# Patient Record
Sex: FEMALE | Race: WHITE | Employment: UNEMPLOYED | URBAN - METROPOLITAN AREA
[De-identification: names, ages, dates, MRNs, and addresses within clinical notes are randomized per-mention and may not be internally consistent; named-entity substitution may affect disease eponyms.]

---

## 2018-01-01 ENCOUNTER — HOSPITAL ENCOUNTER (OUTPATIENT)
Dept: MAMMOGRAPHY | Age: 58
Discharge: HOME | End: 2018-09-26
Attending: INTERNAL MEDICINE
Payer: MEDICARE

## 2018-01-01 DIAGNOSIS — Z12.31 VISIT FOR SCREENING MAMMOGRAM: ICD-10-CM

## 2018-01-01 PROCEDURE — 77067 SCR MAMMO BI INCL CAD: CPT

## 2018-09-17 PROBLEM — F32.A MILD DEPRESSION: Status: ACTIVE | Noted: 2018-01-01

## 2019-01-01 ENCOUNTER — APPOINTMENT (OUTPATIENT)
Dept: CT IMAGING | Age: 59
DRG: 020 | End: 2019-01-01
Attending: NURSE PRACTITIONER
Payer: MEDICARE

## 2019-01-01 ENCOUNTER — APPOINTMENT (OUTPATIENT)
Dept: GENERAL RADIOLOGY | Age: 59
DRG: 020 | End: 2019-01-01
Attending: NURSE PRACTITIONER
Payer: MEDICARE

## 2019-01-01 ENCOUNTER — APPOINTMENT (OUTPATIENT)
Dept: GENERAL RADIOLOGY | Age: 59
DRG: 020 | End: 2019-01-01
Attending: EMERGENCY MEDICINE
Payer: MEDICARE

## 2019-01-01 ENCOUNTER — APPOINTMENT (OUTPATIENT)
Dept: MRI IMAGING | Age: 59
DRG: 020 | End: 2019-01-01
Attending: NURSE PRACTITIONER
Payer: MEDICARE

## 2019-01-01 ENCOUNTER — APPOINTMENT (OUTPATIENT)
Dept: GENERAL RADIOLOGY | Age: 59
DRG: 020 | End: 2019-01-01
Attending: NEUROLOGICAL SURGERY
Payer: MEDICARE

## 2019-01-01 ENCOUNTER — APPOINTMENT (OUTPATIENT)
Dept: INTERVENTIONAL RADIOLOGY/VASCULAR | Age: 59
DRG: 020 | End: 2019-01-01
Attending: NURSE PRACTITIONER
Payer: MEDICARE

## 2019-01-01 ENCOUNTER — APPOINTMENT (OUTPATIENT)
Dept: CT IMAGING | Age: 59
DRG: 020 | End: 2019-01-01
Attending: NEUROLOGICAL SURGERY
Payer: MEDICARE

## 2019-01-01 ENCOUNTER — HOSPITAL ENCOUNTER (EMERGENCY)
Age: 59
Discharge: OTHER INSTITUTION | DRG: 020 | End: 2019-02-20
Attending: EMERGENCY MEDICINE
Payer: MEDICARE

## 2019-01-01 ENCOUNTER — HOSPITAL ENCOUNTER (INPATIENT)
Age: 59
LOS: 11 days | DRG: 020 | End: 2019-03-03
Attending: NEUROLOGICAL SURGERY | Admitting: NEUROLOGICAL SURGERY
Payer: MEDICARE

## 2019-01-01 ENCOUNTER — APPOINTMENT (OUTPATIENT)
Dept: CT IMAGING | Age: 59
DRG: 020 | End: 2019-01-01
Attending: EMERGENCY MEDICINE
Payer: MEDICARE

## 2019-01-01 VITALS
TEMPERATURE: 99.7 F | OXYGEN SATURATION: 100 % | WEIGHT: 117.5 LBS | DIASTOLIC BLOOD PRESSURE: 77 MMHG | BODY MASS INDEX: 18.44 KG/M2 | HEIGHT: 67 IN | HEART RATE: 90 BPM | SYSTOLIC BLOOD PRESSURE: 116 MMHG | RESPIRATION RATE: 12 BRPM

## 2019-01-01 VITALS
TEMPERATURE: 99.2 F | DIASTOLIC BLOOD PRESSURE: 66 MMHG | SYSTOLIC BLOOD PRESSURE: 178 MMHG | BODY MASS INDEX: 27.1 KG/M2 | WEIGHT: 173 LBS | OXYGEN SATURATION: 95 % | HEART RATE: 75 BPM | RESPIRATION RATE: 36 BRPM

## 2019-01-01 DIAGNOSIS — T79.6XXA TRAUMATIC RHABDOMYOLYSIS, INITIAL ENCOUNTER (HCC): ICD-10-CM

## 2019-01-01 DIAGNOSIS — B20 HIV (HUMAN IMMUNODEFICIENCY VIRUS INFECTION) (HCC): ICD-10-CM

## 2019-01-01 DIAGNOSIS — N30.00 ACUTE CYSTITIS WITHOUT HEMATURIA: ICD-10-CM

## 2019-01-01 DIAGNOSIS — I10 HYPERTENSION, UNSPECIFIED TYPE: ICD-10-CM

## 2019-01-01 DIAGNOSIS — R90.89 MIDLINE SHIFT OF BRAIN: ICD-10-CM

## 2019-01-01 DIAGNOSIS — R56.1 SEIZURE AFTER HEAD INJURY (HCC): ICD-10-CM

## 2019-01-01 DIAGNOSIS — I10 ESSENTIAL HYPERTENSION: ICD-10-CM

## 2019-01-01 DIAGNOSIS — I60.11: Chronic | ICD-10-CM

## 2019-01-01 DIAGNOSIS — I60.9 SUBARACHNOID HEMORRHAGE (HCC): Primary | ICD-10-CM

## 2019-01-01 DIAGNOSIS — Z51.5 END OF LIFE CARE: ICD-10-CM

## 2019-01-01 DIAGNOSIS — I61.0 NONTRAUMATIC SUBCORTICAL HEMORRHAGE OF RIGHT CEREBRAL HEMISPHERE (HCC): ICD-10-CM

## 2019-01-01 DIAGNOSIS — G91.0 COMMUNICATING HYDROCEPHALUS (HCC): ICD-10-CM

## 2019-01-01 DIAGNOSIS — G93.6 CEREBRAL EDEMA (HCC): ICD-10-CM

## 2019-01-01 DIAGNOSIS — J18.9 PNEUMONIA OF LEFT UPPER LOBE DUE TO INFECTIOUS ORGANISM: ICD-10-CM

## 2019-01-01 DIAGNOSIS — R40.2432 GLASGOW COMA SCALE SCORE 3-8, AT ARRIVAL TO EMERGENCY DEPARTMENT (HCC): ICD-10-CM

## 2019-01-01 DIAGNOSIS — I60.9 SAH (SUBARACHNOID HEMORRHAGE) (HCC): Primary | Chronic | ICD-10-CM

## 2019-01-01 DIAGNOSIS — R77.8 ELEVATED TROPONIN: ICD-10-CM

## 2019-01-01 DIAGNOSIS — J18.9 COMMUNITY ACQUIRED PNEUMONIA, UNSPECIFIED LATERALITY: ICD-10-CM

## 2019-01-01 DIAGNOSIS — R56.9 SEIZURE (HCC): ICD-10-CM

## 2019-01-01 DIAGNOSIS — I61.9 INTRAPARENCHYMAL HEMORRHAGE OF BRAIN (HCC): ICD-10-CM

## 2019-01-01 LAB
ABO + RH BLD: NORMAL
ALBUMIN SERPL-MCNC: 2.7 G/DL (ref 3.5–5)
ALBUMIN SERPL-MCNC: 2.7 G/DL (ref 3.5–5)
ALBUMIN SERPL-MCNC: 3.6 G/DL (ref 3.5–5)
ALBUMIN/GLOB SERPL: 0.9 {RATIO}
ALBUMIN/GLOB SERPL: 0.9 {RATIO} (ref 1.2–3.5)
ALBUMIN/GLOB SERPL: 1 {RATIO} (ref 1.2–3.5)
ALP SERPL-CCNC: 109 U/L (ref 50–130)
ALP SERPL-CCNC: 79 U/L (ref 50–136)
ALP SERPL-CCNC: 92 U/L (ref 50–136)
ALT SERPL-CCNC: 54 U/L (ref 12–65)
ALT SERPL-CCNC: 56 U/L (ref 12–65)
ALT SERPL-CCNC: 77 U/L (ref 12–65)
AMPHET UR QL SCN: NEGATIVE
ANION GAP SERPL CALC-SCNC: 11 MMOL/L (ref 7–16)
ANION GAP SERPL CALC-SCNC: 11 MMOL/L (ref 7–16)
ANION GAP SERPL CALC-SCNC: 12 MMOL/L (ref 7–16)
ANION GAP SERPL CALC-SCNC: 13 MMOL/L
ANION GAP SERPL CALC-SCNC: 14 MMOL/L (ref 7–16)
ANION GAP SERPL CALC-SCNC: 7 MMOL/L (ref 7–16)
ANION GAP SERPL CALC-SCNC: 8 MMOL/L (ref 7–16)
ANION GAP SERPL CALC-SCNC: 9 MMOL/L (ref 7–16)
ANION GAP SERPL CALC-SCNC: 9 MMOL/L (ref 7–16)
APPEARANCE UR: ABNORMAL
ARTERIAL PATENCY WRIST A: ABNORMAL
ARTERIAL PATENCY WRIST A: YES
ASPIRIN TEST, ASPIRN: 350 ARU (ref 620–672)
AST SERPL-CCNC: 105 U/L (ref 15–37)
AST SERPL-CCNC: 131 U/L (ref 15–37)
AST SERPL-CCNC: 67 U/L (ref 15–37)
ATRIAL RATE: 159 BPM
ATRIAL RATE: 77 BPM
BACTERIA SPEC CULT: ABNORMAL
BACTERIA SPEC CULT: NORMAL
BACTERIA URNS QL MICRO: ABNORMAL /HPF
BARBITURATES UR QL SCN: NEGATIVE
BASE DEFICIT BLD-SCNC: 11 MMOL/L
BASE DEFICIT BLD-SCNC: 13 MMOL/L
BASE DEFICIT BLD-SCNC: 3 MMOL/L
BASE DEFICIT BLD-SCNC: 3 MMOL/L
BASE DEFICIT BLD-SCNC: 5 MMOL/L
BASE DEFICIT BLD-SCNC: 6 MMOL/L
BASE DEFICIT BLD-SCNC: 7 MMOL/L
BASE DEFICIT BLD-SCNC: 7 MMOL/L
BASE DEFICIT BLD-SCNC: 9 MMOL/L
BASE DEFICIT BLD-SCNC: 9 MMOL/L
BASOPHILS # BLD AUTO: 0 X10E3/UL (ref 0–0.2)
BASOPHILS # BLD: 0 K/UL (ref 0–0.2)
BASOPHILS NFR BLD AUTO: 0 %
BASOPHILS NFR BLD: 0 % (ref 0–2)
BDY SITE: ABNORMAL
BENZODIAZ UR QL: POSITIVE
BILIRUB DIRECT SERPL-MCNC: 0.2 MG/DL
BILIRUB DIRECT SERPL-MCNC: <0.1 MG/DL
BILIRUB SERPL-MCNC: 0.3 MG/DL (ref 0.2–1.1)
BILIRUB SERPL-MCNC: 0.4 MG/DL (ref 0.2–1.1)
BILIRUB SERPL-MCNC: 0.5 MG/DL (ref 0.2–1.1)
BILIRUB UR QL: NEGATIVE
BLD PROD TYP BPU: NORMAL
BLOOD GROUP ANTIBODIES SERPL: NORMAL
BODY TEMPERATURE: 98.6
BPU ID: NORMAL
BUN SERPL-MCNC: 36 MG/DL (ref 6–23)
BUN SERPL-MCNC: 36 MG/DL (ref 6–23)
BUN SERPL-MCNC: 38 MG/DL (ref 6–23)
BUN SERPL-MCNC: 38 MG/DL (ref 6–23)
BUN SERPL-MCNC: 41 MG/DL (ref 6–23)
BUN SERPL-MCNC: 42 MG/DL (ref 6–23)
BUN SERPL-MCNC: 45 MG/DL (ref 6–23)
BUN SERPL-MCNC: 47 MG/DL (ref 6–23)
BUN SERPL-MCNC: 50 MG/DL (ref 6–23)
BUN SERPL-MCNC: 53 MG/DL (ref 6–23)
BUN SERPL-MCNC: 55 MG/DL (ref 6–23)
CALCIUM SERPL-MCNC: 6.8 MG/DL (ref 8.3–10.4)
CALCIUM SERPL-MCNC: 6.9 MG/DL (ref 8.3–10.4)
CALCIUM SERPL-MCNC: 7 MG/DL (ref 8.3–10.4)
CALCIUM SERPL-MCNC: 7.4 MG/DL (ref 8.3–10.4)
CALCIUM SERPL-MCNC: 7.6 MG/DL (ref 8.3–10.4)
CALCIUM SERPL-MCNC: 7.7 MG/DL (ref 8.3–10.4)
CALCIUM SERPL-MCNC: 7.9 MG/DL (ref 8.3–10.4)
CALCIUM SERPL-MCNC: 7.9 MG/DL (ref 8.3–10.4)
CALCIUM SERPL-MCNC: 8.1 MG/DL (ref 8.3–10.4)
CALCIUM SERPL-MCNC: 8.1 MG/DL (ref 8.3–10.4)
CALCIUM SERPL-MCNC: 9.2 MG/DL (ref 8.3–10.4)
CALCULATED P AXIS, ECG09: 64 DEGREES
CALCULATED R AXIS, ECG10: 82 DEGREES
CALCULATED R AXIS, ECG10: 87 DEGREES
CALCULATED T AXIS, ECG11: -104 DEGREES
CALCULATED T AXIS, ECG11: -129 DEGREES
CANNABINOIDS UR QL SCN: NEGATIVE
CD3 CELLS # BLD: 892 /UL (ref 622–2402)
CD3 CELLS NFR BLD: 81.1 % (ref 57.5–86.2)
CD3+CD4+ CELLS # BLD: 350 /UL (ref 359–1519)
CD3+CD4+ CELLS # BLD: 407 /UL (ref 359–1519)
CD3+CD4+ CELLS NFR BLD: 31.8 % (ref 30.8–58.5)
CD3+CD4+ CELLS NFR BLD: 33.9 % (ref 30.8–58.5)
CD3+CD4+ CELLS/CD3+CD8+ CLL BLD: 0.64 % (ref 0.92–3.72)
CD3+CD8+ CELLS # BLD: 546 /UL (ref 109–897)
CD3+CD8+ CELLS NFR BLD: 49.6 % (ref 12–35.5)
CHLORIDE SERPL-SCNC: 109 MMOL/L (ref 98–107)
CHLORIDE SERPL-SCNC: 118 MMOL/L (ref 98–107)
CHLORIDE SERPL-SCNC: 120 MMOL/L (ref 98–107)
CHLORIDE SERPL-SCNC: 123 MMOL/L (ref 98–107)
CHLORIDE SERPL-SCNC: 125 MMOL/L (ref 98–107)
CHLORIDE SERPL-SCNC: 127 MMOL/L (ref 98–107)
CHLORIDE SERPL-SCNC: 129 MMOL/L (ref 98–107)
CHOLEST SERPL-MCNC: 83 MG/DL
CK SERPL-CCNC: 1753 U/L (ref 21–215)
CK SERPL-CCNC: 2191 U/L (ref 21–215)
CK SERPL-CCNC: 881 U/L (ref 21–215)
CO2 BLD-SCNC: 13 MMOL/L
CO2 BLD-SCNC: 16 MMOL/L
CO2 BLD-SCNC: 17 MMOL/L
CO2 BLD-SCNC: 17 MMOL/L
CO2 BLD-SCNC: 18 MMOL/L
CO2 BLD-SCNC: 18 MMOL/L
CO2 BLD-SCNC: 19 MMOL/L
CO2 BLD-SCNC: 19 MMOL/L
CO2 BLD-SCNC: 20 MMOL/L
CO2 BLD-SCNC: 20 MMOL/L
CO2 BLD-SCNC: 21 MMOL/L
CO2 BLD-SCNC: 21 MMOL/L
CO2 BLD-SCNC: 22 MMOL/L
CO2 SERPL-SCNC: 14 MMOL/L (ref 21–32)
CO2 SERPL-SCNC: 17 MMOL/L (ref 21–32)
CO2 SERPL-SCNC: 18 MMOL/L (ref 21–32)
CO2 SERPL-SCNC: 18 MMOL/L (ref 21–32)
CO2 SERPL-SCNC: 19 MMOL/L (ref 21–32)
CO2 SERPL-SCNC: 20 MMOL/L (ref 21–32)
CO2 SERPL-SCNC: 21 MMOL/L (ref 21–32)
CO2 SERPL-SCNC: 22 MMOL/L (ref 21–32)
COCAINE UR QL SCN: NEGATIVE
COLLECT TIME,HTIME: 1416
COLLECT TIME,HTIME: 1533
COLLECT TIME,HTIME: 2245
COLLECT TIME,HTIME: 244
COLLECT TIME,HTIME: 310
COLLECT TIME,HTIME: 311
COLLECT TIME,HTIME: 315
COLLECT TIME,HTIME: 323
COLLECT TIME,HTIME: 325
COLLECT TIME,HTIME: 327
COLLECT TIME,HTIME: 327
COLLECT TIME,HTIME: 335
COLLECT TIME,HTIME: 344
COLOR UR: YELLOW
CREAT SERPL-MCNC: 1.94 MG/DL (ref 0.6–1)
CREAT SERPL-MCNC: 2.12 MG/DL (ref 0.6–1)
CREAT SERPL-MCNC: 2.13 MG/DL (ref 0.6–1)
CREAT SERPL-MCNC: 2.2 MG/DL (ref 0.6–1)
CREAT SERPL-MCNC: 2.2 MG/DL (ref 0.6–1)
CREAT SERPL-MCNC: 2.21 MG/DL (ref 0.6–1)
CREAT SERPL-MCNC: 2.25 MG/DL (ref 0.6–1)
CREAT SERPL-MCNC: 2.26 MG/DL (ref 0.6–1)
CREAT SERPL-MCNC: 2.29 MG/DL (ref 0.6–1)
CREAT SERPL-MCNC: 2.32 MG/DL (ref 0.6–1)
CREAT SERPL-MCNC: 3.2 MG/DL (ref 0.6–1)
CROSSMATCH RESULT,%XM: NORMAL
DIAGNOSIS, 93000: NORMAL
DIAGNOSIS, 93000: NORMAL
DIFFERENTIAL METHOD BLD: ABNORMAL
EOSINOPHIL # BLD AUTO: 0 X10E3/UL (ref 0–0.4)
EOSINOPHIL # BLD: 0 K/UL (ref 0–0.8)
EOSINOPHIL NFR BLD AUTO: 0 %
EOSINOPHIL NFR BLD: 0 % (ref 0.5–7.8)
EPI CELLS #/AREA URNS HPF: ABNORMAL /HPF
ERYTHROCYTE [DISTWIDTH] IN BLOOD BY AUTOMATED COUNT: 14 % (ref 11.9–14.6)
ERYTHROCYTE [DISTWIDTH] IN BLOOD BY AUTOMATED COUNT: 14.3 % (ref 11.9–14.6)
ERYTHROCYTE [DISTWIDTH] IN BLOOD BY AUTOMATED COUNT: 14.6 % (ref 11.9–14.6)
ERYTHROCYTE [DISTWIDTH] IN BLOOD BY AUTOMATED COUNT: 15.3 % (ref 11.9–14.6)
ERYTHROCYTE [DISTWIDTH] IN BLOOD BY AUTOMATED COUNT: 15.4 % (ref 11.9–14.6)
ERYTHROCYTE [DISTWIDTH] IN BLOOD BY AUTOMATED COUNT: 15.7 % (ref 11.9–14.6)
ERYTHROCYTE [DISTWIDTH] IN BLOOD BY AUTOMATED COUNT: 15.7 % (ref 11.9–14.6)
ERYTHROCYTE [DISTWIDTH] IN BLOOD BY AUTOMATED COUNT: 15.8 % (ref 12.3–15.4)
ERYTHROCYTE [DISTWIDTH] IN BLOOD BY AUTOMATED COUNT: 15.9 % (ref 11.9–14.6)
ERYTHROCYTE [DISTWIDTH] IN BLOOD BY AUTOMATED COUNT: 15.9 % (ref 11.9–14.6)
ERYTHROCYTE [DISTWIDTH] IN BLOOD BY AUTOMATED COUNT: 16 % (ref 11.9–14.6)
ERYTHROCYTE [DISTWIDTH] IN BLOOD BY AUTOMATED COUNT: 16.3 % (ref 11.9–14.6)
EST. AVERAGE GLUCOSE BLD GHB EST-MCNC: ABNORMAL MG/DL
EXHALED MINUTE VOLUME, VE: 10.2 L/MIN
EXHALED MINUTE VOLUME, VE: 10.5 L/MIN
EXHALED MINUTE VOLUME, VE: 10.6 L/MIN
EXHALED MINUTE VOLUME, VE: 10.8 L/MIN
EXHALED MINUTE VOLUME, VE: 11.3 L/MIN
EXHALED MINUTE VOLUME, VE: 11.4 L/MIN
EXHALED MINUTE VOLUME, VE: 12 L/MIN
EXHALED MINUTE VOLUME, VE: 13.1 L/MIN
EXHALED MINUTE VOLUME, VE: 13.8 L/MIN
EXHALED MINUTE VOLUME, VE: 7.2 L/MIN
EXHALED MINUTE VOLUME, VE: 7.2 L/MIN
EXHALED MINUTE VOLUME, VE: 8.1 L/MIN
EXHALED MINUTE VOLUME, VE: 8.3 L/MIN
GAS FLOW.O2 O2 DELIVERY SYS: ABNORMAL L/MIN
GAS FLOW.O2 SETTING OXYMISER: 12 BPM
GAS FLOW.O2 SETTING OXYMISER: 15 BPM
GLOBULIN SER CALC-MCNC: 2.6 G/DL (ref 2.3–3.5)
GLOBULIN SER CALC-MCNC: 3 G/DL (ref 2.3–3.5)
GLOBULIN SER CALC-MCNC: 3.9 G/DL (ref 2.3–3.5)
GLUCOSE BLD STRIP.AUTO-MCNC: 107 MG/DL (ref 65–100)
GLUCOSE BLD STRIP.AUTO-MCNC: 108 MG/DL (ref 65–100)
GLUCOSE BLD STRIP.AUTO-MCNC: 118 MG/DL (ref 65–100)
GLUCOSE BLD STRIP.AUTO-MCNC: 125 MG/DL (ref 65–100)
GLUCOSE BLD STRIP.AUTO-MCNC: 128 MG/DL (ref 65–100)
GLUCOSE BLD STRIP.AUTO-MCNC: 130 MG/DL (ref 65–100)
GLUCOSE BLD STRIP.AUTO-MCNC: 131 MG/DL (ref 65–100)
GLUCOSE BLD STRIP.AUTO-MCNC: 141 MG/DL (ref 65–100)
GLUCOSE BLD STRIP.AUTO-MCNC: 153 MG/DL (ref 65–100)
GLUCOSE BLD STRIP.AUTO-MCNC: 156 MG/DL (ref 65–100)
GLUCOSE BLD STRIP.AUTO-MCNC: 158 MG/DL (ref 65–100)
GLUCOSE BLD STRIP.AUTO-MCNC: 159 MG/DL (ref 65–100)
GLUCOSE BLD STRIP.AUTO-MCNC: 160 MG/DL (ref 65–100)
GLUCOSE BLD STRIP.AUTO-MCNC: 161 MG/DL (ref 65–100)
GLUCOSE BLD STRIP.AUTO-MCNC: 162 MG/DL (ref 65–100)
GLUCOSE BLD STRIP.AUTO-MCNC: 162 MG/DL (ref 65–100)
GLUCOSE BLD STRIP.AUTO-MCNC: 164 MG/DL (ref 65–100)
GLUCOSE BLD STRIP.AUTO-MCNC: 164 MG/DL (ref 65–100)
GLUCOSE BLD STRIP.AUTO-MCNC: 165 MG/DL (ref 65–100)
GLUCOSE BLD STRIP.AUTO-MCNC: 167 MG/DL (ref 65–100)
GLUCOSE BLD STRIP.AUTO-MCNC: 168 MG/DL (ref 65–100)
GLUCOSE BLD STRIP.AUTO-MCNC: 169 MG/DL (ref 65–100)
GLUCOSE BLD STRIP.AUTO-MCNC: 173 MG/DL (ref 65–100)
GLUCOSE BLD STRIP.AUTO-MCNC: 178 MG/DL (ref 65–100)
GLUCOSE BLD STRIP.AUTO-MCNC: 180 MG/DL (ref 65–100)
GLUCOSE BLD STRIP.AUTO-MCNC: 181 MG/DL (ref 65–100)
GLUCOSE BLD STRIP.AUTO-MCNC: 182 MG/DL (ref 65–100)
GLUCOSE BLD STRIP.AUTO-MCNC: 182 MG/DL (ref 65–100)
GLUCOSE BLD STRIP.AUTO-MCNC: 183 MG/DL (ref 65–100)
GLUCOSE BLD STRIP.AUTO-MCNC: 186 MG/DL (ref 65–100)
GLUCOSE BLD STRIP.AUTO-MCNC: 187 MG/DL (ref 65–100)
GLUCOSE BLD STRIP.AUTO-MCNC: 188 MG/DL (ref 65–100)
GLUCOSE BLD STRIP.AUTO-MCNC: 190 MG/DL (ref 65–100)
GLUCOSE BLD STRIP.AUTO-MCNC: 193 MG/DL (ref 65–100)
GLUCOSE BLD STRIP.AUTO-MCNC: 196 MG/DL (ref 65–100)
GLUCOSE BLD STRIP.AUTO-MCNC: 201 MG/DL (ref 65–100)
GLUCOSE BLD STRIP.AUTO-MCNC: 205 MG/DL (ref 65–100)
GLUCOSE BLD STRIP.AUTO-MCNC: 227 MG/DL (ref 65–100)
GLUCOSE SERPL-MCNC: 123 MG/DL (ref 65–100)
GLUCOSE SERPL-MCNC: 128 MG/DL (ref 65–100)
GLUCOSE SERPL-MCNC: 148 MG/DL (ref 65–100)
GLUCOSE SERPL-MCNC: 158 MG/DL (ref 65–100)
GLUCOSE SERPL-MCNC: 168 MG/DL (ref 65–100)
GLUCOSE SERPL-MCNC: 170 MG/DL (ref 65–100)
GLUCOSE SERPL-MCNC: 170 MG/DL (ref 65–100)
GLUCOSE SERPL-MCNC: 178 MG/DL (ref 65–100)
GLUCOSE SERPL-MCNC: 183 MG/DL (ref 65–100)
GLUCOSE SERPL-MCNC: 192 MG/DL (ref 65–100)
GLUCOSE SERPL-MCNC: 227 MG/DL (ref 65–100)
GLUCOSE UR STRIP.AUTO-MCNC: NEGATIVE MG/DL
GRAM STN SPEC: NORMAL
HBA1C MFR BLD: <3.5 % (ref 4.8–6)
HCO3 BLD-SCNC: 12.6 MMOL/L (ref 22–26)
HCO3 BLD-SCNC: 15.4 MMOL/L (ref 22–26)
HCO3 BLD-SCNC: 15.6 MMOL/L (ref 22–26)
HCO3 BLD-SCNC: 16.2 MMOL/L (ref 22–26)
HCO3 BLD-SCNC: 16.7 MMOL/L (ref 22–26)
HCO3 BLD-SCNC: 17.5 MMOL/L (ref 22–26)
HCO3 BLD-SCNC: 17.8 MMOL/L (ref 22–26)
HCO3 BLD-SCNC: 18.1 MMOL/L (ref 22–26)
HCO3 BLD-SCNC: 18.5 MMOL/L (ref 22–26)
HCO3 BLD-SCNC: 19.2 MMOL/L (ref 22–26)
HCO3 BLD-SCNC: 19.5 MMOL/L (ref 22–26)
HCO3 BLD-SCNC: 19.7 MMOL/L (ref 22–26)
HCO3 BLD-SCNC: 20.8 MMOL/L (ref 22–26)
HCT VFR BLD AUTO: 23.4 % (ref 35.8–46.3)
HCT VFR BLD AUTO: 23.6 % (ref 35.8–46.3)
HCT VFR BLD AUTO: 23.7 % (ref 35.8–46.3)
HCT VFR BLD AUTO: 24.6 % (ref 34–46.6)
HCT VFR BLD AUTO: 26.7 % (ref 35.8–46.3)
HCT VFR BLD AUTO: 27.1 % (ref 35.8–46.3)
HCT VFR BLD AUTO: 27.4 % (ref 35.8–46.3)
HCT VFR BLD AUTO: 27.6 % (ref 35.8–46.3)
HCT VFR BLD AUTO: 27.7 % (ref 35.8–46.3)
HCT VFR BLD AUTO: 27.8 % (ref 35.8–46.3)
HCT VFR BLD AUTO: 28.8 % (ref 35.8–46.3)
HCT VFR BLD AUTO: 28.8 % (ref 35.8–46.3)
HCT VFR BLD AUTO: 29.4 % (ref 35.8–46.3)
HCT VFR BLD AUTO: 33.5 % (ref 35.8–46.3)
HCV AB S/CO SERPL IA: 6 S/CO RATIO (ref 0–0.9)
HCV RNA SERPL QL NAA+PROBE: NEGATIVE
HCV RNA SERPL QL NAA+PROBE: NORMAL
HDLC SERPL-MCNC: 25 MG/DL (ref 40–60)
HDLC SERPL: 3.3 {RATIO}
HEPARIN INDUCED PLT,XHIPA: NEGATIVE
HGB BLD-MCNC: 10.9 G/DL (ref 11.7–15.4)
HGB BLD-MCNC: 7.3 G/DL (ref 11.7–15.4)
HGB BLD-MCNC: 7.4 G/DL (ref 11.7–15.4)
HGB BLD-MCNC: 7.6 G/DL (ref 11.7–15.4)
HGB BLD-MCNC: 8.3 G/DL (ref 11.1–15.9)
HGB BLD-MCNC: 8.5 G/DL (ref 11.7–15.4)
HGB BLD-MCNC: 8.7 G/DL (ref 11.7–15.4)
HGB BLD-MCNC: 8.8 G/DL (ref 11.7–15.4)
HGB BLD-MCNC: 9 G/DL (ref 11.7–15.4)
HGB BLD-MCNC: 9.1 G/DL (ref 11.7–15.4)
HGB BLD-MCNC: 9.2 G/DL (ref 11.7–15.4)
HGB BLD-MCNC: 9.3 G/DL (ref 11.7–15.4)
HGB UR QL STRIP: ABNORMAL
HIT INTERPRETATION,XINTPR: NEGATIVE
HIT PROFILE,XHITT: NORMAL
IMM GRANULOCYTES # BLD AUTO: 0.1 K/UL (ref 0–0.5)
IMM GRANULOCYTES # BLD: 0.1 X10E3/UL (ref 0–0.1)
IMM GRANULOCYTES NFR BLD AUTO: 0 % (ref 0–5)
IMM GRANULOCYTES NFR BLD: 1 %
INR PPP: 1.1
INSPIRATION.DURATION SETTING TIME VENT: 0.9 SEC
KETONES UR QL STRIP.AUTO: NEGATIVE MG/DL
LACTATE BLD-SCNC: 2.63 MMOL/L (ref 0.5–1.9)
LACTATE SERPL-SCNC: 1.1 MMOL/L (ref 0.4–2)
LDLC SERPL CALC-MCNC: 37.2 MG/DL
LEUKOCYTE ESTERASE UR QL STRIP.AUTO: ABNORMAL
LIPID PROFILE,FLP: ABNORMAL
LYMPHOCYTES # BLD AUTO: 1.1 X10E3/UL (ref 0.7–3.1)
LYMPHOCYTES # BLD AUTO: 1.2 X10E3/UL (ref 0.7–3.1)
LYMPHOCYTES # BLD: 1.5 K/UL (ref 0.5–4.6)
LYMPHOCYTES NFR BLD AUTO: 11 %
LYMPHOCYTES NFR BLD AUTO: 11 %
LYMPHOCYTES NFR BLD: 9 % (ref 13–44)
MAGNESIUM SERPL-MCNC: 1.1 MG/DL (ref 1.8–2.4)
MAGNESIUM SERPL-MCNC: 2.2 MG/DL (ref 1.8–2.4)
MAGNESIUM SERPL-MCNC: 2.2 MG/DL (ref 1.8–2.4)
MAGNESIUM SERPL-MCNC: 2.3 MG/DL (ref 1.8–2.4)
MAGNESIUM SERPL-MCNC: 2.3 MG/DL (ref 1.8–2.4)
MAGNESIUM SERPL-MCNC: 2.4 MG/DL (ref 1.8–2.4)
MAGNESIUM SERPL-MCNC: 2.4 MG/DL (ref 1.8–2.4)
MAGNESIUM SERPL-MCNC: 2.5 MG/DL (ref 1.8–2.4)
MAGNESIUM SERPL-MCNC: 2.5 MG/DL (ref 1.8–2.4)
MAGNESIUM SERPL-MCNC: 2.7 MG/DL (ref 1.8–2.4)
MAGNESIUM SERPL-MCNC: 4 MG/DL (ref 1.8–2.4)
MCH RBC QN AUTO: 30.9 PG (ref 26.1–32.9)
MCH RBC QN AUTO: 31.1 PG (ref 26.1–32.9)
MCH RBC QN AUTO: 31.2 PG (ref 26.1–32.9)
MCH RBC QN AUTO: 31.3 PG (ref 26.1–32.9)
MCH RBC QN AUTO: 31.4 PG (ref 26.1–32.9)
MCH RBC QN AUTO: 31.5 PG (ref 26.1–32.9)
MCH RBC QN AUTO: 31.7 PG (ref 26.1–32.9)
MCH RBC QN AUTO: 31.7 PG (ref 26.1–32.9)
MCH RBC QN AUTO: 31.8 PG (ref 26.6–33)
MCH RBC QN AUTO: 32.3 PG (ref 26.1–32.9)
MCHC RBC AUTO-ENTMCNC: 30.9 G/DL (ref 31.4–35)
MCHC RBC AUTO-ENTMCNC: 31.3 G/DL (ref 31.4–35)
MCHC RBC AUTO-ENTMCNC: 31.6 G/DL (ref 31.4–35)
MCHC RBC AUTO-ENTMCNC: 31.9 G/DL (ref 31.4–35)
MCHC RBC AUTO-ENTMCNC: 32.1 G/DL (ref 31.4–35)
MCHC RBC AUTO-ENTMCNC: 32.3 G/DL (ref 31.4–35)
MCHC RBC AUTO-ENTMCNC: 32.5 G/DL (ref 31.4–35)
MCHC RBC AUTO-ENTMCNC: 32.5 G/DL (ref 31.4–35)
MCHC RBC AUTO-ENTMCNC: 32.7 G/DL (ref 31.4–35)
MCHC RBC AUTO-ENTMCNC: 32.8 G/DL (ref 31.4–35)
MCHC RBC AUTO-ENTMCNC: 32.9 G/DL (ref 31.4–35)
MCHC RBC AUTO-ENTMCNC: 33.7 G/DL (ref 31.5–35.7)
MCV RBC AUTO: 100.9 FL (ref 79.6–97.8)
MCV RBC AUTO: 94 FL (ref 79–97)
MCV RBC AUTO: 95.4 FL (ref 79.6–97.8)
MCV RBC AUTO: 96.2 FL (ref 79.6–97.8)
MCV RBC AUTO: 96.5 FL (ref 79.6–97.8)
MCV RBC AUTO: 96.5 FL (ref 79.6–97.8)
MCV RBC AUTO: 97.5 FL (ref 79.6–97.8)
MCV RBC AUTO: 97.6 FL (ref 79.6–97.8)
MCV RBC AUTO: 98.7 FL (ref 79.6–97.8)
MCV RBC AUTO: 98.9 FL (ref 79.6–97.8)
MCV RBC AUTO: 99.3 FL (ref 79.6–97.8)
MCV RBC AUTO: 99.6 FL (ref 79.6–97.8)
METHADONE UR QL: NEGATIVE
MONOCYTES # BLD AUTO: 0.6 X10E3/UL (ref 0.1–0.9)
MONOCYTES # BLD: 0.7 K/UL (ref 0.1–1.3)
MONOCYTES NFR BLD AUTO: 5 %
MONOCYTES NFR BLD: 4 % (ref 4–12)
NEUTROPHILS # BLD AUTO: 9.4 X10E3/UL (ref 1.4–7)
NEUTROPHILS NFR BLD AUTO: 83 %
NEUTS SEG # BLD: 14.3 K/UL (ref 1.7–8.2)
NEUTS SEG NFR BLD: 87 % (ref 43–78)
NITRITE UR QL STRIP.AUTO: NEGATIVE
NRBC # BLD: 0 K/UL (ref 0–0.2)
O2/TOTAL GAS SETTING VFR VENT: 100 %
O2/TOTAL GAS SETTING VFR VENT: 100 %
O2/TOTAL GAS SETTING VFR VENT: 40 %
O2/TOTAL GAS SETTING VFR VENT: 50 %
OPIATES UR QL: POSITIVE
OPTICAL DENSITY READ,XHITAO: 0.1 ABS
P-R INTERVAL, ECG05: 142 MS
PCO2 BLD: 26.4 MMHG (ref 35–45)
PCO2 BLD: 26.8 MMHG (ref 35–45)
PCO2 BLD: 26.9 MMHG (ref 35–45)
PCO2 BLD: 27.2 MMHG (ref 35–45)
PCO2 BLD: 27.4 MMHG (ref 35–45)
PCO2 BLD: 27.6 MMHG (ref 35–45)
PCO2 BLD: 27.8 MMHG (ref 35–45)
PCO2 BLD: 28.6 MMHG (ref 35–45)
PCO2 BLD: 29 MMHG (ref 35–45)
PCO2 BLD: 30.1 MMHG (ref 35–45)
PCO2 BLD: 33.1 MMHG (ref 35–45)
PCO2 BLD: 37.8 MMHG (ref 35–45)
PCO2 BLD: 38.1 MMHG (ref 35–45)
PCP UR QL: NEGATIVE
PEEP RESPIRATORY: 8 CMH2O
PH BLD: 7.22 [PH] (ref 7.35–7.45)
PH BLD: 7.28 [PH] (ref 7.35–7.45)
PH BLD: 7.32 [PH] (ref 7.35–7.45)
PH BLD: 7.35 [PH] (ref 7.35–7.45)
PH BLD: 7.36 [PH] (ref 7.35–7.45)
PH BLD: 7.37 [PH] (ref 7.35–7.45)
PH BLD: 7.39 [PH] (ref 7.35–7.45)
PH BLD: 7.41 [PH] (ref 7.35–7.45)
PH BLD: 7.41 [PH] (ref 7.35–7.45)
PH BLD: 7.42 [PH] (ref 7.35–7.45)
PH BLD: 7.44 [PH] (ref 7.35–7.45)
PH BLD: 7.47 [PH] (ref 7.35–7.45)
PH BLD: 7.47 [PH] (ref 7.35–7.45)
PH UR STRIP: 5.5 [PH] (ref 5–9)
PLATELET # BLD AUTO: 114 K/UL (ref 150–450)
PLATELET # BLD AUTO: 122 K/UL (ref 150–450)
PLATELET # BLD AUTO: 135 K/UL (ref 150–450)
PLATELET # BLD AUTO: 135 X10E3/UL (ref 150–379)
PLATELET # BLD AUTO: 142 K/UL (ref 150–450)
PLATELET # BLD AUTO: 144 K/UL (ref 150–450)
PLATELET # BLD AUTO: 161 K/UL (ref 150–450)
PLATELET # BLD AUTO: 165 K/UL (ref 150–450)
PLATELET # BLD AUTO: 178 K/UL (ref 150–450)
PLATELET # BLD AUTO: 189 K/UL (ref 150–450)
PLATELET # BLD AUTO: 192 K/UL (ref 150–450)
PLATELET # BLD AUTO: 207 K/UL (ref 150–450)
PMV BLD AUTO: 10 FL (ref 9.4–12.3)
PMV BLD AUTO: 10.2 FL (ref 9.4–12.3)
PMV BLD AUTO: 10.2 FL (ref 9.4–12.3)
PMV BLD AUTO: 9.4 FL (ref 9.4–12.3)
PMV BLD AUTO: 9.6 FL (ref 9.4–12.3)
PMV BLD AUTO: 9.8 FL (ref 9.4–12.3)
PMV BLD AUTO: 9.8 FL (ref 9.4–12.3)
PMV BLD AUTO: 9.9 FL (ref 9.4–12.3)
PO2 BLD: 110 MMHG (ref 75–100)
PO2 BLD: 113 MMHG (ref 75–100)
PO2 BLD: 130 MMHG (ref 75–100)
PO2 BLD: 146 MMHG (ref 75–100)
PO2 BLD: 173 MMHG (ref 75–100)
PO2 BLD: 175 MMHG (ref 75–100)
PO2 BLD: 185 MMHG (ref 75–100)
PO2 BLD: 199 MMHG (ref 75–100)
PO2 BLD: 201 MMHG (ref 75–100)
PO2 BLD: 218 MMHG (ref 75–100)
PO2 BLD: 401 MMHG (ref 75–100)
PO2 BLD: 545 MMHG (ref 75–100)
PO2 BLD: 85 MMHG (ref 75–100)
POTASSIUM SERPL-SCNC: 3 MMOL/L (ref 3.5–5.1)
POTASSIUM SERPL-SCNC: 3.2 MMOL/L (ref 3.5–5.1)
POTASSIUM SERPL-SCNC: 3.3 MMOL/L (ref 3.5–5.1)
POTASSIUM SERPL-SCNC: 3.4 MMOL/L (ref 3.5–5.1)
POTASSIUM SERPL-SCNC: 3.5 MMOL/L (ref 3.5–5.1)
POTASSIUM SERPL-SCNC: 3.6 MMOL/L (ref 3.5–5.1)
POTASSIUM SERPL-SCNC: 3.8 MMOL/L (ref 3.5–5.1)
POTASSIUM SERPL-SCNC: 3.9 MMOL/L (ref 3.5–5.1)
POTASSIUM SERPL-SCNC: 3.9 MMOL/L (ref 3.5–5.1)
POTASSIUM SERPL-SCNC: 4 MMOL/L (ref 3.5–5.1)
PROCALCITONIN SERPL-MCNC: 0.7 NG/ML
PROT SERPL-MCNC: 5.3 G/DL (ref 6.3–8.2)
PROT SERPL-MCNC: 5.7 G/DL (ref 6.3–8.2)
PROT SERPL-MCNC: 7.5 G/DL
PROT UR STRIP-MCNC: 100 MG/DL
PROTHROMBIN TIME: 13.8 SEC (ref 11.7–14.5)
Q-T INTERVAL, ECG07: 472 MS
Q-T INTERVAL, ECG07: 532 MS
QRS DURATION, ECG06: 88 MS
QRS DURATION, ECG06: 94 MS
QTC CALCULATION (BEZET), ECG08: 531 MS
QTC CALCULATION (BEZET), ECG08: 602 MS
RBC # BLD AUTO: 2.34 M/UL (ref 4.05–5.2)
RBC # BLD AUTO: 2.35 M/UL (ref 4.05–5.2)
RBC # BLD AUTO: 2.61 X10E6/UL (ref 3.77–5.28)
RBC # BLD AUTO: 2.78 M/UL (ref 4.05–5.2)
RBC # BLD AUTO: 2.79 M/UL (ref 4.05–5.2)
RBC # BLD AUTO: 2.84 M/UL (ref 4.05–5.2)
RBC # BLD AUTO: 2.87 M/UL (ref 4.05–5.2)
RBC # BLD AUTO: 2.89 M/UL (ref 4.05–5.2)
RBC # BLD AUTO: 2.9 M/UL (ref 4.05–5.2)
RBC # BLD AUTO: 2.95 M/UL (ref 4.05–5.2)
RBC # BLD AUTO: 2.98 M/UL (ref 4.05–5.2)
RBC # BLD AUTO: 3.51 M/UL (ref 4.05–5.2)
RBC #/AREA URNS HPF: ABNORMAL /HPF
SAO2 % BLD: 100 % (ref 95–98)
SAO2 % BLD: 97 % (ref 95–98)
SAO2 % BLD: 98 % (ref 95–98)
SAO2 % BLD: 99 % (ref 95–98)
SERVICE CMNT-IMP: ABNORMAL
SERVICE CMNT-IMP: NORMAL
SODIUM SERPL-SCNC: 142 MMOL/L (ref 136–145)
SODIUM SERPL-SCNC: 146 MMOL/L (ref 136–145)
SODIUM SERPL-SCNC: 146 MMOL/L (ref 136–145)
SODIUM SERPL-SCNC: 147 MMOL/L (ref 136–145)
SODIUM SERPL-SCNC: 149 MMOL/L (ref 136–145)
SODIUM SERPL-SCNC: 149 MMOL/L (ref 136–145)
SODIUM SERPL-SCNC: 150 MMOL/L (ref 136–145)
SODIUM SERPL-SCNC: 151 MMOL/L (ref 136–145)
SODIUM SERPL-SCNC: 152 MMOL/L (ref 136–145)
SODIUM SERPL-SCNC: 152 MMOL/L (ref 136–145)
SODIUM SERPL-SCNC: 153 MMOL/L (ref 136–145)
SODIUM SERPL-SCNC: 155 MMOL/L (ref 136–145)
SODIUM SERPL-SCNC: 156 MMOL/L (ref 136–145)
SODIUM SERPL-SCNC: 157 MMOL/L (ref 136–145)
SODIUM SERPL-SCNC: 158 MMOL/L (ref 136–145)
SODIUM SERPL-SCNC: 158 MMOL/L (ref 136–145)
SP GR UR REFRACTOMETRY: 1.02 (ref 1–1.02)
SPECIMEN EXP DATE BLD: NORMAL
SPECIMEN TYPE: ABNORMAL
STATUS OF UNIT,%ST: NORMAL
TRIGL SERPL-MCNC: 104 MG/DL (ref 35–150)
TRIGL SERPL-MCNC: 343 MG/DL (ref 35–150)
TROPONIN I SERPL-MCNC: 1.1 NG/ML (ref 0.02–0.05)
TROPONIN I SERPL-MCNC: 1.34 NG/ML (ref 0.02–0.05)
TROPONIN I SERPL-MCNC: 1.93 NG/ML (ref 0.02–0.05)
TROPONIN I SERPL-MCNC: 2.41 NG/ML (ref 0.02–0.05)
TROPONIN I SERPL-MCNC: 3.72 NG/ML (ref 0.02–0.05)
TROPONIN I SERPL-MCNC: 4.81 NG/ML (ref 0.02–0.05)
TROPONIN I SERPL-MCNC: 6.49 NG/ML (ref 0.02–0.05)
UNIT DIVISION, %UDIV: 0
UROBILINOGEN UR QL STRIP.AUTO: 0.2 EU/DL (ref 0.2–1)
VANCOMYCIN SERPL-MCNC: 12.8 UG/ML
VANCOMYCIN SERPL-MCNC: 17.1 UG/ML
VANCOMYCIN TROUGH SERPL-MCNC: 18.6 UG/ML (ref 5–20)
VENTILATION MODE VENT: ABNORMAL
VENTRICULAR RATE, ECG03: 76 BPM
VENTRICULAR RATE, ECG03: 77 BPM
VLDLC SERPL CALC-MCNC: 20.8 MG/DL (ref 6–23)
VT SETTING VENT: 450 ML
WBC # BLD AUTO: 11.1 K/UL (ref 4.3–11.1)
WBC # BLD AUTO: 11.3 X10E3/UL (ref 3.4–10.8)
WBC # BLD AUTO: 11.7 K/UL (ref 4.3–11.1)
WBC # BLD AUTO: 11.8 K/UL (ref 4.3–11.1)
WBC # BLD AUTO: 12.6 K/UL (ref 4.3–11.1)
WBC # BLD AUTO: 13.4 K/UL (ref 4.3–11.1)
WBC # BLD AUTO: 13.5 K/UL (ref 4.3–11.1)
WBC # BLD AUTO: 16.5 K/UL (ref 4.3–11.1)
WBC # BLD AUTO: 17.3 K/UL (ref 4.3–11.1)
WBC # BLD AUTO: 20.7 K/UL (ref 4.3–11.1)
WBC # BLD AUTO: 9.4 X10E3/UL (ref 3.4–10.8)
WBC URNS QL MICRO: ABNORMAL /HPF

## 2019-01-01 PROCEDURE — 36430 TRANSFUSION BLD/BLD COMPNT: CPT

## 2019-01-01 PROCEDURE — 36224 PLACE CATH CAROTD ART: CPT

## 2019-01-01 PROCEDURE — 74011000250 HC RX REV CODE- 250: Performed by: NEUROLOGICAL SURGERY

## 2019-01-01 PROCEDURE — 74011250637 HC RX REV CODE- 250/637: Performed by: NURSE PRACTITIONER

## 2019-01-01 PROCEDURE — 74011636637 HC RX REV CODE- 636/637: Performed by: NURSE PRACTITIONER

## 2019-01-01 PROCEDURE — 77030034850

## 2019-01-01 PROCEDURE — 74011250636 HC RX REV CODE- 250/636: Performed by: NEUROLOGICAL SURGERY

## 2019-01-01 PROCEDURE — 84484 ASSAY OF TROPONIN QUANT: CPT

## 2019-01-01 PROCEDURE — 99231 SBSQ HOSP IP/OBS SF/LOW 25: CPT | Performed by: PHYSICAL MEDICINE & REHABILITATION

## 2019-01-01 PROCEDURE — 74011250636 HC RX REV CODE- 250/636: Performed by: EMERGENCY MEDICINE

## 2019-01-01 PROCEDURE — 77030020263 HC SOL INJ SOD CL0.9% LFCR 1000ML

## 2019-01-01 PROCEDURE — 84295 ASSAY OF SERUM SODIUM: CPT

## 2019-01-01 PROCEDURE — 71045 X-RAY EXAM CHEST 1 VIEW: CPT

## 2019-01-01 PROCEDURE — 74011250636 HC RX REV CODE- 250/636: Performed by: NURSE PRACTITIONER

## 2019-01-01 PROCEDURE — 74011250637 HC RX REV CODE- 250/637: Performed by: NEUROLOGICAL SURGERY

## 2019-01-01 PROCEDURE — 65610000001 HC ROOM ICU GENERAL

## 2019-01-01 PROCEDURE — 31500 INSERT EMERGENCY AIRWAY: CPT | Performed by: EMERGENCY MEDICINE

## 2019-01-01 PROCEDURE — 77030018798 HC PMP KT ENTRL FED COVD -A

## 2019-01-01 PROCEDURE — C1769 GUIDE WIRE: HCPCS

## 2019-01-01 PROCEDURE — 99152 MOD SED SAME PHYS/QHP 5/>YRS: CPT

## 2019-01-01 PROCEDURE — 83735 ASSAY OF MAGNESIUM: CPT

## 2019-01-01 PROCEDURE — 80202 ASSAY OF VANCOMYCIN: CPT

## 2019-01-01 PROCEDURE — 77030025848 HC COIL EMB DTCHR AXIM MEDT -C

## 2019-01-01 PROCEDURE — C8929 TTE W OR WO FOL WCON,DOPPLER: HCPCS

## 2019-01-01 PROCEDURE — 74011000250 HC RX REV CODE- 250

## 2019-01-01 PROCEDURE — 82550 ASSAY OF CK (CPK): CPT

## 2019-01-01 PROCEDURE — 94003 VENT MGMT INPAT SUBQ DAY: CPT

## 2019-01-01 PROCEDURE — 82962 GLUCOSE BLOOD TEST: CPT

## 2019-01-01 PROCEDURE — 74011000258 HC RX REV CODE- 258: Performed by: NEUROLOGICAL SURGERY

## 2019-01-01 PROCEDURE — 99232 SBSQ HOSP IP/OBS MODERATE 35: CPT | Performed by: NEUROLOGICAL SURGERY

## 2019-01-01 PROCEDURE — 74011000258 HC RX REV CODE- 258: Performed by: NURSE PRACTITIONER

## 2019-01-01 PROCEDURE — C1887 CATHETER, GUIDING: HCPCS

## 2019-01-01 PROCEDURE — 03LG3DZ OCCLUSION OF INTRACRANIAL ARTERY WITH INTRALUMINAL DEVICE, PERCUTANEOUS APPROACH: ICD-10-PCS | Performed by: NEUROLOGICAL SURGERY

## 2019-01-01 PROCEDURE — 84478 ASSAY OF TRIGLYCERIDES: CPT

## 2019-01-01 PROCEDURE — 99285 EMERGENCY DEPT VISIT HI MDM: CPT | Performed by: EMERGENCY MEDICINE

## 2019-01-01 PROCEDURE — 70450 CT HEAD/BRAIN W/O DYE: CPT

## 2019-01-01 PROCEDURE — 74011000250 HC RX REV CODE- 250: Performed by: NURSE PRACTITIONER

## 2019-01-01 PROCEDURE — 77030020256 HC SOL INJ NACL 0.9%  500ML

## 2019-01-01 PROCEDURE — 65270000029 HC RM PRIVATE

## 2019-01-01 PROCEDURE — 86900 BLOOD TYPING SEROLOGIC ABO: CPT

## 2019-01-01 PROCEDURE — C1894 INTRO/SHEATH, NON-LASER: HCPCS

## 2019-01-01 PROCEDURE — 74011636320 HC RX REV CODE- 636/320: Performed by: NEUROLOGICAL SURGERY

## 2019-01-01 PROCEDURE — 77030039270 HC TU BLD FLTR CARD -A

## 2019-01-01 PROCEDURE — 85027 COMPLETE CBC AUTOMATED: CPT

## 2019-01-01 PROCEDURE — 80048 BASIC METABOLIC PNL TOTAL CA: CPT

## 2019-01-01 PROCEDURE — 94760 N-INVAS EAR/PLS OXIMETRY 1: CPT

## 2019-01-01 PROCEDURE — 95813 EEG EXTND MNTR 61-119 MIN: CPT | Performed by: NEUROLOGICAL SURGERY

## 2019-01-01 PROCEDURE — 85025 COMPLETE CBC W/AUTO DIFF WBC: CPT

## 2019-01-01 PROCEDURE — 77030038671 HC COIL 3D AXM PRM DETCH X-SFT MEDT -H

## 2019-01-01 PROCEDURE — 74011250637 HC RX REV CODE- 250/637

## 2019-01-01 PROCEDURE — 86923 COMPATIBILITY TEST ELECTRIC: CPT

## 2019-01-01 PROCEDURE — 0042T CT PERF W CBF: CPT

## 2019-01-01 PROCEDURE — 86022 PLATELET ANTIBODIES: CPT

## 2019-01-01 PROCEDURE — 70496 CT ANGIOGRAPHY HEAD: CPT

## 2019-01-01 PROCEDURE — 94002 VENT MGMT INPAT INIT DAY: CPT

## 2019-01-01 PROCEDURE — 77030005402 HC CATH RAD ART LN KT TELE -B

## 2019-01-01 PROCEDURE — 99233 SBSQ HOSP IP/OBS HIGH 50: CPT | Performed by: NEUROLOGICAL SURGERY

## 2019-01-01 PROCEDURE — 99153 MOD SED SAME PHYS/QHP EA: CPT

## 2019-01-01 PROCEDURE — 0BH17EZ INSERTION OF ENDOTRACHEAL AIRWAY INTO TRACHEA, VIA NATURAL OR ARTIFICIAL OPENING: ICD-10-PCS | Performed by: EMERGENCY MEDICINE

## 2019-01-01 PROCEDURE — 82803 BLOOD GASES ANY COMBINATION: CPT

## 2019-01-01 PROCEDURE — 74011250636 HC RX REV CODE- 250/636

## 2019-01-01 PROCEDURE — 77030005401 HC CATH RAD ARRO -A

## 2019-01-01 PROCEDURE — 36556 INSERT NON-TUNNEL CV CATH: CPT | Performed by: NURSE PRACTITIONER

## 2019-01-01 PROCEDURE — C9254 INJECTION, LACOSAMIDE: HCPCS | Performed by: NEUROLOGICAL SURGERY

## 2019-01-01 PROCEDURE — 70551 MRI BRAIN STEM W/O DYE: CPT

## 2019-01-01 PROCEDURE — 85610 PROTHROMBIN TIME: CPT

## 2019-01-01 PROCEDURE — 84132 ASSAY OF SERUM POTASSIUM: CPT

## 2019-01-01 PROCEDURE — 77030004566 HC CATH ANGI DX TORCON COOK -B

## 2019-01-01 PROCEDURE — 97163 PT EVAL HIGH COMPLEX 45 MIN: CPT

## 2019-01-01 PROCEDURE — 99291 CRITICAL CARE FIRST HOUR: CPT | Performed by: NEUROLOGICAL SURGERY

## 2019-01-01 PROCEDURE — 76377 3D RENDER W/INTRP POSTPROCES: CPT

## 2019-01-01 PROCEDURE — 86644 CMV ANTIBODY: CPT

## 2019-01-01 PROCEDURE — 36620 INSERTION CATHETER ARTERY: CPT | Performed by: NURSE PRACTITIONER

## 2019-01-01 PROCEDURE — 36600 WITHDRAWAL OF ARTERIAL BLOOD: CPT

## 2019-01-01 PROCEDURE — 03HC33Z INSERTION OF INFUSION DEVICE INTO LEFT RADIAL ARTERY, PERCUTANEOUS APPROACH: ICD-10-PCS | Performed by: NEUROLOGICAL SURGERY

## 2019-01-01 PROCEDURE — 71250 CT THORAX DX C-: CPT

## 2019-01-01 PROCEDURE — 74011000258 HC RX REV CODE- 258: Performed by: EMERGENCY MEDICINE

## 2019-01-01 PROCEDURE — 77030032490 HC SLV COMPR SCD KNE COVD -B

## 2019-01-01 PROCEDURE — 77030020257 HC SOL INJ SOD CL 0.45% 1000ML BG

## 2019-01-01 PROCEDURE — 80076 HEPATIC FUNCTION PANEL: CPT

## 2019-01-01 PROCEDURE — B3181ZZ FLUOROSCOPY OF BILATERAL INTERNAL CAROTID ARTERIES USING LOW OSMOLAR CONTRAST: ICD-10-PCS | Performed by: NEUROLOGICAL SURGERY

## 2019-01-01 PROCEDURE — 36592 COLLECT BLOOD FROM PICC: CPT

## 2019-01-01 PROCEDURE — 61624 TCAT PERM OCCLS/EMBOLJ CNS: CPT

## 2019-01-01 PROCEDURE — 77030011256 HC DRSG MEPILEX <16IN NO BORD MOLN -A

## 2019-01-01 PROCEDURE — P9016 RBC LEUKOCYTES REDUCED: HCPCS

## 2019-01-01 PROCEDURE — L1830 KO IMMOB CANVAS LONG PRE OTS: HCPCS

## 2019-01-01 PROCEDURE — 74011258636 HC RX REV CODE- 258/636: Performed by: NEUROLOGICAL SURGERY

## 2019-01-01 PROCEDURE — 93005 ELECTROCARDIOGRAM TRACING: CPT | Performed by: EMERGENCY MEDICINE

## 2019-01-01 PROCEDURE — 86803 HEPATITIS C AB TEST: CPT

## 2019-01-01 PROCEDURE — 77030013794 HC KT TRNSDUC BLD EDWD -B

## 2019-01-01 PROCEDURE — 93005 ELECTROCARDIOGRAM TRACING: CPT | Performed by: NURSE PRACTITIONER

## 2019-01-01 PROCEDURE — 99232 SBSQ HOSP IP/OBS MODERATE 35: CPT | Performed by: PHYSICAL MEDICINE & REHABILITATION

## 2019-01-01 PROCEDURE — 83036 HEMOGLOBIN GLYCOSYLATED A1C: CPT

## 2019-01-01 PROCEDURE — 75894 X-RAYS TRANSCATH THERAPY: CPT

## 2019-01-01 PROCEDURE — 99231 SBSQ HOSP IP/OBS SF/LOW 25: CPT | Performed by: NEUROLOGICAL SURGERY

## 2019-01-01 PROCEDURE — B544ZZA ULTRASONOGRAPHY OF LEFT JUGULAR VEINS, GUIDANCE: ICD-10-PCS | Performed by: NEUROLOGICAL SURGERY

## 2019-01-01 PROCEDURE — 87040 BLOOD CULTURE FOR BACTERIA: CPT

## 2019-01-01 PROCEDURE — 85018 HEMOGLOBIN: CPT

## 2019-01-01 PROCEDURE — 86359 T CELLS TOTAL COUNT: CPT

## 2019-01-01 PROCEDURE — 99223 1ST HOSP IP/OBS HIGH 75: CPT | Performed by: NEUROLOGICAL SURGERY

## 2019-01-01 PROCEDURE — 99222 1ST HOSP IP/OBS MODERATE 55: CPT | Performed by: PHYSICAL MEDICINE & REHABILITATION

## 2019-01-01 PROCEDURE — 84145 PROCALCITONIN (PCT): CPT

## 2019-01-01 PROCEDURE — 74011258636 HC RX REV CODE- 258/636: Performed by: NURSE PRACTITIONER

## 2019-01-01 PROCEDURE — 86361 T CELL ABSOLUTE COUNT: CPT

## 2019-01-01 PROCEDURE — 95822 EEG COMA OR SLEEP ONLY: CPT | Performed by: PSYCHIATRY & NEUROLOGY

## 2019-01-01 PROCEDURE — 83605 ASSAY OF LACTIC ACID: CPT

## 2019-01-01 PROCEDURE — 74018 RADEX ABDOMEN 1 VIEW: CPT

## 2019-01-01 PROCEDURE — 77030022017 HC DRSG HEMO QCLOT ZMED -A

## 2019-01-01 PROCEDURE — 99231 SBSQ HOSP IP/OBS SF/LOW 25: CPT | Performed by: NURSE PRACTITIONER

## 2019-01-01 PROCEDURE — 80053 COMPREHEN METABOLIC PANEL: CPT

## 2019-01-01 PROCEDURE — 87186 SC STD MICRODIL/AGAR DIL: CPT

## 2019-01-01 PROCEDURE — 87086 URINE CULTURE/COLONY COUNT: CPT

## 2019-01-01 PROCEDURE — 77030019605

## 2019-01-01 PROCEDURE — 80307 DRUG TEST PRSMV CHEM ANLYZR: CPT

## 2019-01-01 PROCEDURE — 81001 URINALYSIS AUTO W/SCOPE: CPT

## 2019-01-01 PROCEDURE — 77010033678 HC OXYGEN DAILY

## 2019-01-01 PROCEDURE — 77030020270 HC MISC VASC IMPL

## 2019-01-01 PROCEDURE — 87070 CULTURE OTHR SPECIMN AEROBIC: CPT

## 2019-01-01 PROCEDURE — 77030021907 HC KT URIN FOL O&M -A

## 2019-01-01 PROCEDURE — 05HN33Z INSERTION OF INFUSION DEVICE INTO LEFT INTERNAL JUGULAR VEIN, PERCUTANEOUS APPROACH: ICD-10-PCS | Performed by: NEUROLOGICAL SURGERY

## 2019-01-01 PROCEDURE — 77030020186 HC BOOT HL PROTCT SAGE -B

## 2019-01-01 PROCEDURE — 97167 OT EVAL HIGH COMPLEX 60 MIN: CPT

## 2019-01-01 PROCEDURE — 77030034112 HC PRB ESOPH/RET TEMP CSZ -A

## 2019-01-01 PROCEDURE — 77030002916 HC SUT ETHLN J&J -A

## 2019-01-01 PROCEDURE — 77030031476 HC EXCH HEAT MOISTW FLTR HALY -A

## 2019-01-01 PROCEDURE — 87088 URINE BACTERIA CULTURE: CPT

## 2019-01-01 PROCEDURE — 80061 LIPID PANEL: CPT

## 2019-01-01 PROCEDURE — 87521 HEPATITIS C PROBE&RVRS TRNSC: CPT

## 2019-01-01 PROCEDURE — 5A1955Z RESPIRATORY VENTILATION, GREATER THAN 96 CONSECUTIVE HOURS: ICD-10-PCS | Performed by: EMERGENCY MEDICINE

## 2019-01-01 PROCEDURE — 85576 BLOOD PLATELET AGGREGATION: CPT

## 2019-01-01 PROCEDURE — 77030040132 HC BLANKET THRM DISP CSZ -B

## 2019-01-01 PROCEDURE — 97530 THERAPEUTIC ACTIVITIES: CPT

## 2019-01-01 RX ORDER — FENTANYL CITRATE 50 UG/ML
100 INJECTION, SOLUTION INTRAMUSCULAR; INTRAVENOUS ONCE
Status: COMPLETED | OUTPATIENT
Start: 2019-01-01 | End: 2019-01-01

## 2019-01-01 RX ORDER — SODIUM CHLORIDE 9 MG/ML
250 INJECTION, SOLUTION INTRAVENOUS AS NEEDED
Status: DISCONTINUED | OUTPATIENT
Start: 2019-01-01 | End: 2019-01-01

## 2019-01-01 RX ORDER — METOPROLOL TARTRATE 25 MG/1
25 TABLET, FILM COATED ORAL 2 TIMES DAILY
Status: DISCONTINUED | OUTPATIENT
Start: 2019-01-01 | End: 2019-01-01

## 2019-01-01 RX ORDER — FUROSEMIDE 10 MG/ML
40 INJECTION INTRAMUSCULAR; INTRAVENOUS ONCE
Status: COMPLETED | OUTPATIENT
Start: 2019-01-01 | End: 2019-01-01

## 2019-01-01 RX ORDER — SODIUM CHLORIDE 9 MG/ML
1000 INJECTION, SOLUTION INTRAVENOUS ONCE
Status: COMPLETED | OUTPATIENT
Start: 2019-01-01 | End: 2019-01-01

## 2019-01-01 RX ORDER — NICARDIPINE HYDROCHLORIDE 0.1 MG/ML
0-15 INJECTION INTRAVENOUS
Status: DISCONTINUED | OUTPATIENT
Start: 2019-01-01 | End: 2019-01-01 | Stop reason: SDUPTHER

## 2019-01-01 RX ORDER — POTASSIUM CHLORIDE 14.9 MG/ML
20 INJECTION INTRAVENOUS ONCE
Status: COMPLETED | OUTPATIENT
Start: 2019-01-01 | End: 2019-01-01

## 2019-01-01 RX ORDER — IODIXANOL 320 MG/ML
1-200 INJECTION, SOLUTION INTRAVASCULAR
Status: COMPLETED | OUTPATIENT
Start: 2019-01-01 | End: 2019-01-01

## 2019-01-01 RX ORDER — LORAZEPAM 2 MG/ML
2 INJECTION INTRAMUSCULAR ONCE
Status: COMPLETED | OUTPATIENT
Start: 2019-01-01 | End: 2019-01-01

## 2019-01-01 RX ORDER — PANTOPRAZOLE SODIUM 40 MG/1
40 TABLET, DELAYED RELEASE ORAL DAILY
Status: DISCONTINUED | OUTPATIENT
Start: 2019-01-01 | End: 2019-01-01

## 2019-01-01 RX ORDER — ONDANSETRON 2 MG/ML
4 INJECTION INTRAMUSCULAR; INTRAVENOUS
Status: DISCONTINUED | OUTPATIENT
Start: 2019-01-01 | End: 2019-01-01 | Stop reason: SDUPTHER

## 2019-01-01 RX ORDER — AMOXICILLIN 250 MG
2 CAPSULE ORAL
Status: DISCONTINUED | OUTPATIENT
Start: 2019-01-01 | End: 2019-01-01

## 2019-01-01 RX ORDER — LORAZEPAM 2 MG/ML
1 INJECTION INTRAMUSCULAR
Status: DISCONTINUED | OUTPATIENT
Start: 2019-01-01 | End: 2019-03-04 | Stop reason: HOSPADM

## 2019-01-01 RX ORDER — PHENYLEPHRINE HCL IN 0.9% NACL 30MG/250ML
10-100 PLASTIC BAG, INJECTION (ML) INTRAVENOUS
Status: DISCONTINUED | OUTPATIENT
Start: 2019-01-01 | End: 2019-01-01

## 2019-01-01 RX ORDER — SUCCINYLCHOLINE CHLORIDE 20 MG/ML
100 INJECTION INTRAMUSCULAR; INTRAVENOUS
Status: COMPLETED | OUTPATIENT
Start: 2019-01-01 | End: 2019-01-01

## 2019-01-01 RX ORDER — SODIUM CHLORIDE 0.9 % (FLUSH) 0.9 %
10 SYRINGE (ML) INJECTION
Status: COMPLETED | OUTPATIENT
Start: 2019-01-01 | End: 2019-01-01

## 2019-01-01 RX ORDER — ACETAMINOPHEN 325 MG/1
650 TABLET ORAL
Status: DISCONTINUED | OUTPATIENT
Start: 2019-01-01 | End: 2019-01-01

## 2019-01-01 RX ORDER — SODIUM CHLORIDE 3 G/100ML
250 INJECTION, SOLUTION INTRAVENOUS EVERY 6 HOURS
Status: COMPLETED | OUTPATIENT
Start: 2019-01-01 | End: 2019-01-01

## 2019-01-01 RX ORDER — MORPHINE SULFATE 2 MG/ML
2 INJECTION, SOLUTION INTRAMUSCULAR; INTRAVENOUS
Status: DISCONTINUED | OUTPATIENT
Start: 2019-01-01 | End: 2019-01-01

## 2019-01-01 RX ORDER — PROPOFOL 10 MG/ML
0-50 VIAL (ML) INTRAVENOUS
Status: DISCONTINUED | OUTPATIENT
Start: 2019-01-01 | End: 2019-01-01

## 2019-01-01 RX ORDER — LANSOPRAZOLE 30 MG/1
30 TABLET, ORALLY DISINTEGRATING, DELAYED RELEASE ORAL
Status: DISCONTINUED | OUTPATIENT
Start: 2019-01-01 | End: 2019-01-01

## 2019-01-01 RX ORDER — ATROPINE SULFATE 10 MG/ML
3 SOLUTION/ DROPS OPHTHALMIC
Status: DISCONTINUED | OUTPATIENT
Start: 2019-01-01 | End: 2019-03-04 | Stop reason: HOSPADM

## 2019-01-01 RX ORDER — SODIUM BICARBONATE 84 MG/ML
100 INJECTION, SOLUTION INTRAVENOUS ONCE
Status: COMPLETED | OUTPATIENT
Start: 2019-01-01 | End: 2019-01-01

## 2019-01-01 RX ORDER — METOPROLOL TARTRATE 5 MG/5ML
INJECTION INTRAVENOUS
Status: ACTIVE
Start: 2019-01-01 | End: 2019-01-01

## 2019-01-01 RX ORDER — HEPARIN SODIUM 5000 [USP'U]/ML
5000 INJECTION, SOLUTION INTRAVENOUS; SUBCUTANEOUS EVERY 8 HOURS
Status: DISCONTINUED | OUTPATIENT
Start: 2019-01-01 | End: 2019-01-01

## 2019-01-01 RX ORDER — ACETAMINOPHEN 325 MG/1
650 TABLET ORAL
Status: DISCONTINUED | OUTPATIENT
Start: 2019-01-01 | End: 2019-01-01 | Stop reason: CLARIF

## 2019-01-01 RX ORDER — GUAIFENESIN 100 MG/5ML
81 LIQUID (ML) ORAL DAILY
Status: DISCONTINUED | OUTPATIENT
Start: 2019-01-01 | End: 2019-01-01

## 2019-01-01 RX ORDER — SODIUM CHLORIDE, SODIUM LACTATE, POTASSIUM CHLORIDE, CALCIUM CHLORIDE 600; 310; 30; 20 MG/100ML; MG/100ML; MG/100ML; MG/100ML
175 INJECTION, SOLUTION INTRAVENOUS CONTINUOUS
Status: DISCONTINUED | OUTPATIENT
Start: 2019-01-01 | End: 2019-01-01 | Stop reason: HOSPADM

## 2019-01-01 RX ORDER — MIDAZOLAM HYDROCHLORIDE 1 MG/ML
4 INJECTION, SOLUTION INTRAMUSCULAR; INTRAVENOUS
Status: DISCONTINUED | OUTPATIENT
Start: 2019-01-01 | End: 2019-01-01

## 2019-01-01 RX ORDER — FENTANYL CITRATE 50 UG/ML
INJECTION, SOLUTION INTRAMUSCULAR; INTRAVENOUS
Status: ACTIVE
Start: 2019-01-01 | End: 2019-01-01

## 2019-01-01 RX ORDER — VANCOMYCIN/0.9 % SOD CHLORIDE 1.5G/250ML
1500 PLASTIC BAG, INJECTION (ML) INTRAVENOUS ONCE
Status: COMPLETED | OUTPATIENT
Start: 2019-01-01 | End: 2019-01-01

## 2019-01-01 RX ORDER — IBUPROFEN 600 MG/1
600 TABLET ORAL
Status: DISCONTINUED | OUTPATIENT
Start: 2019-01-01 | End: 2019-01-01

## 2019-01-01 RX ORDER — AMANTADINE HYDROCHLORIDE 50 MG/5ML
100 SOLUTION ORAL EVERY OTHER DAY
Status: DISCONTINUED | OUTPATIENT
Start: 2019-01-01 | End: 2019-01-01 | Stop reason: ALTCHOICE

## 2019-01-01 RX ORDER — HEPARIN SODIUM 5000 [USP'U]/ML
5000 INJECTION, SOLUTION INTRAVENOUS; SUBCUTANEOUS EVERY 12 HOURS
Status: DISCONTINUED | OUTPATIENT
Start: 2019-01-01 | End: 2019-01-01

## 2019-01-01 RX ORDER — MANNITOL 20 G/100ML
50 INJECTION, SOLUTION INTRAVENOUS ONCE
Status: DISCONTINUED | OUTPATIENT
Start: 2019-01-01 | End: 2019-01-01

## 2019-01-01 RX ORDER — POLYVINYL ALCOHOL 14 MG/ML
1 SOLUTION/ DROPS OPHTHALMIC AS NEEDED
Status: DISCONTINUED | OUTPATIENT
Start: 2019-01-01 | End: 2019-03-04 | Stop reason: HOSPADM

## 2019-01-01 RX ORDER — INSULIN LISPRO 100 [IU]/ML
INJECTION, SOLUTION INTRAVENOUS; SUBCUTANEOUS EVERY 6 HOURS
Status: DISCONTINUED | OUTPATIENT
Start: 2019-01-01 | End: 2019-01-01

## 2019-01-01 RX ORDER — VENLAFAXINE HYDROCHLORIDE 37.5 MG/1
150 CAPSULE, EXTENDED RELEASE ORAL DAILY
Status: DISCONTINUED | OUTPATIENT
Start: 2019-01-01 | End: 2019-01-01

## 2019-01-01 RX ORDER — BISACODYL 5 MG
5 TABLET, DELAYED RELEASE (ENTERIC COATED) ORAL DAILY PRN
Status: DISCONTINUED | OUTPATIENT
Start: 2019-01-01 | End: 2019-01-01

## 2019-01-01 RX ORDER — SODIUM CHLORIDE 9 MG/ML
50 INJECTION, SOLUTION INTRAVENOUS CONTINUOUS
Status: DISCONTINUED | OUTPATIENT
Start: 2019-01-01 | End: 2019-01-01

## 2019-01-01 RX ORDER — ETOMIDATE 2 MG/ML
10 INJECTION INTRAVENOUS ONCE
Status: COMPLETED | OUTPATIENT
Start: 2019-01-01 | End: 2019-01-01

## 2019-01-01 RX ORDER — FENTANYL CITRATE 50 UG/ML
50 INJECTION, SOLUTION INTRAMUSCULAR; INTRAVENOUS
Status: DISCONTINUED | OUTPATIENT
Start: 2019-01-01 | End: 2019-01-01

## 2019-01-01 RX ORDER — ETOMIDATE 2 MG/ML
INJECTION INTRAVENOUS
Status: COMPLETED
Start: 2019-01-01 | End: 2019-01-01

## 2019-01-01 RX ORDER — SODIUM CHLORIDE 3 G/100ML
250 INJECTION, SOLUTION INTRAVENOUS EVERY 6 HOURS
Status: DISCONTINUED | OUTPATIENT
Start: 2019-01-01 | End: 2019-01-01

## 2019-01-01 RX ORDER — AMANTADINE HYDROCHLORIDE 50 MG/5ML
100 SOLUTION ORAL DAILY
Status: DISCONTINUED | OUTPATIENT
Start: 2019-01-01 | End: 2019-01-01

## 2019-01-01 RX ORDER — ASPIRIN 325 MG
325 TABLET ORAL ONCE
Status: COMPLETED | OUTPATIENT
Start: 2019-01-01 | End: 2019-01-01

## 2019-01-01 RX ORDER — VENLAFAXINE 25 MG/1
50 TABLET ORAL
Status: DISCONTINUED | OUTPATIENT
Start: 2019-01-01 | End: 2019-01-01

## 2019-01-01 RX ORDER — PROPOFOL 10 MG/ML
0-50 VIAL (ML) INTRAVENOUS
Status: DISCONTINUED | OUTPATIENT
Start: 2019-01-01 | End: 2019-01-01 | Stop reason: HOSPADM

## 2019-01-01 RX ORDER — SODIUM CHLORIDE 9 MG/ML
2000 INJECTION, SOLUTION INTRAVENOUS ONCE
Status: COMPLETED | OUTPATIENT
Start: 2019-01-01 | End: 2019-01-01

## 2019-01-01 RX ORDER — METOPROLOL TARTRATE 50 MG/1
50 TABLET ORAL 2 TIMES DAILY
Status: DISCONTINUED | OUTPATIENT
Start: 2019-01-01 | End: 2019-01-01

## 2019-01-01 RX ORDER — ATORVASTATIN CALCIUM 40 MG/1
40 TABLET, FILM COATED ORAL
Status: DISCONTINUED | OUTPATIENT
Start: 2019-01-01 | End: 2019-01-01

## 2019-01-01 RX ORDER — FUROSEMIDE 10 MG/ML
INJECTION INTRAMUSCULAR; INTRAVENOUS
Status: COMPLETED
Start: 2019-01-01 | End: 2019-01-01

## 2019-01-01 RX ORDER — ABACAVIR 300 MG/1
300 TABLET ORAL EVERY 12 HOURS
Status: DISCONTINUED | OUTPATIENT
Start: 2019-01-01 | End: 2019-01-01

## 2019-01-01 RX ORDER — ABACAVIR 300 MG/1
300 TABLET ORAL EVERY 12 HOURS
Status: DISCONTINUED | OUTPATIENT
Start: 2019-01-01 | End: 2019-01-01 | Stop reason: SDUPTHER

## 2019-01-01 RX ORDER — SCOLOPAMINE TRANSDERMAL SYSTEM 1 MG/1
1 PATCH, EXTENDED RELEASE TRANSDERMAL
Status: DISCONTINUED | OUTPATIENT
Start: 2019-01-01 | End: 2019-03-04 | Stop reason: HOSPADM

## 2019-01-01 RX ORDER — NOREPINEPHRINE BIT/0.9 % NACL 4MG/250ML
0-16 PLASTIC BAG, INJECTION (ML) INTRAVENOUS
Status: DISCONTINUED | OUTPATIENT
Start: 2019-01-01 | End: 2019-01-01

## 2019-01-01 RX ORDER — FENTANYL CITRATE 50 UG/ML
50 INJECTION, SOLUTION INTRAMUSCULAR; INTRAVENOUS
Status: DISCONTINUED | OUTPATIENT
Start: 2019-01-01 | End: 2019-01-01 | Stop reason: SDUPTHER

## 2019-01-01 RX ORDER — FENTANYL CITRATE 50 UG/ML
INJECTION, SOLUTION INTRAMUSCULAR; INTRAVENOUS
Status: COMPLETED
Start: 2019-01-01 | End: 2019-01-01

## 2019-01-01 RX ORDER — LIDOCAINE HYDROCHLORIDE 10 MG/ML
2-20 INJECTION, SOLUTION EPIDURAL; INFILTRATION; INTRACAUDAL; PERINEURAL ONCE
Status: COMPLETED | OUTPATIENT
Start: 2019-01-01 | End: 2019-01-01

## 2019-01-01 RX ORDER — FACIAL-BODY WIPES
10 EACH TOPICAL ONCE
Status: COMPLETED | OUTPATIENT
Start: 2019-01-01 | End: 2019-01-01

## 2019-01-01 RX ORDER — SODIUM CHLORIDE 9 MG/ML
75 INJECTION, SOLUTION INTRAVENOUS CONTINUOUS
Status: DISCONTINUED | OUTPATIENT
Start: 2019-01-01 | End: 2019-01-01

## 2019-01-01 RX ORDER — FENTANYL CITRATE 50 UG/ML
50 INJECTION, SOLUTION INTRAMUSCULAR; INTRAVENOUS
Status: COMPLETED | OUTPATIENT
Start: 2019-01-01 | End: 2019-01-01

## 2019-01-01 RX ORDER — LANOLIN ALCOHOL/MO/W.PET/CERES
400 CREAM (GRAM) TOPICAL 2 TIMES DAILY
Status: DISCONTINUED | OUTPATIENT
Start: 2019-01-01 | End: 2019-01-01

## 2019-01-01 RX ORDER — METHYLPHENIDATE HYDROCHLORIDE 5 MG/1
5 TABLET ORAL 2 TIMES DAILY
Status: DISCONTINUED | OUTPATIENT
Start: 2019-01-01 | End: 2019-01-01

## 2019-01-01 RX ORDER — NIMODIPINE 30 MG/1
60 CAPSULE, LIQUID FILLED ORAL EVERY 4 HOURS
Status: DISCONTINUED | OUTPATIENT
Start: 2019-01-01 | End: 2019-01-01 | Stop reason: SDUPTHER

## 2019-01-01 RX ORDER — FENTANYL CITRATE 50 UG/ML
100 INJECTION, SOLUTION INTRAMUSCULAR; INTRAVENOUS
Status: DISCONTINUED | OUTPATIENT
Start: 2019-01-01 | End: 2019-01-01

## 2019-01-01 RX ORDER — MORPHINE SULFATE 4 MG/ML
4 INJECTION INTRAVENOUS
Status: DISCONTINUED | OUTPATIENT
Start: 2019-01-01 | End: 2019-03-04 | Stop reason: HOSPADM

## 2019-01-01 RX ORDER — METOPROLOL TARTRATE 5 MG/5ML
5 INJECTION INTRAVENOUS ONCE
Status: COMPLETED | OUTPATIENT
Start: 2019-01-01 | End: 2019-01-01

## 2019-01-01 RX ORDER — MAGNESIUM SULFATE HEPTAHYDRATE 40 MG/ML
4 INJECTION, SOLUTION INTRAVENOUS ONCE
Status: COMPLETED | OUTPATIENT
Start: 2019-01-01 | End: 2019-01-01

## 2019-01-01 RX ORDER — FENTANYL CITRATE 50 UG/ML
100 INJECTION, SOLUTION INTRAMUSCULAR; INTRAVENOUS ONCE
Status: DISCONTINUED | OUTPATIENT
Start: 2019-01-01 | End: 2019-01-01

## 2019-01-01 RX ORDER — AMANTADINE HYDROCHLORIDE 100 MG/1
100 CAPSULE, GELATIN COATED ORAL EVERY OTHER DAY
Status: DISCONTINUED | OUTPATIENT
Start: 2019-01-01 | End: 2019-01-01

## 2019-01-01 RX ORDER — ASPIRIN 325 MG
650 TABLET ORAL ONCE
Status: COMPLETED | OUTPATIENT
Start: 2019-01-01 | End: 2019-01-01

## 2019-01-01 RX ORDER — CHLORHEXIDINE GLUCONATE 1.2 MG/ML
15 RINSE ORAL EVERY 12 HOURS
Status: DISCONTINUED | OUTPATIENT
Start: 2019-01-01 | End: 2019-01-01

## 2019-01-01 RX ORDER — ONDANSETRON 2 MG/ML
4 INJECTION INTRAMUSCULAR; INTRAVENOUS
Status: DISCONTINUED | OUTPATIENT
Start: 2019-01-01 | End: 2019-03-04 | Stop reason: HOSPADM

## 2019-01-01 RX ORDER — LIDOCAINE HYDROCHLORIDE 20 MG/ML
2-20 INJECTION, SOLUTION INFILTRATION; PERINEURAL ONCE
Status: DISCONTINUED | OUTPATIENT
Start: 2019-01-01 | End: 2019-01-01

## 2019-01-01 RX ORDER — ABACAVIR 20 MG/ML
300 SOLUTION ORAL EVERY 12 HOURS
Status: DISCONTINUED | OUTPATIENT
Start: 2019-01-01 | End: 2019-01-01

## 2019-01-01 RX ORDER — HEPARIN SODIUM 200 [USP'U]/100ML
1000 INJECTION, SOLUTION INTRAVENOUS AS NEEDED
Status: DISCONTINUED | OUTPATIENT
Start: 2019-01-01 | End: 2019-01-01

## 2019-01-01 RX ORDER — NALOXONE HYDROCHLORIDE 0.4 MG/ML
0.4 INJECTION, SOLUTION INTRAMUSCULAR; INTRAVENOUS; SUBCUTANEOUS
Status: COMPLETED | OUTPATIENT
Start: 2019-01-01 | End: 2019-01-01

## 2019-01-01 RX ORDER — POTASSIUM CHLORIDE 1.5 G/1.77G
40 POWDER, FOR SOLUTION ORAL 2 TIMES DAILY
Status: DISCONTINUED | OUTPATIENT
Start: 2019-01-01 | End: 2019-01-01

## 2019-01-01 RX ORDER — SUCCINYLCHOLINE CHLORIDE 20 MG/ML
INJECTION INTRAMUSCULAR; INTRAVENOUS
Status: COMPLETED
Start: 2019-01-01 | End: 2019-01-01

## 2019-01-01 RX ORDER — RITONAVIR 100 MG/1
100 TABLET ORAL
Status: DISCONTINUED | OUTPATIENT
Start: 2019-01-01 | End: 2019-01-01

## 2019-01-01 RX ORDER — SODIUM CHLORIDE 450 MG/100ML
125 INJECTION, SOLUTION INTRAVENOUS CONTINUOUS
Status: DISCONTINUED | OUTPATIENT
Start: 2019-01-01 | End: 2019-01-01

## 2019-01-01 RX ORDER — MIDAZOLAM HYDROCHLORIDE 1 MG/ML
2-4 INJECTION, SOLUTION INTRAMUSCULAR; INTRAVENOUS
Status: DISCONTINUED | OUTPATIENT
Start: 2019-01-01 | End: 2019-01-01

## 2019-01-01 RX ADMIN — FENTANYL CITRATE 100 MCG: 50 INJECTION, SOLUTION INTRAMUSCULAR; INTRAVENOUS at 21:56

## 2019-01-01 RX ADMIN — VENLAFAXINE 50 MG: 25 TABLET ORAL at 17:08

## 2019-01-01 RX ADMIN — VENLAFAXINE 50 MG: 25 TABLET ORAL at 08:33

## 2019-01-01 RX ADMIN — INSULIN LISPRO 2 UNITS: 100 INJECTION, SOLUTION INTRAVENOUS; SUBCUTANEOUS at 00:34

## 2019-01-01 RX ADMIN — SODIUM CHLORIDE 750 MG: 900 INJECTION, SOLUTION INTRAVENOUS at 20:08

## 2019-01-01 RX ADMIN — NIMODIPINE 30 MG: 30 CAPSULE, LIQUID FILLED ORAL at 16:41

## 2019-01-01 RX ADMIN — VENLAFAXINE 50 MG: 25 TABLET ORAL at 11:16

## 2019-01-01 RX ADMIN — Medication 400 MG: at 17:08

## 2019-01-01 RX ADMIN — SODIUM CHLORIDE 100 ML: 900 INJECTION, SOLUTION INTRAVENOUS at 04:16

## 2019-01-01 RX ADMIN — NIMODIPINE 30 MG: 30 CAPSULE, LIQUID FILLED ORAL at 23:44

## 2019-01-01 RX ADMIN — SODIUM CHLORIDE 125 ML/HR: 450 INJECTION, SOLUTION INTRAVENOUS at 09:51

## 2019-01-01 RX ADMIN — INSULIN LISPRO 2 UNITS: 100 INJECTION, SOLUTION INTRAVENOUS; SUBCUTANEOUS at 05:13

## 2019-01-01 RX ADMIN — SODIUM CHLORIDE 7.5 MG/HR: 900 INJECTION, SOLUTION INTRAVENOUS at 07:20

## 2019-01-01 RX ADMIN — IBUPROFEN 600 MG: 600 TABLET ORAL at 13:17

## 2019-01-01 RX ADMIN — METOPROLOL TARTRATE 50 MG: 50 TABLET ORAL at 18:06

## 2019-01-01 RX ADMIN — METOPROLOL TARTRATE 50 MG: 50 TABLET ORAL at 17:15

## 2019-01-01 RX ADMIN — POTASSIUM CHLORIDE 40 MEQ: 1.5 POWDER, FOR SOLUTION ORAL at 11:35

## 2019-01-01 RX ADMIN — Medication 400 MG: at 08:28

## 2019-01-01 RX ADMIN — RITONAVIR 100 MG: 100 POWDER ORAL at 05:52

## 2019-01-01 RX ADMIN — IBUPROFEN 600 MG: 600 TABLET ORAL at 06:10

## 2019-01-01 RX ADMIN — INSULIN LISPRO 2 UNITS: 100 INJECTION, SOLUTION INTRAVENOUS; SUBCUTANEOUS at 12:18

## 2019-01-01 RX ADMIN — LORAZEPAM 1 MG: 2 INJECTION INTRAMUSCULAR; INTRAVENOUS at 10:28

## 2019-01-01 RX ADMIN — Medication 10 ML: at 04:16

## 2019-01-01 RX ADMIN — DARUNAVIR 800 MG: 800 TABLET, FILM COATED ORAL at 05:52

## 2019-01-01 RX ADMIN — SODIUM CHLORIDE 125 ML/HR: 450 INJECTION, SOLUTION INTRAVENOUS at 02:01

## 2019-01-01 RX ADMIN — SENNOSIDES AND DOCUSATE SODIUM 2 TABLET: 8.6; 5 TABLET ORAL at 22:11

## 2019-01-01 RX ADMIN — INSULIN LISPRO 2 UNITS: 100 INJECTION, SOLUTION INTRAVENOUS; SUBCUTANEOUS at 05:26

## 2019-01-01 RX ADMIN — FENTANYL CITRATE 50 MCG: 50 INJECTION INTRAMUSCULAR; INTRAVENOUS at 10:32

## 2019-01-01 RX ADMIN — NIMODIPINE 30 MG: 30 CAPSULE, LIQUID FILLED ORAL at 08:16

## 2019-01-01 RX ADMIN — Medication 400 MG: at 09:24

## 2019-01-01 RX ADMIN — NIMODIPINE 30 MG: 30 CAPSULE, LIQUID FILLED ORAL at 17:16

## 2019-01-01 RX ADMIN — SENNOSIDES AND DOCUSATE SODIUM 2 TABLET: 8.6; 5 TABLET ORAL at 21:25

## 2019-01-01 RX ADMIN — Medication 400 MG: at 09:59

## 2019-01-01 RX ADMIN — CHLORHEXIDINE GLUCONATE 15 ML: 1.2 RINSE ORAL at 20:09

## 2019-01-01 RX ADMIN — VENLAFAXINE 50 MG: 25 TABLET ORAL at 12:01

## 2019-01-01 RX ADMIN — PROPOFOL 50 MCG/KG/MIN: 10 INJECTION, EMULSION INTRAVENOUS at 10:55

## 2019-01-01 RX ADMIN — CHLORHEXIDINE GLUCONATE 15 ML: 1.2 RINSE ORAL at 09:17

## 2019-01-01 RX ADMIN — SODIUM CHLORIDE 1000 MG: 900 INJECTION, SOLUTION INTRAVENOUS at 21:18

## 2019-01-01 RX ADMIN — SODIUM CHLORIDE 50 ML/HR: 900 INJECTION, SOLUTION INTRAVENOUS at 03:22

## 2019-01-01 RX ADMIN — SODIUM CHLORIDE 2.5 MG/HR: 900 INJECTION, SOLUTION INTRAVENOUS at 22:50

## 2019-01-01 RX ADMIN — NIMODIPINE 30 MG: 30 CAPSULE, LIQUID FILLED ORAL at 23:18

## 2019-01-01 RX ADMIN — NIMODIPINE 30 MG: 30 CAPSULE, LIQUID FILLED ORAL at 08:35

## 2019-01-01 RX ADMIN — NALOXONE HYDROCHLORIDE 0.4 MG: 0.4 INJECTION, SOLUTION INTRAMUSCULAR; INTRAVENOUS; SUBCUTANEOUS at 17:47

## 2019-01-01 RX ADMIN — SODIUM CHLORIDE 1 G: 900 INJECTION, SOLUTION INTRAVENOUS at 01:04

## 2019-01-01 RX ADMIN — LORAZEPAM 1 MG: 2 INJECTION INTRAMUSCULAR; INTRAVENOUS at 08:37

## 2019-01-01 RX ADMIN — DARUNAVIR 800 MG: 800 TABLET, FILM COATED ORAL at 06:16

## 2019-01-01 RX ADMIN — MORPHINE SULFATE 4 MG: 4 INJECTION INTRAVENOUS at 12:27

## 2019-01-01 RX ADMIN — INSULIN LISPRO 2 UNITS: 100 INJECTION, SOLUTION INTRAVENOUS; SUBCUTANEOUS at 23:44

## 2019-01-01 RX ADMIN — SODIUM CHLORIDE 2.5 MG/HR: 900 INJECTION, SOLUTION INTRAVENOUS at 20:24

## 2019-01-01 RX ADMIN — VENLAFAXINE 50 MG: 25 TABLET ORAL at 07:39

## 2019-01-01 RX ADMIN — POTASSIUM CHLORIDE 20 MEQ: 200 INJECTION, SOLUTION INTRAVENOUS at 06:25

## 2019-01-01 RX ADMIN — SODIUM CHLORIDE 250 ML: 3 INJECTION, SOLUTION INTRAVENOUS at 22:03

## 2019-01-01 RX ADMIN — NOREPINEPHRINE BITARTRATE 16 MCG/MIN: 1 INJECTION INTRAVENOUS at 11:23

## 2019-01-01 RX ADMIN — METOPROLOL TARTRATE 50 MG: 50 TABLET ORAL at 09:04

## 2019-01-01 RX ADMIN — ACETAMINOPHEN 650 MG: 325 TABLET, FILM COATED ORAL at 04:10

## 2019-01-01 RX ADMIN — ATORVASTATIN CALCIUM 40 MG: 40 TABLET, FILM COATED ORAL at 00:07

## 2019-01-01 RX ADMIN — MIDAZOLAM HYDROCHLORIDE 2 MG: 1 INJECTION, SOLUTION INTRAMUSCULAR; INTRAVENOUS at 13:03

## 2019-01-01 RX ADMIN — INSULIN LISPRO 2 UNITS: 100 INJECTION, SOLUTION INTRAVENOUS; SUBCUTANEOUS at 05:47

## 2019-01-01 RX ADMIN — ACETAMINOPHEN 650 MG: 325 TABLET, FILM COATED ORAL at 04:05

## 2019-01-01 RX ADMIN — SODIUM CHLORIDE 50 ML/HR: 900 INJECTION, SOLUTION INTRAVENOUS at 17:53

## 2019-01-01 RX ADMIN — METHYLPHENIDATE HYDROCHLORIDE 5 MG: 5 TABLET ORAL at 17:08

## 2019-01-01 RX ADMIN — SENNOSIDES AND DOCUSATE SODIUM 2 TABLET: 8.6; 5 TABLET ORAL at 22:20

## 2019-01-01 RX ADMIN — VANCOMYCIN HYDROCHLORIDE 750 MG: 10 INJECTION, POWDER, LYOPHILIZED, FOR SOLUTION INTRAVENOUS at 06:13

## 2019-01-01 RX ADMIN — FENTANYL CITRATE 100 MCG: 50 INJECTION, SOLUTION INTRAMUSCULAR; INTRAVENOUS at 11:36

## 2019-01-01 RX ADMIN — SODIUM CHLORIDE 200 MG: 900 INJECTION, SOLUTION INTRAVENOUS at 21:15

## 2019-01-01 RX ADMIN — BISACODYL 10 MG: 10 SUPPOSITORY RECTAL at 15:23

## 2019-01-01 RX ADMIN — METOPROLOL TARTRATE 50 MG: 50 TABLET ORAL at 17:50

## 2019-01-01 RX ADMIN — INSULIN LISPRO 2 UNITS: 100 INJECTION, SOLUTION INTRAVENOUS; SUBCUTANEOUS at 00:48

## 2019-01-01 RX ADMIN — ABACAVIR SULFATE 300 MG: 20 SOLUTION ORAL at 12:16

## 2019-01-01 RX ADMIN — FENTANYL CITRATE 100 MCG: 50 INJECTION, SOLUTION INTRAMUSCULAR; INTRAVENOUS at 11:13

## 2019-01-01 RX ADMIN — MORPHINE SULFATE 1 MG: 2 INJECTION, SOLUTION INTRAMUSCULAR; INTRAVENOUS at 22:22

## 2019-01-01 RX ADMIN — ASPIRIN 81 MG 81 MG: 81 TABLET ORAL at 09:45

## 2019-01-01 RX ADMIN — METOPROLOL TARTRATE 25 MG: 25 TABLET, FILM COATED ORAL at 18:00

## 2019-01-01 RX ADMIN — HEPARIN SODIUM 5000 UNITS: 5000 INJECTION INTRAVENOUS; SUBCUTANEOUS at 06:36

## 2019-01-01 RX ADMIN — INSULIN LISPRO 2 UNITS: 100 INJECTION, SOLUTION INTRAVENOUS; SUBCUTANEOUS at 18:27

## 2019-01-01 RX ADMIN — INSULIN LISPRO 2 UNITS: 100 INJECTION, SOLUTION INTRAVENOUS; SUBCUTANEOUS at 12:48

## 2019-01-01 RX ADMIN — DOLUTEGRAVIR SODIUM 50 MG: 50 TABLET, FILM COATED ORAL at 10:15

## 2019-01-01 RX ADMIN — IODIXANOL 250 ML: 320 INJECTION, SOLUTION INTRAVASCULAR at 14:09

## 2019-01-01 RX ADMIN — MORPHINE SULFATE 2 MG: 2 INJECTION, SOLUTION INTRAMUSCULAR; INTRAVENOUS at 05:18

## 2019-01-01 RX ADMIN — CEFTRIAXONE SODIUM 2 G: 2 INJECTION, POWDER, FOR SOLUTION INTRAMUSCULAR; INTRAVENOUS at 19:52

## 2019-01-01 RX ADMIN — INSULIN LISPRO 2 UNITS: 100 INJECTION, SOLUTION INTRAVENOUS; SUBCUTANEOUS at 23:37

## 2019-01-01 RX ADMIN — CALCIUM GLUCONATE 1 G: 98 INJECTION, SOLUTION INTRAVENOUS at 07:28

## 2019-01-01 RX ADMIN — HEPARIN SODIUM 5000 UNITS: 5000 INJECTION INTRAVENOUS; SUBCUTANEOUS at 18:30

## 2019-01-01 RX ADMIN — NIMODIPINE 30 MG: 30 CAPSULE, LIQUID FILLED ORAL at 00:40

## 2019-01-01 RX ADMIN — POTASSIUM CHLORIDE 20 MEQ: 200 INJECTION, SOLUTION INTRAVENOUS at 06:07

## 2019-01-01 RX ADMIN — RITONAVIR 100 MG: 100 POWDER ORAL at 08:16

## 2019-01-01 RX ADMIN — ACETAMINOPHEN 650 MG: 325 TABLET, FILM COATED ORAL at 20:12

## 2019-01-01 RX ADMIN — NIMODIPINE 30 MG: 30 CAPSULE, LIQUID FILLED ORAL at 07:20

## 2019-01-01 RX ADMIN — NIMODIPINE 30 MG: 30 CAPSULE, LIQUID FILLED ORAL at 12:01

## 2019-01-01 RX ADMIN — VANCOMYCIN HYDROCHLORIDE 750 MG: 10 INJECTION, POWDER, LYOPHILIZED, FOR SOLUTION INTRAVENOUS at 05:03

## 2019-01-01 RX ADMIN — LORAZEPAM 1 MG: 2 INJECTION INTRAMUSCULAR; INTRAVENOUS at 07:34

## 2019-01-01 RX ADMIN — POTASSIUM CHLORIDE 20 MEQ: 200 INJECTION, SOLUTION INTRAVENOUS at 05:39

## 2019-01-01 RX ADMIN — CHLORHEXIDINE GLUCONATE 15 ML: 1.2 RINSE ORAL at 09:12

## 2019-01-01 RX ADMIN — SODIUM CHLORIDE 1000 ML: 900 INJECTION, SOLUTION INTRAVENOUS at 16:09

## 2019-01-01 RX ADMIN — LORAZEPAM 1 MG: 2 INJECTION INTRAMUSCULAR; INTRAVENOUS at 05:07

## 2019-01-01 RX ADMIN — VANCOMYCIN HYDROCHLORIDE 750 MG: 10 INJECTION, POWDER, LYOPHILIZED, FOR SOLUTION INTRAVENOUS at 05:38

## 2019-01-01 RX ADMIN — IBUPROFEN 600 MG: 600 TABLET ORAL at 05:27

## 2019-01-01 RX ADMIN — Medication 400 MG: at 18:06

## 2019-01-01 RX ADMIN — LORAZEPAM 1 MG: 2 INJECTION INTRAMUSCULAR; INTRAVENOUS at 05:33

## 2019-01-01 RX ADMIN — HEPARIN SODIUM 5000 UNITS: 5000 INJECTION INTRAVENOUS; SUBCUTANEOUS at 07:39

## 2019-01-01 RX ADMIN — Medication 400 MG: at 09:45

## 2019-01-01 RX ADMIN — VENLAFAXINE 50 MG: 25 TABLET ORAL at 13:29

## 2019-01-01 RX ADMIN — SODIUM CHLORIDE 50 ML/HR: 900 INJECTION, SOLUTION INTRAVENOUS at 04:00

## 2019-01-01 RX ADMIN — NIMODIPINE 30 MG: 30 CAPSULE, LIQUID FILLED ORAL at 09:16

## 2019-01-01 RX ADMIN — SODIUM CHLORIDE 1000 MG: 900 INJECTION, SOLUTION INTRAVENOUS at 20:09

## 2019-01-01 RX ADMIN — NIMODIPINE 30 MG: 30 CAPSULE, LIQUID FILLED ORAL at 04:55

## 2019-01-01 RX ADMIN — LANSOPRAZOLE 30 MG: 30 TABLET, ORALLY DISINTEGRATING ORAL at 08:33

## 2019-01-01 RX ADMIN — NIMODIPINE 30 MG: 30 CAPSULE, LIQUID FILLED ORAL at 15:23

## 2019-01-01 RX ADMIN — FENTANYL CITRATE 100 MCG: 50 INJECTION, SOLUTION INTRAMUSCULAR; INTRAVENOUS at 11:01

## 2019-01-01 RX ADMIN — IBUPROFEN 600 MG: 600 TABLET ORAL at 09:40

## 2019-01-01 RX ADMIN — LORAZEPAM 1 MG: 2 INJECTION INTRAMUSCULAR; INTRAVENOUS at 23:22

## 2019-01-01 RX ADMIN — INSULIN LISPRO 4 UNITS: 100 INJECTION, SOLUTION INTRAVENOUS; SUBCUTANEOUS at 23:38

## 2019-01-01 RX ADMIN — LORAZEPAM 1 MG: 2 INJECTION INTRAMUSCULAR; INTRAVENOUS at 10:56

## 2019-01-01 RX ADMIN — SODIUM CHLORIDE 250 ML: 3 INJECTION, SOLUTION INTRAVENOUS at 22:23

## 2019-01-01 RX ADMIN — NIMODIPINE 30 MG: 30 CAPSULE, LIQUID FILLED ORAL at 11:20

## 2019-01-01 RX ADMIN — MIDAZOLAM HYDROCHLORIDE 4 MG: 1 INJECTION, SOLUTION INTRAMUSCULAR; INTRAVENOUS at 11:38

## 2019-01-01 RX ADMIN — SENNOSIDES AND DOCUSATE SODIUM 2 TABLET: 8.6; 5 TABLET ORAL at 22:02

## 2019-01-01 RX ADMIN — NIMODIPINE 30 MG: 30 CAPSULE, LIQUID FILLED ORAL at 11:35

## 2019-01-01 RX ADMIN — LORAZEPAM 1 MG: 2 INJECTION INTRAMUSCULAR; INTRAVENOUS at 17:39

## 2019-01-01 RX ADMIN — FENTANYL CITRATE 50 MCG: 50 INJECTION INTRAMUSCULAR; INTRAVENOUS at 19:45

## 2019-01-01 RX ADMIN — METOPROLOL TARTRATE 25 MG: 25 TABLET, FILM COATED ORAL at 09:45

## 2019-01-01 RX ADMIN — ABACAVIR SULFATE 300 MG: 20 SOLUTION ORAL at 20:29

## 2019-01-01 RX ADMIN — SENNOSIDES AND DOCUSATE SODIUM 2 TABLET: 8.6; 5 TABLET ORAL at 00:07

## 2019-01-01 RX ADMIN — SODIUM CHLORIDE 250 ML: 3 INJECTION, SOLUTION INTRAVENOUS at 10:33

## 2019-01-01 RX ADMIN — ABACAVIR SULFATE 300 MG: 20 SOLUTION ORAL at 20:09

## 2019-01-01 RX ADMIN — MORPHINE SULFATE 4 MG: 4 INJECTION INTRAVENOUS at 19:51

## 2019-01-01 RX ADMIN — NIMODIPINE 30 MG: 30 CAPSULE, LIQUID FILLED ORAL at 20:09

## 2019-01-01 RX ADMIN — SODIUM CHLORIDE 2.5 MG/HR: 900 INJECTION, SOLUTION INTRAVENOUS at 13:57

## 2019-01-01 RX ADMIN — NIMODIPINE 30 MG: 30 CAPSULE, LIQUID FILLED ORAL at 07:59

## 2019-01-01 RX ADMIN — NIMODIPINE 30 MG: 30 CAPSULE, LIQUID FILLED ORAL at 16:19

## 2019-01-01 RX ADMIN — FENTANYL CITRATE 50 MCG: 50 INJECTION INTRAMUSCULAR; INTRAVENOUS at 10:07

## 2019-01-01 RX ADMIN — SODIUM CHLORIDE 5 MG/HR: 900 INJECTION, SOLUTION INTRAVENOUS at 23:58

## 2019-01-01 RX ADMIN — VENLAFAXINE 50 MG: 25 TABLET ORAL at 11:35

## 2019-01-01 RX ADMIN — SODIUM CHLORIDE 5 MG/HR: 900 INJECTION, SOLUTION INTRAVENOUS at 11:17

## 2019-01-01 RX ADMIN — VENLAFAXINE 50 MG: 25 TABLET ORAL at 07:46

## 2019-01-01 RX ADMIN — NIMODIPINE 30 MG: 30 CAPSULE, LIQUID FILLED ORAL at 15:38

## 2019-01-01 RX ADMIN — ACETAMINOPHEN 650 MG: 325 TABLET, FILM COATED ORAL at 20:47

## 2019-01-01 RX ADMIN — MORPHINE SULFATE 2 MG: 2 INJECTION, SOLUTION INTRAMUSCULAR; INTRAVENOUS at 10:56

## 2019-01-01 RX ADMIN — INSULIN LISPRO 2 UNITS: 100 INJECTION, SOLUTION INTRAVENOUS; SUBCUTANEOUS at 23:40

## 2019-01-01 RX ADMIN — LORAZEPAM 1 MG: 2 INJECTION INTRAMUSCULAR; INTRAVENOUS at 06:49

## 2019-01-01 RX ADMIN — FENTANYL CITRATE 100 MCG: 50 INJECTION, SOLUTION INTRAMUSCULAR; INTRAVENOUS at 21:12

## 2019-01-01 RX ADMIN — VANCOMYCIN HYDROCHLORIDE 1500 MG: 10 INJECTION, POWDER, LYOPHILIZED, FOR SOLUTION INTRAVENOUS at 01:53

## 2019-01-01 RX ADMIN — PROPOFOL 10 MCG/KG/MIN: 10 INJECTION, EMULSION INTRAVENOUS at 05:50

## 2019-01-01 RX ADMIN — SODIUM CHLORIDE 5 MG/HR: 900 INJECTION, SOLUTION INTRAVENOUS at 19:40

## 2019-01-01 RX ADMIN — INSULIN LISPRO 2 UNITS: 100 INJECTION, SOLUTION INTRAVENOUS; SUBCUTANEOUS at 18:18

## 2019-01-01 RX ADMIN — SODIUM CHLORIDE 5 MG/HR: 900 INJECTION, SOLUTION INTRAVENOUS at 06:30

## 2019-01-01 RX ADMIN — ASPIRIN 325 MG: 325 TABLET ORAL at 12:35

## 2019-01-01 RX ADMIN — ASPIRIN 81 MG 81 MG: 81 TABLET ORAL at 09:03

## 2019-01-01 RX ADMIN — VENLAFAXINE 50 MG: 25 TABLET ORAL at 11:20

## 2019-01-01 RX ADMIN — SODIUM CHLORIDE 5 MG/HR: 900 INJECTION, SOLUTION INTRAVENOUS at 12:15

## 2019-01-01 RX ADMIN — HEPARIN SODIUM 2000 UNITS: 5000 INJECTION, SOLUTION INTRAVENOUS; SUBCUTANEOUS at 11:03

## 2019-01-01 RX ADMIN — VENLAFAXINE 50 MG: 25 TABLET ORAL at 17:36

## 2019-01-01 RX ADMIN — IBUPROFEN 600 MG: 600 TABLET ORAL at 00:06

## 2019-01-01 RX ADMIN — VENLAFAXINE 50 MG: 25 TABLET ORAL at 12:18

## 2019-01-01 RX ADMIN — HEPARIN SODIUM 5000 UNITS: 5000 INJECTION INTRAVENOUS; SUBCUTANEOUS at 19:20

## 2019-01-01 RX ADMIN — SODIUM CHLORIDE 125 ML/HR: 450 INJECTION, SOLUTION INTRAVENOUS at 17:27

## 2019-01-01 RX ADMIN — Medication 400 MG: at 08:14

## 2019-01-01 RX ADMIN — CHLORHEXIDINE GLUCONATE 15 ML: 1.2 RINSE ORAL at 21:18

## 2019-01-01 RX ADMIN — NIMODIPINE 30 MG: 30 CAPSULE, LIQUID FILLED ORAL at 03:32

## 2019-01-01 RX ADMIN — MAGNESIUM SULFATE IN WATER 4 G: 40 INJECTION, SOLUTION INTRAVENOUS at 00:34

## 2019-01-01 RX ADMIN — HEPARIN SODIUM 2000 UNITS: 5000 INJECTION, SOLUTION INTRAVENOUS; SUBCUTANEOUS at 11:04

## 2019-01-01 RX ADMIN — FENTANYL CITRATE 50 MCG: 50 INJECTION INTRAMUSCULAR; INTRAVENOUS at 09:09

## 2019-01-01 RX ADMIN — SODIUM CHLORIDE 100 ML: 900 INJECTION, SOLUTION INTRAVENOUS at 20:51

## 2019-01-01 RX ADMIN — RITONAVIR 100 MG: 100 POWDER ORAL at 06:16

## 2019-01-01 RX ADMIN — HEPARIN SODIUM 5000 UNITS: 5000 INJECTION INTRAVENOUS; SUBCUTANEOUS at 06:25

## 2019-01-01 RX ADMIN — INSULIN LISPRO 2 UNITS: 100 INJECTION, SOLUTION INTRAVENOUS; SUBCUTANEOUS at 17:49

## 2019-01-01 RX ADMIN — SODIUM CHLORIDE 250 ML: 3 INJECTION, SOLUTION INTRAVENOUS at 09:58

## 2019-01-01 RX ADMIN — NIMODIPINE 30 MG: 30 CAPSULE, LIQUID FILLED ORAL at 20:08

## 2019-01-01 RX ADMIN — Medication 400 MG: at 17:36

## 2019-01-01 RX ADMIN — HEPARIN SODIUM 5000 UNITS: 5000 INJECTION INTRAVENOUS; SUBCUTANEOUS at 19:50

## 2019-01-01 RX ADMIN — IOPAMIDOL 110 ML: 755 INJECTION, SOLUTION INTRAVENOUS at 08:37

## 2019-01-01 RX ADMIN — SODIUM CHLORIDE 1 G: 900 INJECTION, SOLUTION INTRAVENOUS at 00:35

## 2019-01-01 RX ADMIN — SODIUM CHLORIDE 50 ML/HR: 900 INJECTION, SOLUTION INTRAVENOUS at 14:51

## 2019-01-01 RX ADMIN — Medication 400 MG: at 09:03

## 2019-01-01 RX ADMIN — IBUPROFEN 600 MG: 600 TABLET ORAL at 04:26

## 2019-01-01 RX ADMIN — INSULIN LISPRO 2 UNITS: 100 INJECTION, SOLUTION INTRAVENOUS; SUBCUTANEOUS at 17:26

## 2019-01-01 RX ADMIN — ACETAMINOPHEN 650 MG: 325 TABLET, FILM COATED ORAL at 22:51

## 2019-01-01 RX ADMIN — LORAZEPAM 1 MG: 2 INJECTION INTRAMUSCULAR; INTRAVENOUS at 18:02

## 2019-01-01 RX ADMIN — MORPHINE SULFATE 2 MG: 2 INJECTION, SOLUTION INTRAMUSCULAR; INTRAVENOUS at 01:18

## 2019-01-01 RX ADMIN — INSULIN LISPRO 2 UNITS: 100 INJECTION, SOLUTION INTRAVENOUS; SUBCUTANEOUS at 11:38

## 2019-01-01 RX ADMIN — ACETAMINOPHEN 650 MG: 325 TABLET, FILM COATED ORAL at 07:38

## 2019-01-01 RX ADMIN — NIMODIPINE 30 MG: 30 CAPSULE, LIQUID FILLED ORAL at 23:39

## 2019-01-01 RX ADMIN — HEPARIN SODIUM 5000 UNITS: 5000 INJECTION INTRAVENOUS; SUBCUTANEOUS at 07:52

## 2019-01-01 RX ADMIN — FENTANYL CITRATE 100 MCG: 50 INJECTION, SOLUTION INTRAMUSCULAR; INTRAVENOUS at 12:06

## 2019-01-01 RX ADMIN — IOPAMIDOL 80 ML: 755 INJECTION, SOLUTION INTRAVENOUS at 20:51

## 2019-01-01 RX ADMIN — INSULIN LISPRO 2 UNITS: 100 INJECTION, SOLUTION INTRAVENOUS; SUBCUTANEOUS at 05:35

## 2019-01-01 RX ADMIN — ACETAMINOPHEN 650 MG: 325 TABLET, FILM COATED ORAL at 21:26

## 2019-01-01 RX ADMIN — FUROSEMIDE 40 MG: 10 INJECTION INTRAMUSCULAR; INTRAVENOUS at 16:18

## 2019-01-01 RX ADMIN — DOLUTEGRAVIR SODIUM 50 MG: 50 TABLET, FILM COATED ORAL at 12:14

## 2019-01-01 RX ADMIN — NICARDIPINE HYDROCHLORIDE 2.5 MG/HR: 0.1 INJECTION, SOLUTION INTRAVENOUS at 00:04

## 2019-01-01 RX ADMIN — MORPHINE SULFATE 2 MG: 2 INJECTION, SOLUTION INTRAMUSCULAR; INTRAVENOUS at 17:23

## 2019-01-01 RX ADMIN — NIMODIPINE 60 MG: 30 CAPSULE ORAL at 00:07

## 2019-01-01 RX ADMIN — MORPHINE SULFATE 2 MG: 2 INJECTION, SOLUTION INTRAMUSCULAR; INTRAVENOUS at 08:20

## 2019-01-01 RX ADMIN — ACETAMINOPHEN 650 MG: 325 TABLET, FILM COATED ORAL at 19:21

## 2019-01-01 RX ADMIN — SODIUM CHLORIDE 1000 ML: 900 INJECTION, SOLUTION INTRAVENOUS at 21:47

## 2019-01-01 RX ADMIN — SODIUM CHLORIDE 200 MG: 900 INJECTION, SOLUTION INTRAVENOUS at 11:25

## 2019-01-01 RX ADMIN — VENLAFAXINE 50 MG: 25 TABLET ORAL at 18:06

## 2019-01-01 RX ADMIN — LORAZEPAM 1 MG: 2 INJECTION INTRAMUSCULAR; INTRAVENOUS at 02:23

## 2019-01-01 RX ADMIN — HEPARIN SODIUM 2000 UNITS: 5000 INJECTION, SOLUTION INTRAVENOUS; SUBCUTANEOUS at 11:02

## 2019-01-01 RX ADMIN — SODIUM CHLORIDE 250 ML: 3 INJECTION, SOLUTION INTRAVENOUS at 04:56

## 2019-01-01 RX ADMIN — MORPHINE SULFATE 4 MG: 4 INJECTION INTRAVENOUS at 21:41

## 2019-01-01 RX ADMIN — SODIUM CHLORIDE 200 MG: 900 INJECTION, SOLUTION INTRAVENOUS at 22:39

## 2019-01-01 RX ADMIN — HEPARIN SODIUM 2000 UNITS: 5000 INJECTION, SOLUTION INTRAVENOUS; SUBCUTANEOUS at 11:53

## 2019-01-01 RX ADMIN — SODIUM CHLORIDE 125 ML/HR: 450 INJECTION, SOLUTION INTRAVENOUS at 18:22

## 2019-01-01 RX ADMIN — METOPROLOL TARTRATE 25 MG: 25 TABLET, FILM COATED ORAL at 13:03

## 2019-01-01 RX ADMIN — IOPAMIDOL 110 ML: 755 INJECTION, SOLUTION INTRAVENOUS at 16:16

## 2019-01-01 RX ADMIN — Medication 10 ML: at 08:37

## 2019-01-01 RX ADMIN — HEPARIN SODIUM 5000 UNITS: 5000 INJECTION INTRAVENOUS; SUBCUTANEOUS at 07:46

## 2019-01-01 RX ADMIN — VANCOMYCIN HYDROCHLORIDE 750 MG: 10 INJECTION, POWDER, LYOPHILIZED, FOR SOLUTION INTRAVENOUS at 05:51

## 2019-01-01 RX ADMIN — INSULIN LISPRO 4 UNITS: 100 INJECTION, SOLUTION INTRAVENOUS; SUBCUTANEOUS at 05:16

## 2019-01-01 RX ADMIN — NIMODIPINE 30 MG: 30 CAPSULE, LIQUID FILLED ORAL at 03:43

## 2019-01-01 RX ADMIN — LANSOPRAZOLE 30 MG: 30 TABLET, ORALLY DISINTEGRATING ORAL at 08:14

## 2019-01-01 RX ADMIN — HEPARIN SODIUM 5000 UNITS: 5000 INJECTION INTRAVENOUS; SUBCUTANEOUS at 06:16

## 2019-01-01 RX ADMIN — ABACAVIR SULFATE 300 MG: 20 SOLUTION ORAL at 22:00

## 2019-01-01 RX ADMIN — Medication 30 MCG/MIN: at 11:12

## 2019-01-01 RX ADMIN — LORAZEPAM 1 MG: 2 INJECTION INTRAMUSCULAR; INTRAVENOUS at 11:37

## 2019-01-01 RX ADMIN — SODIUM CHLORIDE 50 ML/HR: 900 INJECTION, SOLUTION INTRAVENOUS at 13:07

## 2019-01-01 RX ADMIN — ACETAMINOPHEN 650 MG: 325 TABLET, FILM COATED ORAL at 21:39

## 2019-01-01 RX ADMIN — FENTANYL CITRATE 100 MCG: 50 INJECTION, SOLUTION INTRAMUSCULAR; INTRAVENOUS at 13:03

## 2019-01-01 RX ADMIN — SODIUM CHLORIDE 50 ML/HR: 900 INJECTION, SOLUTION INTRAVENOUS at 11:39

## 2019-01-01 RX ADMIN — HEPARIN SODIUM 2000 UNITS: 5000 INJECTION, SOLUTION INTRAVENOUS; SUBCUTANEOUS at 12:11

## 2019-01-01 RX ADMIN — FENTANYL CITRATE 100 MCG: 50 INJECTION INTRAMUSCULAR; INTRAVENOUS at 21:12

## 2019-01-01 RX ADMIN — SODIUM CHLORIDE 200 MG: 900 INJECTION, SOLUTION INTRAVENOUS at 08:24

## 2019-01-01 RX ADMIN — NIMODIPINE 30 MG: 30 CAPSULE, LIQUID FILLED ORAL at 19:46

## 2019-01-01 RX ADMIN — INSULIN LISPRO 4 UNITS: 100 INJECTION, SOLUTION INTRAVENOUS; SUBCUTANEOUS at 11:25

## 2019-01-01 RX ADMIN — SODIUM CHLORIDE 5 MG/HR: 900 INJECTION, SOLUTION INTRAVENOUS at 21:27

## 2019-01-01 RX ADMIN — LANSOPRAZOLE 30 MG: 30 TABLET, ORALLY DISINTEGRATING ORAL at 07:53

## 2019-01-01 RX ADMIN — ACETAMINOPHEN 650 MG: 325 TABLET, FILM COATED ORAL at 13:17

## 2019-01-01 RX ADMIN — MORPHINE SULFATE 2 MG: 2 INJECTION, SOLUTION INTRAMUSCULAR; INTRAVENOUS at 18:33

## 2019-01-01 RX ADMIN — SODIUM CHLORIDE 50 ML/HR: 900 INJECTION, SOLUTION INTRAVENOUS at 09:45

## 2019-01-01 RX ADMIN — MORPHINE SULFATE 2 MG: 2 INJECTION, SOLUTION INTRAMUSCULAR; INTRAVENOUS at 16:18

## 2019-01-01 RX ADMIN — SENNOSIDES AND DOCUSATE SODIUM 2 TABLET: 8.6; 5 TABLET ORAL at 21:39

## 2019-01-01 RX ADMIN — NIMODIPINE 30 MG: 30 CAPSULE, LIQUID FILLED ORAL at 20:29

## 2019-01-01 RX ADMIN — ALTEPLASE 1 MG: KIT at 06:03

## 2019-01-01 RX ADMIN — METOPROLOL TARTRATE 50 MG: 50 TABLET ORAL at 08:14

## 2019-01-01 RX ADMIN — VENLAFAXINE 50 MG: 25 TABLET ORAL at 09:51

## 2019-01-01 RX ADMIN — HEPARIN SODIUM 5000 UNITS: 5000 INJECTION INTRAVENOUS; SUBCUTANEOUS at 08:34

## 2019-01-01 RX ADMIN — HEPARIN SODIUM 5000 UNITS: 5000 INJECTION INTRAVENOUS; SUBCUTANEOUS at 22:11

## 2019-01-01 RX ADMIN — RITONAVIR 100 MG: 100 POWDER ORAL at 12:14

## 2019-01-01 RX ADMIN — Medication 10 ML: at 16:16

## 2019-01-01 RX ADMIN — DOLUTEGRAVIR SODIUM 50 MG: 50 TABLET, FILM COATED ORAL at 08:15

## 2019-01-01 RX ADMIN — DARUNAVIR 800 MG: 800 TABLET, FILM COATED ORAL at 08:15

## 2019-01-01 RX ADMIN — NIMODIPINE 30 MG: 30 CAPSULE, LIQUID FILLED ORAL at 08:42

## 2019-01-01 RX ADMIN — SODIUM CHLORIDE 250 ML: 3 INJECTION, SOLUTION INTRAVENOUS at 05:38

## 2019-01-01 RX ADMIN — LORAZEPAM 1 MG: 2 INJECTION INTRAMUSCULAR; INTRAVENOUS at 14:38

## 2019-01-01 RX ADMIN — ABACAVIR SULFATE 300 MG: 20 SOLUTION ORAL at 09:00

## 2019-01-01 RX ADMIN — NIMODIPINE 30 MG: 30 CAPSULE, LIQUID FILLED ORAL at 11:16

## 2019-01-01 RX ADMIN — NIMODIPINE 30 MG: 30 CAPSULE, LIQUID FILLED ORAL at 23:37

## 2019-01-01 RX ADMIN — VENLAFAXINE 50 MG: 25 TABLET ORAL at 17:16

## 2019-01-01 RX ADMIN — VENLAFAXINE 50 MG: 25 TABLET ORAL at 16:18

## 2019-01-01 RX ADMIN — Medication 400 MG: at 18:00

## 2019-01-01 RX ADMIN — LORAZEPAM 1 MG: 2 INJECTION INTRAMUSCULAR; INTRAVENOUS at 19:48

## 2019-01-01 RX ADMIN — NIMODIPINE 30 MG: 30 CAPSULE, LIQUID FILLED ORAL at 23:21

## 2019-01-01 RX ADMIN — NIMODIPINE 30 MG: 30 CAPSULE, LIQUID FILLED ORAL at 16:17

## 2019-01-01 RX ADMIN — SODIUM CHLORIDE 1000 ML: 900 INJECTION, SOLUTION INTRAVENOUS at 12:53

## 2019-01-01 RX ADMIN — HEPARIN SODIUM 5000 UNITS: 5000 INJECTION INTRAVENOUS; SUBCUTANEOUS at 19:18

## 2019-01-01 RX ADMIN — ACETAMINOPHEN 650 MG: 325 TABLET, FILM COATED ORAL at 00:07

## 2019-01-01 RX ADMIN — LANSOPRAZOLE 30 MG: 30 TABLET, ORALLY DISINTEGRATING ORAL at 07:39

## 2019-01-01 RX ADMIN — SODIUM CHLORIDE 1000 MG: 900 INJECTION, SOLUTION INTRAVENOUS at 09:15

## 2019-01-01 RX ADMIN — SODIUM CHLORIDE 100 ML: 900 INJECTION, SOLUTION INTRAVENOUS at 08:37

## 2019-01-01 RX ADMIN — CHLORHEXIDINE GLUCONATE 15 ML: 1.2 RINSE ORAL at 08:16

## 2019-01-01 RX ADMIN — ASPIRIN 81 MG 81 MG: 81 TABLET ORAL at 08:14

## 2019-01-01 RX ADMIN — INSULIN LISPRO 2 UNITS: 100 INJECTION, SOLUTION INTRAVENOUS; SUBCUTANEOUS at 05:51

## 2019-01-01 RX ADMIN — INSULIN LISPRO 2 UNITS: 100 INJECTION, SOLUTION INTRAVENOUS; SUBCUTANEOUS at 18:00

## 2019-01-01 RX ADMIN — NIMODIPINE 30 MG: 30 CAPSULE, LIQUID FILLED ORAL at 20:13

## 2019-01-01 RX ADMIN — SODIUM CHLORIDE 2000 ML: 900 INJECTION, SOLUTION INTRAVENOUS at 19:57

## 2019-01-01 RX ADMIN — Medication 400 MG: at 17:01

## 2019-01-01 RX ADMIN — METOPROLOL TARTRATE 50 MG: 50 TABLET ORAL at 17:36

## 2019-01-01 RX ADMIN — MORPHINE SULFATE 1 MG: 2 INJECTION, SOLUTION INTRAMUSCULAR; INTRAVENOUS at 19:05

## 2019-01-01 RX ADMIN — SODIUM CHLORIDE 5 MG/HR: 900 INJECTION, SOLUTION INTRAVENOUS at 17:02

## 2019-01-01 RX ADMIN — SODIUM CHLORIDE 5 MG/HR: 900 INJECTION, SOLUTION INTRAVENOUS at 08:14

## 2019-01-01 RX ADMIN — LANSOPRAZOLE 30 MG: 30 TABLET, ORALLY DISINTEGRATING ORAL at 06:30

## 2019-01-01 RX ADMIN — ASPIRIN 81 MG 81 MG: 81 TABLET ORAL at 09:09

## 2019-01-01 RX ADMIN — METHYLPHENIDATE HYDROCHLORIDE 5 MG: 5 TABLET ORAL at 07:59

## 2019-01-01 RX ADMIN — SODIUM CHLORIDE 250 ML: 3 INJECTION, SOLUTION INTRAVENOUS at 16:22

## 2019-01-01 RX ADMIN — SODIUM CHLORIDE 1 G: 900 INJECTION, SOLUTION INTRAVENOUS at 00:01

## 2019-01-01 RX ADMIN — POTASSIUM CHLORIDE 20 MEQ: 200 INJECTION, SOLUTION INTRAVENOUS at 05:13

## 2019-01-01 RX ADMIN — VENLAFAXINE 50 MG: 25 TABLET ORAL at 18:15

## 2019-01-01 RX ADMIN — NIMODIPINE 30 MG: 30 CAPSULE, LIQUID FILLED ORAL at 07:40

## 2019-01-01 RX ADMIN — METOPROLOL TARTRATE 5 MG: 5 INJECTION INTRAVENOUS at 01:21

## 2019-01-01 RX ADMIN — INSULIN LISPRO 2 UNITS: 100 INJECTION, SOLUTION INTRAVENOUS; SUBCUTANEOUS at 23:28

## 2019-01-01 RX ADMIN — SODIUM CHLORIDE 200 MG: 900 INJECTION, SOLUTION INTRAVENOUS at 09:13

## 2019-01-01 RX ADMIN — NIMODIPINE 30 MG: 30 CAPSULE, LIQUID FILLED ORAL at 16:08

## 2019-01-01 RX ADMIN — NIMODIPINE 30 MG: 30 CAPSULE, LIQUID FILLED ORAL at 03:33

## 2019-01-01 RX ADMIN — HEPARIN SODIUM 5000 UNITS: 5000 INJECTION INTRAVENOUS; SUBCUTANEOUS at 19:55

## 2019-01-01 RX ADMIN — INSULIN LISPRO 2 UNITS: 100 INJECTION, SOLUTION INTRAVENOUS; SUBCUTANEOUS at 17:36

## 2019-01-01 RX ADMIN — VENLAFAXINE 50 MG: 25 TABLET ORAL at 09:03

## 2019-01-01 RX ADMIN — NIMODIPINE 30 MG: 30 CAPSULE, LIQUID FILLED ORAL at 09:04

## 2019-01-01 RX ADMIN — Medication 400 MG: at 08:33

## 2019-01-01 RX ADMIN — CHLORHEXIDINE GLUCONATE 15 ML: 1.2 RINSE ORAL at 20:29

## 2019-01-01 RX ADMIN — INSULIN LISPRO 2 UNITS: 100 INJECTION, SOLUTION INTRAVENOUS; SUBCUTANEOUS at 00:23

## 2019-01-01 RX ADMIN — NIMODIPINE 30 MG: 30 CAPSULE, LIQUID FILLED ORAL at 03:15

## 2019-01-01 RX ADMIN — Medication 400 MG: at 09:09

## 2019-01-01 RX ADMIN — SODIUM CHLORIDE 75 ML/HR: 900 INJECTION, SOLUTION INTRAVENOUS at 23:15

## 2019-01-01 RX ADMIN — LANSOPRAZOLE 30 MG: 30 TABLET, ORALLY DISINTEGRATING ORAL at 07:38

## 2019-01-01 RX ADMIN — LORAZEPAM 1 MG: 2 INJECTION INTRAMUSCULAR; INTRAVENOUS at 09:17

## 2019-01-01 RX ADMIN — VENLAFAXINE 50 MG: 25 TABLET ORAL at 08:14

## 2019-01-01 RX ADMIN — ASPIRIN 81 MG 81 MG: 81 TABLET ORAL at 09:11

## 2019-01-01 RX ADMIN — HEPARIN SODIUM 5000 UNITS: 5000 INJECTION INTRAVENOUS; SUBCUTANEOUS at 19:44

## 2019-01-01 RX ADMIN — INSULIN LISPRO 2 UNITS: 100 INJECTION, SOLUTION INTRAVENOUS; SUBCUTANEOUS at 23:32

## 2019-01-01 RX ADMIN — SODIUM CHLORIDE 5 MG/HR: 900 INJECTION, SOLUTION INTRAVENOUS at 22:04

## 2019-01-01 RX ADMIN — FENTANYL CITRATE 100 MCG: 50 INJECTION, SOLUTION INTRAMUSCULAR; INTRAVENOUS at 11:56

## 2019-01-01 RX ADMIN — SODIUM CHLORIDE 125 ML/HR: 450 INJECTION, SOLUTION INTRAVENOUS at 02:34

## 2019-01-01 RX ADMIN — SODIUM CHLORIDE 1 G: 900 INJECTION, SOLUTION INTRAVENOUS at 00:02

## 2019-01-01 RX ADMIN — SUCCINYLCHOLINE CHLORIDE 100 MG: 20 INJECTION, SOLUTION INTRAMUSCULAR; INTRAVENOUS at 19:33

## 2019-01-01 RX ADMIN — INSULIN LISPRO 2 UNITS: 100 INJECTION, SOLUTION INTRAVENOUS; SUBCUTANEOUS at 05:55

## 2019-01-01 RX ADMIN — VENLAFAXINE 50 MG: 25 TABLET ORAL at 08:34

## 2019-01-01 RX ADMIN — VANCOMYCIN HYDROCHLORIDE 750 MG: 10 INJECTION, POWDER, LYOPHILIZED, FOR SOLUTION INTRAVENOUS at 05:06

## 2019-01-01 RX ADMIN — SODIUM CHLORIDE 1000 ML: 900 INJECTION, SOLUTION INTRAVENOUS at 15:20

## 2019-01-01 RX ADMIN — SENNOSIDES AND DOCUSATE SODIUM 2 TABLET: 8.6; 5 TABLET ORAL at 21:06

## 2019-01-01 RX ADMIN — ACETAMINOPHEN 650 MG: 325 TABLET, FILM COATED ORAL at 14:56

## 2019-01-01 RX ADMIN — NIMODIPINE 30 MG: 30 CAPSULE, LIQUID FILLED ORAL at 19:22

## 2019-01-01 RX ADMIN — INSULIN LISPRO 2 UNITS: 100 INJECTION, SOLUTION INTRAVENOUS; SUBCUTANEOUS at 12:00

## 2019-01-01 RX ADMIN — ACETAMINOPHEN 650 MG: 325 TABLET, FILM COATED ORAL at 01:04

## 2019-01-01 RX ADMIN — PROPOFOL 100 MCG/KG/MIN: 10 INJECTION, EMULSION INTRAVENOUS at 12:55

## 2019-01-01 RX ADMIN — ACETAMINOPHEN 650 MG: 325 TABLET, FILM COATED ORAL at 04:09

## 2019-01-01 RX ADMIN — LANSOPRAZOLE 30 MG: 30 TABLET, ORALLY DISINTEGRATING ORAL at 07:58

## 2019-01-01 RX ADMIN — ACETAMINOPHEN 650 MG: 325 TABLET, FILM COATED ORAL at 05:54

## 2019-01-01 RX ADMIN — PANTOPRAZOLE SODIUM 40 MG: 40 TABLET, DELAYED RELEASE ORAL at 09:17

## 2019-01-01 RX ADMIN — MORPHINE SULFATE 2 MG: 2 INJECTION, SOLUTION INTRAMUSCULAR; INTRAVENOUS at 02:57

## 2019-01-01 RX ADMIN — SODIUM CHLORIDE 125 ML/HR: 450 INJECTION, SOLUTION INTRAVENOUS at 10:57

## 2019-01-01 RX ADMIN — SODIUM CHLORIDE 200 MG: 900 INJECTION, SOLUTION INTRAVENOUS at 22:17

## 2019-01-01 RX ADMIN — SODIUM CHLORIDE 1000 MG: 900 INJECTION, SOLUTION INTRAVENOUS at 09:05

## 2019-01-01 RX ADMIN — SODIUM CHLORIDE 5 MG/HR: 900 INJECTION, SOLUTION INTRAVENOUS at 03:30

## 2019-01-01 RX ADMIN — HEPARIN SODIUM 5000 UNITS: 5000 INJECTION INTRAVENOUS; SUBCUTANEOUS at 08:14

## 2019-01-01 RX ADMIN — MIDAZOLAM HYDROCHLORIDE 4 MG: 1 INJECTION, SOLUTION INTRAMUSCULAR; INTRAVENOUS at 11:14

## 2019-01-01 RX ADMIN — VENLAFAXINE 50 MG: 25 TABLET ORAL at 16:59

## 2019-01-01 RX ADMIN — SODIUM CHLORIDE 1 G: 900 INJECTION, SOLUTION INTRAVENOUS at 02:13

## 2019-01-01 RX ADMIN — Medication 400 MG: at 09:11

## 2019-01-01 RX ADMIN — MIDAZOLAM HYDROCHLORIDE 4 MG: 1 INJECTION, SOLUTION INTRAMUSCULAR; INTRAVENOUS at 11:54

## 2019-01-01 RX ADMIN — SODIUM CHLORIDE 1 G: 900 INJECTION, SOLUTION INTRAVENOUS at 00:00

## 2019-01-01 RX ADMIN — NIMODIPINE 30 MG: 30 CAPSULE, LIQUID FILLED ORAL at 03:11

## 2019-01-01 RX ADMIN — SUCCINYLCHOLINE CHLORIDE 100 MG: 20 INJECTION INTRAMUSCULAR; INTRAVENOUS at 19:33

## 2019-01-01 RX ADMIN — SODIUM CHLORIDE 100 ML: 900 INJECTION, SOLUTION INTRAVENOUS at 16:17

## 2019-01-01 RX ADMIN — LORAZEPAM 2 MG: 2 INJECTION INTRAMUSCULAR; INTRAVENOUS at 00:20

## 2019-01-01 RX ADMIN — ACETAMINOPHEN 650 MG: 325 TABLET, FILM COATED ORAL at 23:21

## 2019-01-01 RX ADMIN — ACETAMINOPHEN 650 MG: 325 TABLET, FILM COATED ORAL at 03:48

## 2019-01-01 RX ADMIN — LANSOPRAZOLE 30 MG: 30 TABLET, ORALLY DISINTEGRATING ORAL at 07:46

## 2019-01-01 RX ADMIN — POTASSIUM CHLORIDE 20 MEQ: 200 INJECTION, SOLUTION INTRAVENOUS at 22:02

## 2019-01-01 RX ADMIN — CHLORHEXIDINE GLUCONATE 15 ML: 1.2 RINSE ORAL at 09:39

## 2019-01-01 RX ADMIN — ABACAVIR SULFATE 300 MG: 20 SOLUTION ORAL at 08:16

## 2019-01-01 RX ADMIN — SODIUM CHLORIDE 1500 MG: 900 INJECTION, SOLUTION INTRAVENOUS at 12:20

## 2019-01-01 RX ADMIN — HEPARIN SODIUM 5000 UNITS: 5000 INJECTION INTRAVENOUS; SUBCUTANEOUS at 07:37

## 2019-01-01 RX ADMIN — Medication 10 ML: at 20:51

## 2019-01-01 RX ADMIN — Medication 400 MG: at 17:50

## 2019-01-01 RX ADMIN — NIMODIPINE 30 MG: 30 CAPSULE, LIQUID FILLED ORAL at 12:14

## 2019-01-01 RX ADMIN — SODIUM CHLORIDE 250 ML: 3 INJECTION, SOLUTION INTRAVENOUS at 16:04

## 2019-01-01 RX ADMIN — ACETAMINOPHEN 650 MG: 325 TABLET, FILM COATED ORAL at 13:23

## 2019-01-01 RX ADMIN — ACETAMINOPHEN 650 MG: 325 TABLET, FILM COATED ORAL at 16:08

## 2019-01-01 RX ADMIN — SODIUM CHLORIDE 2.5 MG/HR: 900 INJECTION, SOLUTION INTRAVENOUS at 04:31

## 2019-01-01 RX ADMIN — NIMODIPINE 30 MG: 30 CAPSULE, LIQUID FILLED ORAL at 00:24

## 2019-01-01 RX ADMIN — ETOMIDATE 10 MG: 2 INJECTION INTRAVENOUS at 19:31

## 2019-01-01 RX ADMIN — MORPHINE SULFATE 1 MG: 2 INJECTION, SOLUTION INTRAMUSCULAR; INTRAVENOUS at 21:01

## 2019-01-01 RX ADMIN — HEPARIN SODIUM 5000 UNITS: 5000 INJECTION INTRAVENOUS; SUBCUTANEOUS at 19:46

## 2019-01-01 RX ADMIN — SODIUM BICARBONATE 100 MEQ: 84 INJECTION, SOLUTION INTRAVENOUS at 11:40

## 2019-01-01 RX ADMIN — MORPHINE SULFATE 2 MG: 2 INJECTION, SOLUTION INTRAMUSCULAR; INTRAVENOUS at 07:22

## 2019-01-01 RX ADMIN — MORPHINE SULFATE 2 MG: 2 INJECTION, SOLUTION INTRAMUSCULAR; INTRAVENOUS at 06:03

## 2019-01-01 RX ADMIN — LIDOCAINE HYDROCHLORIDE 20 ML: 10 INJECTION, SOLUTION EPIDURAL; INFILTRATION; INTRACAUDAL; PERINEURAL at 11:51

## 2019-01-01 RX ADMIN — SODIUM CHLORIDE 200 MG: 900 INJECTION, SOLUTION INTRAVENOUS at 09:06

## 2019-01-01 RX ADMIN — FUROSEMIDE 40 MG: 10 INJECTION, SOLUTION INTRAMUSCULAR; INTRAVENOUS at 16:18

## 2019-01-01 RX ADMIN — SODIUM CHLORIDE 1000 MG: 900 INJECTION, SOLUTION INTRAVENOUS at 21:15

## 2019-01-01 RX ADMIN — VENLAFAXINE 50 MG: 25 TABLET ORAL at 11:12

## 2019-01-01 RX ADMIN — NIMODIPINE 30 MG: 30 CAPSULE, LIQUID FILLED ORAL at 00:03

## 2019-01-01 RX ADMIN — ACETAMINOPHEN 650 MG: 325 TABLET, FILM COATED ORAL at 09:40

## 2019-01-01 RX ADMIN — DARUNAVIR 800 MG: 800 TABLET, FILM COATED ORAL at 12:14

## 2019-01-01 RX ADMIN — MORPHINE SULFATE 2 MG: 2 INJECTION, SOLUTION INTRAMUSCULAR; INTRAVENOUS at 04:31

## 2019-01-01 RX ADMIN — HEPARIN SODIUM 5000 UNITS: 5000 INJECTION INTRAVENOUS; SUBCUTANEOUS at 19:21

## 2019-01-01 RX ADMIN — IOPAMIDOL 70 ML: 755 INJECTION, SOLUTION INTRAVENOUS at 04:15

## 2019-01-01 RX ADMIN — SODIUM CHLORIDE 200 MG: 900 INJECTION, SOLUTION INTRAVENOUS at 22:20

## 2019-01-01 RX ADMIN — IBUPROFEN 600 MG: 600 TABLET ORAL at 01:33

## 2019-01-01 RX ADMIN — LANSOPRAZOLE 30 MG: 30 TABLET, ORALLY DISINTEGRATING ORAL at 09:10

## 2019-01-01 RX ADMIN — ACETAMINOPHEN 650 MG: 325 TABLET, FILM COATED ORAL at 04:14

## 2019-01-01 RX ADMIN — SENNOSIDES AND DOCUSATE SODIUM 2 TABLET: 8.6; 5 TABLET ORAL at 21:10

## 2019-01-01 RX ADMIN — NIMODIPINE 30 MG: 30 CAPSULE, LIQUID FILLED ORAL at 03:46

## 2019-01-01 RX ADMIN — LORAZEPAM 1 MG: 2 INJECTION INTRAMUSCULAR; INTRAVENOUS at 20:34

## 2019-01-01 RX ADMIN — SODIUM CHLORIDE 125 ML/HR: 450 INJECTION, SOLUTION INTRAVENOUS at 10:35

## 2019-01-01 RX ADMIN — METOPROLOL TARTRATE 50 MG: 50 TABLET ORAL at 09:11

## 2019-01-01 RX ADMIN — FENTANYL CITRATE 100 MCG: 50 INJECTION, SOLUTION INTRAMUSCULAR; INTRAVENOUS at 11:49

## 2019-01-01 RX ADMIN — INSULIN LISPRO 2 UNITS: 100 INJECTION, SOLUTION INTRAVENOUS; SUBCUTANEOUS at 11:52

## 2019-01-01 RX ADMIN — ACETAMINOPHEN 650 MG: 325 TABLET, FILM COATED ORAL at 11:12

## 2019-01-01 RX ADMIN — INSULIN LISPRO 2 UNITS: 100 INJECTION, SOLUTION INTRAVENOUS; SUBCUTANEOUS at 06:00

## 2019-01-01 RX ADMIN — SODIUM CHLORIDE 750 MG: 900 INJECTION, SOLUTION INTRAVENOUS at 09:45

## 2019-01-01 RX ADMIN — HEPARIN SODIUM 2000 UNITS: 5000 INJECTION, SOLUTION INTRAVENOUS; SUBCUTANEOUS at 11:05

## 2019-01-01 RX ADMIN — NIMODIPINE 30 MG: 30 CAPSULE, LIQUID FILLED ORAL at 04:01

## 2019-01-01 RX ADMIN — INSULIN LISPRO 2 UNITS: 100 INJECTION, SOLUTION INTRAVENOUS; SUBCUTANEOUS at 13:28

## 2019-01-01 RX ADMIN — NIMODIPINE 30 MG: 30 CAPSULE, LIQUID FILLED ORAL at 11:12

## 2019-01-01 RX ADMIN — AMANTADINE HYDROCHLORIDE 100 MG: 100 CAPSULE ORAL at 12:23

## 2019-01-01 RX ADMIN — Medication 400 MG: at 17:15

## 2019-01-01 RX ADMIN — ABACAVIR SULFATE 300 MG: 20 SOLUTION ORAL at 21:18

## 2019-01-01 RX ADMIN — VENLAFAXINE 50 MG: 25 TABLET ORAL at 07:59

## 2019-01-01 RX ADMIN — NIMODIPINE 30 MG: 30 CAPSULE, LIQUID FILLED ORAL at 23:01

## 2019-01-01 RX ADMIN — SODIUM CHLORIDE 2.5 MG/HR: 900 INJECTION, SOLUTION INTRAVENOUS at 04:30

## 2019-01-01 RX ADMIN — MORPHINE SULFATE 2 MG: 2 INJECTION, SOLUTION INTRAMUSCULAR; INTRAVENOUS at 15:22

## 2019-01-01 RX ADMIN — NIMODIPINE 30 MG: 30 CAPSULE, LIQUID FILLED ORAL at 13:27

## 2019-01-01 RX ADMIN — ACETAMINOPHEN 650 MG: 325 TABLET, FILM COATED ORAL at 04:31

## 2019-01-01 RX ADMIN — ASPIRIN 325 MG ORAL TABLET 650 MG: 325 PILL ORAL at 18:15

## 2019-01-01 RX ADMIN — SODIUM CHLORIDE 2.5 MG/HR: 900 INJECTION, SOLUTION INTRAVENOUS at 07:10

## 2019-01-01 RX ADMIN — POLYVINYL ALCOHOL 1 DROP: 14 SOLUTION/ DROPS OPHTHALMIC at 21:09

## 2019-01-01 RX ADMIN — ABACAVIR SULFATE 300 MG: 20 SOLUTION ORAL at 09:08

## 2019-01-01 RX ADMIN — NIMODIPINE 30 MG: 30 CAPSULE, LIQUID FILLED ORAL at 19:18

## 2019-01-01 RX ADMIN — Medication 400 MG: at 17:38

## 2019-01-01 RX ADMIN — SENNOSIDES AND DOCUSATE SODIUM 2 TABLET: 8.6; 5 TABLET ORAL at 21:35

## 2019-01-01 RX ADMIN — SODIUM CHLORIDE 1000 MG: 900 INJECTION, SOLUTION INTRAVENOUS at 20:29

## 2019-01-01 RX ADMIN — LORAZEPAM 1 MG: 2 INJECTION INTRAMUSCULAR; INTRAVENOUS at 21:37

## 2019-01-01 RX ADMIN — SENNOSIDES AND DOCUSATE SODIUM 2 TABLET: 8.6; 5 TABLET ORAL at 21:18

## 2019-01-01 RX ADMIN — VENLAFAXINE 50 MG: 25 TABLET ORAL at 07:38

## 2019-01-01 RX ADMIN — NIMODIPINE 30 MG: 30 CAPSULE, LIQUID FILLED ORAL at 16:36

## 2019-01-01 RX ADMIN — SODIUM CHLORIDE 1000 MG: 900 INJECTION, SOLUTION INTRAVENOUS at 08:16

## 2019-01-01 RX ADMIN — NIMODIPINE 30 MG: 30 CAPSULE, LIQUID FILLED ORAL at 19:44

## 2019-01-01 RX ADMIN — POTASSIUM CHLORIDE 20 MEQ: 200 INJECTION, SOLUTION INTRAVENOUS at 06:36

## 2019-01-01 RX ADMIN — VENLAFAXINE 50 MG: 25 TABLET ORAL at 16:08

## 2019-01-01 RX ADMIN — NIMODIPINE 30 MG: 30 CAPSULE, LIQUID FILLED ORAL at 07:46

## 2019-01-01 RX ADMIN — MORPHINE SULFATE 2 MG: 2 INJECTION, SOLUTION INTRAMUSCULAR; INTRAVENOUS at 09:45

## 2019-01-01 RX ADMIN — SODIUM BICARBONATE 100 MEQ: 84 INJECTION, SOLUTION INTRAVENOUS at 13:10

## 2019-01-01 RX ADMIN — FENTANYL CITRATE 50 MCG: 50 INJECTION INTRAMUSCULAR; INTRAVENOUS at 08:33

## 2019-01-01 RX ADMIN — MORPHINE SULFATE 4 MG: 4 INJECTION INTRAVENOUS at 14:43

## 2019-01-01 RX ADMIN — PERFLUTREN 1 ML: 6.52 INJECTION, SUSPENSION INTRAVENOUS at 13:00

## 2019-01-01 RX ADMIN — IBUPROFEN 600 MG: 600 TABLET ORAL at 23:39

## 2019-02-20 PROBLEM — I60.9 SAH (SUBARACHNOID HEMORRHAGE) (HCC): Chronic | Status: ACTIVE | Noted: 2019-01-01

## 2019-02-20 NOTE — ED PROVIDER NOTES
The patient did not answer her door and forced entry was used and patient was found confused on the floor. Unknown when she was last seen normal.  Patient responsive to pain only. + prior to arrival.  No Narcan given. The history is provided by the EMS personnel. Altered mental status This is a new problem. Episode onset: unknown. The problem has not changed since onset. Associated symptoms include unresponsiveness. Mental status baseline is normal.  Past medical history comments: HIV, polysubstance use. Past Medical History:  
Diagnosis Date  Acute renal failure ([address] 7/30/2014  Anxiety state, unspecified  AR (allergic rhinitis)  Arthritis   
 feet, mid back  Autoimmune disease ([address]  Candidiasis  Cervical dystonia  Chronic kidney disease last dialysis 12/28/14  
 started dialysis 8/2014 M/W/F. but has had port removed and is no longer on dialysis at this time.  Chronic pain  CKD (chronic kidney disease), stage V ([address]  Depression  E. coli septicemia ([facility] 8/1/2014  Edema  Elevated LFTs 8/1/2014  Family history of polycystic kidney  GERD (gastroesophageal reflux disease)  Herpes zoster Right arm  History of hepatitis C   
 HIV (human immunodeficiency virus infection) ([address] 7/30/2014  
 HIV disease ([address] 1991  Hypertension  Hyponatremia 7/30/2014  Hypotension  Infectious disease HIV  Liver disease   
 hep. c  
 Lumbago  Other acquired torsion dystonia  Pain in joint, ankle and foot  Polycystic kidney disease  Sepsis ([address] 7/30/2014  Shoulder pain  UTI (lower urinary tract infection) 7/30/2014 Past Surgical History:  
Procedure Laterality Date  HX BUNIONECTOMY Bilateral   
 HX VASCULAR ACCESS    
 inserted 8/14 and removed 12/27/14  VASCULAR SURGERY PROCEDURE UNLIST Right   
 tunnel catheter  VASCULAR SURGERY PROCEDURE UNLIST Right 10/21/14 radiocephalic fistula creation  VASCULAR SURGERY PROCEDURE UNLIST Right 3/4/83  
 brachiobasilic fistula creation Family History:  
Problem Relation Age of Onset  Kidney Disease Mother Polycystic kidney disease  Heart Disease Mother  Emphysema Mother  Other Mother   
     carotid  Pacemaker Mother  Lung Disease Mother  COPD Mother  Heart Failure Mother  Kidney Disease Maternal Grandmother  No Known Problems Father Social History Socioeconomic History  Marital status:  Spouse name: Not on file  Number of children: Not on file  Years of education: Not on file  Highest education level: Not on file Social Needs  Financial resource strain: Not on file  Food insecurity - worry: Not on file  Food insecurity - inability: Not on file  Transportation needs - medical: Not on file  Transportation needs - non-medical: Not on file Occupational History  Not on file Tobacco Use  Smoking status: Former Smoker Packs/day: 0.50 Years: 5.00 Pack years: 2.50 Last attempt to quit: 2013 Years since quittin.2  Smokeless tobacco: Never Used  Tobacco comment: quit . using the vapor cigarettes Substance and Sexual Activity  Alcohol use: No  
 Drug use: No  
 Sexual activity: Not on file Other Topics Concern  Not on file Social History Narrative  Not on file ALLERGIES: Codeine and Pcn [penicillins] Review of Systems Unable to perform ROS: Mental status change Vitals:  
 19 1733 BP: 118/74 Pulse: 92 Resp: 22 SpO2: 96% Weight: 53.3 kg (117 lb 8 oz) Height: 5' 7\" (1.702 m) Physical Exam  
Constitutional:  
Chronically ill-appearing HENT:  
Head: Normocephalic and atraumatic. Eyes:  
Pupils dilated to 6-7 mm and minimally reactive, patient not cooperative with extraocular movements but there is no forced deviation. Neck: Normal range of motion. Neck supple. No Brudzinski's sign and no Kernig's sign noted. Cardiovascular: Normal rate, regular rhythm and normal heart sounds. Pulmonary/Chest: Effort normal.  
Rhonchi bilaterally Abdominal: Soft. Musculoskeletal: Normal range of motion. Neurological: No sensory deficit. GCS eye subscore is 1. GCS verbal subscore is 2. GCS motor subscore is 5. Reflex Scores: 
     Patellar reflexes are 2+ on the right side and 2+ on the left side. Sensation to pain intact in all 4 extremities. Moves all 4 extremities. Not cooperative with complete cranial nerve or cerebellar or motor exam.  
Skin: Skin is warm. Nursing note and vitals reviewed. MDM Number of Diagnoses or Management Options Diagnosis management comments: Altered mental status with mildly elevated blood sugar. We'll attempt Narcan. No improvement with oxygenation. Uncertain if patient was hypoxic prior to arrival as oxygen was reapplied. Rule out rhabdo. CT head to exclude intracranial hemorrhage. wide differential, rectal temp pending. Amount and/or Complexity of Data Reviewed Clinical lab tests: ordered and reviewed (Results for orders placed or performed during the hospital encounter of 02/20/19 
-CBC WITH AUTOMATED DIFF Result                      Value             Ref Range WBC                         16.5 (H)          4.3 - 11.1 K* 
     RBC                         3.51 (L)          4.05 - 5.2 M* HGB                         10.9 (L)          11.7 - 15.4 * HCT                         33.5 (L)          35.8 - 46.3 % MCV                         95.4              79.6 - 97.8 * MCH                         31.1              26.1 - 32.9 * MCHC                        32.5              31.4 - 35.0 * RDW                         14.0              11.9 - 14.6 % PLATELET                    207               150 - 450 K/* MPV                         9.9               9.4 - 12.3 FL ABSOLUTE NRBC               0.00              0.0 - 0.2 K/* DF                          AUTOMATED NEUTROPHILS                 87 (H)            43 - 78 % LYMPHOCYTES                 9 (L)             13 - 44 % MONOCYTES                   4                 4.0 - 12.0 % EOSINOPHILS                 0 (L)             0.5 - 7.8 % BASOPHILS                   0                 0.0 - 2.0 % IMMATURE GRANULOCYTES       0                 0.0 - 5.0 %   
     ABS. NEUTROPHILS            14.3 (H)          1.7 - 8.2 K/* ABS. LYMPHOCYTES            1.5               0.5 - 4.6 K/* ABS. MONOCYTES              0.7               0.1 - 1.3 K/* ABS. EOSINOPHILS            0.0               0.0 - 0.8 K/* ABS. BASOPHILS              0.0               0.0 - 0.2 K/* ABS. IMM. GRANS.            0.1               0.0 - 0.5 K/* 
-METABOLIC PANEL, COMPREHENSIVE Result                      Value             Ref Range Sodium                      142               136 - 145 mm* Potassium                   4.0               3.5 - 5.1 mm* Chloride                    109 (H)           98 - 107 mmo* CO2                         20 (L)            21 - 32 mmol* Anion gap                   13                mmol/L Glucose                     178 (H)           65 - 100 mg/* BUN                         53 (H)            6 - 23 MG/DL Creatinine                  3.20 (H)          0.6 - 1.0 MG* 
     GFR est AA                  19 (L)            >60 ml/min/1* GFR est non-AA              16                ml/min/1.73m2 Calcium                     9.2               8.3 - 10.4 M* Bilirubin, total            0.4               0.2 - 1.1 MG*      ALT (SGPT)                  54                12 - 65 U/L   
 AST (SGOT)                  105 (H)           15 - 37 U/L Alk. phosphatase            109               50 - 130 U/L Protein, total              7.5               g/dL Albumin                     3.6               3.5 - 5.0 g/* Globulin                    3.9 (H)           2.3 - 3.5 g/* A-G Ratio                   0.9 -DRUG SCREEN, URINE Result                      Value             Ref Range PCP(PHENCYCLIDINE)          NEGATIVE BENZODIAZEPINES             POSITIVE                        
     COCAINE                     NEGATIVE AMPHETAMINES                NEGATIVE METHADONE                   NEGATIVE                        
     THC (TH-CANNABINOL)         NEGATIVE                        
     OPIATES                     POSITIVE                        
     BARBITURATES                NEGATIVE                        
-CK Result                      Value             Ref Range CK                          1,753 (H)         21 - 215 U/L  
-TROPONIN I Result                      Value             Ref Range Troponin-I, Qt.             6.49 (HH)         0.02 - 0.05 * 
-URINALYSIS W/ RFLX MICROSCOPIC Result                      Value             Ref Range Color                       YELLOW Appearance                  CLOUDY Specific gravity            1.016             1.001 - 1.02* pH (UA)                     5.5               5.0 - 9.0 Protein                     100 (A)           NEG mg/dL Glucose                     NEGATIVE          mg/dL Ketone                      NEGATIVE          NEG mg/dL      Bilirubin                   NEGATIVE          NEG           
 Blood                       LARGE (A)         NEG Urobilinogen                0.2               0.2 - 1.0 EU* Nitrites                    NEGATIVE          NEG Leukocyte Esterase          LARGE (A)         NEG           
     WBC                         20-50             0 /hpf        
     RBC                         0-3               0 /hpf Epithelial cells            0-3               0 /hpf Bacteria                    4+ (H)            0 /hpf        
) Tests in the radiology section of CPT®: ordered and reviewed (Xr Chest Sngl V Result Date: 2/20/2019 Chest X-ray  2/20/2019 5:56 PM Clinical indication:  60year-old found on bathroom floor. Altered level of consciousness. Comparison: 3/30/2016. Findings: Upright AP portable chest at 5:54 PM. The radiograph is rotated toward the right. Lungs are underventilated. There is asymmetric haziness on the left, particularly affecting the upper lung zones. No overt effusions. Cardiac silhouette is stable. IMPRESSION: 1. Asymmetric haziness in the left upper lobe, possibly related to developing airspace opacity and/or positioning. ) Discuss the patient with other providers: yes (Discussed with Dr. Kemar Reed, interventional neurosurgery. She reviewed the CT scan and does not believe the patient is herniating. She asked me to cancel the mannitol. She did ask us to secure the airway and transferred the patient to [address] [place] [address] downtown the ER with a will meet the patient for further evaluation and imaging.) Intubation Date/Time: 2/20/2019 7:37 PM 
Performed by: Natalia Barron MD 
Authorized by: Natalia Barron MD  
 
Consent:  
  Consent obtained:  Emergent situation Pre-procedure details:  
  Patient status:  Altered mental status Pretreatment meds: 10 mg of etomidate. Paralytics:  Succinylcholine Procedure details:  
  Preoxygenation:  Nonrebreather mask CPR in progress: no Intubation method:  Oral 
  Laryngoscope blade: Mac 3 Tube size (mm):  7.5 Tube type:  Cuffed Number of attempts:  1 Ventilation between attempts: no   
  Cricoid pressure: no   
  Tube visualized through cords: yes Placement assessment: ETT to lip:  22 Tube secured with: Adhesive tape and ETT canas Breath sounds:  Equal 
  Placement verification: chest rise and CXR verification CXR findings:  ETT in proper place Post-procedure details:  
  Patient tolerance of procedure: Tolerated well, no immediate complications Comments:  
   Glide scope utilized

## 2019-02-20 NOTE — ED TRIAGE NOTES
[facility] picked patient up from home for altered mental status after family did welfare check on her. Pt's neighbor forced entry into house and found pt slumped in bathroom floor and they called 911. She was brought out of bathroom and laid in floor. EMS states painful stimuli to arouse pt, purposeful movement but pt will only groan. Pt has not been given any medication by EMS or FD. BGL-261 IV 20 left arm, but it infiltrated.

## 2019-02-21 PROBLEM — G93.6 CEREBRAL EDEMA (HCC): Status: ACTIVE | Noted: 2019-01-01

## 2019-02-21 PROBLEM — I61.0 NONTRAUMATIC SUBCORTICAL HEMORRHAGE OF RIGHT CEREBRAL HEMISPHERE (HCC): Status: ACTIVE | Noted: 2019-01-01

## 2019-02-21 PROBLEM — N30.00 ACUTE CYSTITIS WITHOUT HEMATURIA: Status: ACTIVE | Noted: 2019-01-01

## 2019-02-21 PROBLEM — R77.8 ELEVATED TROPONIN: Status: ACTIVE | Noted: 2019-01-01

## 2019-02-21 PROBLEM — J18.9 PNEUMONIA OF LEFT LUNG DUE TO INFECTIOUS ORGANISM: Status: ACTIVE | Noted: 2019-01-01

## 2019-02-21 PROBLEM — G91.0 COMMUNICATING HYDROCEPHALUS (HCC): Status: ACTIVE | Noted: 2019-01-01

## 2019-02-21 PROBLEM — I60.11: Chronic | Status: ACTIVE | Noted: 2019-01-01

## 2019-02-21 NOTE — PROGRESS NOTES
Pt seen in ICU s/p admission CVA/SAH pt of Dr. Priya Camargo. Currently 
intubated/vent/cardene gtt/propofol. Sister and neighbor at bedside. Sister appears medicated?, and nodding off. Does answer questions and confirmed demographics and neighbor, who is most clear, assist. States pt's dad is on his way from [place]. Name and number below. Spouse is , pt has no children according to sister. Pt's father would be legal decision maker, if able, as pt does not have LW/HCPOA that sister is aware of.  Pt's condition is serious noted in MD notes and MD discussed with family. Pt has MCR/SHONDA. LTAC, STR at SNF, or IRC possibility, if needed. CM to follow for any assist and d/c POC. Care Management Interventions PCP Verified by CM: Yes(family confirms Skip as PCP) Mode of Transport at Discharge: Other (see comment) Transition of Care Consult (CM Consult): Discharge Planning Discharge Durable Medical Equipment: (none currently) Current Support Network: Own Home(lives with sisterMagdi -[phone], pt's dad is Janeen Agarwal, lives in [place] -[phone], spouse , no children, [place] [facility] - [phone]) Confirm Follow Up Transport: Self(drove self prior to hospitalization) Plan discussed with Pt/Family/Caregiver: Yes Freedom of Choice Offered: Yes [employer] Information Provided?: (confirms MCR/SHONDA - able to get rx) Discharge Location Discharge Placement: Unable to determine at this time

## 2019-02-21 NOTE — PROGRESS NOTES
TRANSFER - IN REPORT: 
 
Verbal report received from NAGA Arechiga(name) on [facility]  being received from ED(unit) for routine progression of care Report consisted of patients Situation, Background, Assessment and  
Recommendations(SBAR). Information from the following report(s) SBAR, Kardex, ED Summary, Intake/Output, Recent Results, Med Rec Status and Alarm Parameters  was reviewed with the receiving nurse. Opportunity for questions and clarification was provided. Assessment completed upon patients arrival to unit and care assumed.

## 2019-02-21 NOTE — PROGRESS NOTES
Ventilator check complete; patient has a #7.5 ET tube secured at the 22 at the teeth. Patient is not sedated. Patient is able to follow commands. Breath sounds are coarse. Trachea is midline, Negative for subcutaneous air, and chest excursion is symmetric. Patient is also Negative for cyanosis and is Negative for pitting edema. All alarms are set and audible. Resuscitation bag is at the head of the bed. Ventilator Settings Mode FIO2 Rate Tidal Volume Pressure PEEP I:E Ratio PRVC  40 % 15  450 ml     8 cm H20 Peak airway pressure: 14 cm H2O Minute ventilation: 8 l/min ABG: Results for Kathie Hartman (MRN [id number]) as of 2/21/2019 08:16 
 2/21/2019 03:15 Exhaled minute volume 7.20  
pH (POC) 7.356 pCO2 (POC) 33.1 (L)  
pO2 (POC) 218 (H) HCO3 (POC) 18.5 (L)  
sO2 (POC) 100 (H) Base deficit (POC) 6 FIO2 (POC) 50 Patient temp. 98.6 Specimen type (POC) ARTERIAL Set Rate 15 Site DRAWN FROM ARTERI. .. Device: VENT Mode Pressure regulate. .. Tidal volume 450 PEEP/CPAP (POC) 8 Allens test (POC) NOT APPLICABLE Inspiratory Time 0.9 [place]

## 2019-02-21 NOTE — ED NOTES
TRANSFER - OUT REPORT: 
 
Verbal report given to Joey Rios on Mary Ellen Wilson  being transferred to ER for routine progression of care Report consisted of patients Situation, Background, Assessment and  
Recommendations(SBAR). Information from the following report(s) SBAR was reviewed with the receiving nurse. Lines:  
Peripheral IV 02/20/19 Right Forearm (Active) Site Assessment Clean, dry, & intact 2/20/2019  6:09 PM  
Phlebitis Assessment 0 2/20/2019  6:09 PM  
Infiltration Assessment 0 2/20/2019  6:09 PM  
   
Peripheral IV 02/20/19 Left;Upper Arm (Active) Opportunity for questions and clarification was provided.

## 2019-02-21 NOTE — PROGRESS NOTES
Late Entry: Pt preoxygenated with 100% NRB. HR 90's and O2 sat 100% @ 1940. Pt intubated by Dr. Lisset Salazar via Glidescope without difficulty x 1 attempt with size 7.5 ETT. Positive color change on pCO2 detector, BBS equal and clear. ETT secured via device @ 22 cm paul R lip. Pt placed on Servo I vent with settings at [facility] 450, RR12, 100% and +8 Peep. Xray taken to confirm tube placement. NG tube also placed. Pt transferred to ER at [address] [place] [address] by EMS.

## 2019-02-21 NOTE — PROGRESS NOTES
Physical Therapy Note:  
 
Orders received, chart reviewed and patient discussed at IDT rounds. Patient has not yet had neurosurgery intervention. Will hold and continue to follow as patient becomes appropriate. Thank you, DEVENDRA TeranT

## 2019-02-21 NOTE — PROGRESS NOTES
Pharmacokinetic Consult to Pharmacist 
 
Jaclementejerome Kaminski is a 60 y.o. female being treated for CAP, UTI with vancomycin. Weight: 54.6 kg (120 lb 4.8 oz) Lab Results Component Value Date/Time BUN 53 (H) 02/20/2019 05:51 PM  
 Creatinine 3.20 (H) 02/20/2019 05:51 PM  
 WBC 16.5 (H) 02/20/2019 05:51 PM  
 Procalcitonin 13.3 07/30/2014 02:09 AM  
 Lactic acid 1.1 02/21/2019 12:26 AM  
 Lactic acid 2.6 (H) 07/30/2014 02:09 AM  
 Lactic Acid (POC) 2.63 (H) 02/20/2019 07:24 PM  
  
Estimated Creatinine Clearance: 16.3 mL/min (A) (based on SCr of 3.2 mg/dL (H)). CULTURES: 
All Micro Results Procedure Component Value Units Date/Time CULTURE, BLOOD [[id number]] Collected:  02/21/19 0026 Order Status:  Completed Specimen:  Blood Updated:  02/21/19 0032 CULTURE, BLOOD [[id number]] Collected:  02/21/19 0026 Order Status:  Completed Specimen:  Blood Updated:  02/21/19 0031 CULTURE, RESPIRATORY/SPUTUM/BRONCH Lonza Barrack STAIN [[id number]] Order Status:  Sent Specimen:  Sputum CULTURE, URINE [[id number]] Collected:  02/20/19 2237 Order Status:  Completed Specimen:  Urine from Mi Specimen Updated:  02/20/19 2247 Day 1 of vancomycin. Goal trough is 15-20. Vancomycin dose initiated at 1,500 mg load, followed by intermittent dosing due to renal function. Will continue to follow patient. Thank you, Roman Whitmore, PharmD

## 2019-02-21 NOTE — H&P
History and Physical 
 
Patient: Silvia Lopez MRN: [id number]  SSN: [id number] YOB: 1960  Age: 60 y.o. Sex: female Subjective:  
  
Silvia Lopez is a 60 y.o. female who was found unresponsive at her home by family. Per report last known normal is unknown, he sister is at bedside but sedated and poor historian, states the pt has not been feeling well for several days. Pt did not answer her door today and her neighbor and sister called EMS to respond, forced entry into home and pt found in bathroom floor unresponsive. Pt taken to [facility] ED for initial evaluation. Per notes pt was awake upon arrival with intermittent command following. Pt had a CT of her head and noted R SAH with ICH/IVH. Dr. Bashir Carrillo consulted to evaluate pt for underlying vascular abnormality. Pt was intubated prior to arrival by Dr. Curt Mccoy as pt had decreased LOC. Pt arrived to [address] [place] [address] Dr. Claudette Ojeda  ED intubated and on propofol gtt via ground EMS. Pt wd to pain all extrems and followed commands intermittent. Pt taken to CT for STAT CTA of head and neck which showed pt with R ICH/IVH and R SAH, final read is pending, but noted L MCA aneurysm and what appears to be a large R MCA aneurysm. Per sister pt's mother  from ruptured cerebral aneurysm years ago. Pt has extensive medical history: HIV, Hep C, and CKD. Sister is unsure of her medication compliance, but states she knows pt takes her HIV meds daily, dx 's. Pt is smoker but sister denies any ETOH or elicit drugs. Pt does take multiple chronic pain meds per sister. There is a neighbor who is with pt's sister as sister is very upset and has taken sedating medications. Pt admitted to ICU under [address] protocol. Per sister the pt knew that she had aneurysms in her brain, but no treatment in past. 
 
Past Medical History:  
Diagnosis Date  Acute renal failure ([facility] 2014  Anxiety state, unspecified  AR (allergic rhinitis)  Arthritis feet, mid back  Autoimmune disease ([facility]  Candidiasis  Cervical dystonia  Chronic kidney disease last dialysis 14  
 started dialysis 2014 M/W/F. but has had port removed and is no longer on dialysis at this time.  Chronic pain  CKD (chronic kidney disease), stage V ([address]  Depression  E. coli septicemia ([facility] [address] 2014  Edema  Elevated LFTs 2014  Family history of polycystic kidney  GERD (gastroesophageal reflux disease)  Herpes zoster Right arm  History of hepatitis C   
 HIV (human immunodeficiency virus infection) ([facility] [address] [facility] 2014  
 HIV disease ([facility] [address]   Hypertension  Hyponatremia 2014  Hypotension  Infectious disease HIV  Liver disease   
 hep. c  
 Lumbago  Other acquired torsion dystonia  Pain in joint, ankle and foot  Polycystic kidney disease  Sepsis ([facility] [address] [facility] 2014  Shoulder pain  UTI (lower urinary tract infection) 2014 Past Surgical History:  
Procedure Laterality Date  HX BUNIONECTOMY Bilateral   
 HX VASCULAR ACCESS    
 inserted  and removed 14  VASCULAR SURGERY PROCEDURE UNLIST Right   
 tunnel catheter  VASCULAR SURGERY PROCEDURE UNLIST Right   
 radiocephalic fistula creation  VASCULAR SURGERY PROCEDURE UNLIST Right 12  
 brachiobasilic fistula creation Family History Problem Relation Age of Onset  Kidney Disease Mother Polycystic kidney disease  Heart Disease Mother  Emphysema Mother  Other Mother   
     carotid  Pacemaker Mother  Lung Disease Mother  COPD Mother  Heart Failure Mother  Kidney Disease Maternal Grandmother  No Known Problems Father Social History Tobacco Use  Smoking status: Former Smoker Packs/day: 0.50 Years: 5.00 Pack years: 2.50 Last attempt to quit: 2013 Years since quittin.2  Smokeless tobacco: Never Used  Tobacco comment: quit 7/14. using the vapor cigarettes Substance Use Topics  Alcohol use: No  
  
Prior to Admission medications Medication Sig Start Date End Date Taking? Authorizing Provider HYDROcodone-acetaminophen (NORCO)  mg tablet Take 1 Tab by mouth every six (6) hours as needed for Pain. Max Daily Amount: 4 Tabs. 2/15/19   Marlyn Roque MD  
HYDROcodone-acetaminophen [facility])  mg tablet Take 1 Tab by mouth every six (6) hours as needed for Pain. Max Daily Amount: 4 Tabs. 1/16/19   Marlyn Roque MD  
HYDROcodone-acetaminophen [facility])  mg tablet Take 1 Tab by mouth every six (6) hours as needed for Pain. Max Daily Amount: 4 Tabs. 12/17/18   Marlyn Roque MD  
fluticasone (FLONASE) 50 mcg/actuation nasal spray 2 Sprays by Both Nostrils route daily. 12/17/18   Marlyn Roque MD  
carisoprodol (SOMA) 350 mg tablet Take 1 Tab by mouth four (4) times daily. Max Daily Amount: 1,400 mg. 11/20/18   Marlyn Roque MD  
ALPRAZolam [facility]) 1 mg tablet Take 1 Tab by mouth four (4) times daily. Max Daily Amount: 4 mg. Indications: anxiety 9/17/18   Marlyn Roque MD  
venlafaxine-SR [facility] 150 mg capsule Take 1 Cap by mouth daily. Indications: ANXIETY WITH DEPRESSION, major depressive disorder 6/15/18   Marlyn Roque MD  
cetirizine (ZYRTEC) 10 mg tablet Take 10 mg by mouth daily. Provider, Historical  
ascorbic acid, vitamin C, (VITAMIN C) 500 mg tablet Take 500 mg by mouth daily. Provider, Historical  
abacavir (ZIAGEN) 300 mg tablet Take 300 mg by mouth two (2) times a day. 10/20/15   Provider, Historical  
multivitamin (ONE A DAY) tablet Take 1 tablet by mouth daily. Indications: VITAMIN DEFICIENCY PREVENTION    Provider, Historical  
ritonavir (NORVIR) 100 mg tablet Take 100 mg by mouth every morning. Indications: HIV INFECTION    Provider, Historical  
darunavir (PREZISTA) 800 mg tablet Take 800 mg by mouth every morning. Indications: HIV INFECTION    Provider, Historical  
dolutegravir (TIVICAY) 50 mg tab tablet Take 50 mg by mouth every morning. Indications: HIV INFECTION    Provider, Historical  
cholecalciferol (VITAMIN D3) 1,000 unit tablet Take 1,000 Units by mouth daily (with dinner). Indications: PREVENTION OF VITAMIN D DEFICIENCY    Provider, Historical  
calcium citrate-vitamin d3 (CITRACAL + D) 315-200 mg-unit tab Take 1 tablet by mouth two (2) times a day. Indications: HYPOCALCEMIA PREVENTION    Provider, Historical  
ferrous gluconate 324 mg (38 mg iron) tablet Take  by mouth daily (with dinner). Provider, Historical  
  
 
Allergies Allergen Reactions  Codeine Itching Ear itch, can't breath  Pcn [Penicillins] Rash and Itching Review of Systems: 
Unable to verify secondary to pt condition. ( sister is only relative and is poor historian). Objective:  
 
Vitals:  
 02/20/19 2109 02/20/19 2110 02/20/19 2112 02/20/19 2248 Pulse:  98  83 Resp:  25  13 Temp: 98.5 °F (36.9 °C) SpO2:  100%  100% Weight:   120 lb 4.8 oz (54.6 kg) Physical Exam: 
General:  Pt intubated and sedated. HEENT: Supple, symmetrical, trachea midline, no adenopathy, thyroid: no enlargment/tenderness/nodules, no carotid bruit. Lungs:   rhonci bilat. Heart:  Regular rate and rhythm, S1, S2 normal, no murmur, click, rub or gallop. Abdomen:   Soft, non-tender. Bowel sounds normal. No masses,  No organomegaly. Extremities: Extremities normal, atraumatic, no cyanosis or edema. Pulses: 2+ and symmetric all extremities. Skin: Skin color, texture, turgor normal. No rashes or lesions Neurologic: Pt sedated but will wd pain, followed commands to give thumbs up and wiggled bilat toes with sedation turned off on arrival. +gag, +corneals. PERRL+4/B. Assessment:  
 
Hospital Problems  Date Reviewed: 12/17/2018 Codes Class Noted POA SAH (subarachnoid hemorrhage) (HCC) (Chronic) ICD-10-CM: I60.9 ICD-9-CM: 430  2019 Yes DYSPHAGIA SCORE ON ADMIT: 0 ( NPO and intubated) HUNT MCDANIEL ON ADMIT: 4 
MACIAS GRADE ON ADMIT: 4 Plan:  
 
NEURO:Pt is SAH with IVH/R  [address] admitted to ICU. She is on [facility] protocol - nimotop, Mg, lipitor. Hoang Mcdaniel 4, Macias  Grade 4. CT head shows right frontal ICH and IVH with extensive SAH from a large right MCA aneurysm. CTA shows bilateral MCA mirror aneurysms, with the right one being larger and the one that bled with the  [address] being right at the dome of the right aneurysm. Pt had Left central line placed in ICU and left radial art line. Pt had been intubated with NGT placed prior to arrival. Family updated on pt condition and seriousness of her condition. Sedation turned off and pt opened eyes and followed commands all extrems, stuck tongue out to command. Mildine. RESP: Pt intubated with 7.5 OETT, 22 at teeth, intact with breath sound bilat. No distress. Chest CT done upon arrival: MERY, density LLL and small left pleural effusion. Pt on PRVC @ 100%, P8, R15: AB.3/37/545, decreased to 50%. CXR confirms OETT placement and NGT. Left IJ in place. CV:MAP GOAL 70-90, cardene gtt prn, 2D Echo ordered. Trop 6.4 at sending facility, repeat here is 3.7,  Will repeat 4am.  CK 1753, repeat pending. Lactic acid 2.6, repeat pending. HEME: [facility] 10/33,  NEPH: bun/cr: 53/3.2 (hx of CKD) recheck pending post IVF x3L. GI:NPO, pepcid, consult nutrition, electrolyte protocol. Pt mag 1.1, will give 4gm IV and repeat. ID: [id number]. CT chest shows MERY pneumonia, will castro cx and start cefepime/vanc. Pt had one blood cx drawn at sending facility and given 2gm rocephin IV and azithromax 500mg IV x1 prior to transfer to [facility] DT. LINES: Left IJ quad, left art line. Shmuel. Signed By: Raji Humphries NP 2019

## 2019-02-21 NOTE — PROGRESS NOTES
A follow up visit was made to the patient. Emotional support, spiritual presence and  
prayer were provided.  left a  contact card with the patient. Ann Burton

## 2019-02-21 NOTE — PROGRESS NOTES
Second sister of patient, Celestina Melvin, at bedside of patient. States she is a retired RN- tearful but very appropriate for situation. Understanding of clinical picture. Phone number is [phone]. States she will be staying in [place]. Celestina Melvin has requested to be the patient . Notified Marcelo Starks NP. Patient also has a brother names Bert Mckeon who is driving the father of patient up from [place] tomorrow.

## 2019-02-21 NOTE — PROCEDURES
ARTERIAL LINE (A-Line) PLACEMENT Date: 2-20-19 Time: 2200 Indication: Hemodynamic monitoring Attending: Dr. Maddy Smith. A time-out was completed verifying correct patient, procedure, site, positioning, and special equipment if applicable. [place]s test was performed to ensure adequate perfusion. The patients left wrist was prepped and draped in sterile fashion. 1% Lidocaine was used to anesthetize the area. A 20G  Arrow arterial line was introduced into the radial artery. The catheter was threaded over the guide wire and the needle was removed with appropriate pulsatile blood return. The catheter was then sutured in place to the skin and a sterile dressing applied. Perfusion to the extremity distal to the point of catheter insertion was checked and found to be adequate. Dr. Carleen Medeiros was in agreement with plan of care and available at all times during procedure. The patient tolerated the procedure well and there were no complications

## 2019-02-21 NOTE — PROGRESS NOTES
Pt arrived via EMS intubated. Transported to CT and then to ICU without incident. Placed on vent in ICU with charted settings.

## 2019-02-21 NOTE — PROGRESS NOTES
Pt MAP lower than 70. Neuro status unchanged. Call placed to MD. No orders received, will continue to monitor patient blood pressure and neuro status.

## 2019-02-21 NOTE — PROGRESS NOTES
Pt arrived to [facility] ED via EMS; registered at door and taken to CT for imaging. Accompanied by Viktor Jenkins RN, Modena Soulier, NP and EMS team. 
 
VSS, pt easy to wake, follows commands with all extrems readily. Pt taken to ICU 3102 after imaging. Continue to monitor.

## 2019-02-21 NOTE — PROGRESS NOTES
Order received, chart reviewed. Pt is intubated and pending surgery, will follow up for evaluation when pt is stable/ as schedule allows.  
 
Julianne Adam, OT

## 2019-02-21 NOTE — CONSULTS
PM&R Rehab Consult Subjective:  
 
Date of Consultation:  February 21, 2019 Referring Physician: Dr. Yaron Chowdary 
PCP; Domenica Abbott MD 
Patient is a 60 y.o. female who is being seen for rehab recommendations s/p admission for Aneurysmal rupture with R SAH/IVH/ICH Principal Problem: 
  Nontraumatic subarachnoid hemorrhage from right middle cerebral artery ([address] (2/20/2019) Active Problems: 
  Acute cystitis without hematuria (2/21/2019) Pneumonia of left lung due to infectious organism (2/21/2019) Nontraumatic subcortical hemorrhage of right cerebral hemisphere ([address] (2/21/2019) Communicating hydrocephalus (2/21/2019) Elevated troponin (2/21/2019) HPI; Ms. Yousuf Peguero is a previously functionally independent, right hand dominant 62yo WF with a PMH of polysubstance abuse, HIV (1990s), cervical dystonia and chronic pain/narcotics, CKD V(previously on HD 2014), autoimmune dz, arthritis, Hep C, HTN, PCKD who presented to the ED 2/20 after being found unresponsive by her sister. Unknown when she was last normal. Her sister and neighbor could not reach her, and she would not answer the door. EMS was called and forced entry into home. Pt was found unresponsive on the bathroom floor. She was first brought to [facility] where she was noted to be awake and fc's intermittently. Head Ct showed a right SAH with IVH/ICH. Dr Yaron Chowdary was asked to review and there was suspicion for underlying vascular abnormality. She has a family history of her mother who had a fatal ruptured aneurysm. She was transferred to [facility]. Pt taken to CT for STAT CTA of head and neck which showed pt with R ICH/IVH and R SAH and noted L MCA aneurysm and what appears to be a large R MCA aneurysm. On eval here, she was noted to have  
followed commands to give thumbs up and wiggled bilat toes with  sedation turned off on arrival. +gag, +corneals. PERRL+4/B.  Pt is a Hunt Mcdaniel score of 4, Macias Grade 4 . Pt had Left central line placed in ICU and left radial art line. Pt had been intubated with NGT placed prior to arrival. Chest CT down upon arrival: MERY, density LLL and small left pleural effusion. Pt on PRVC @ 100%, P8, R15: AB.3/37/545, decreased to 50%. CXR confirms OETT placement and NGT. She has been placed on Cefepime and Vanc. ( had received received rocephin and azithromax at [facility]). Pts BUN is 53, creat 3.20, now 2.32 with fluids. CK 1753. WBC elevated at 16.5 on admit, now at 20.7 . Lactic acid 2.6. Blood cultures pending. IRC is being consulted in anticipation for eventual need for rehabilitation Past Medical History:  
Diagnosis Date  Acute renal failure ([address] 2014  Anxiety state, unspecified  AR (allergic rhinitis)  Arthritis   
 feet, mid back  Autoimmune disease ([address]  Candidiasis  Cervical dystonia  Chronic kidney disease last dialysis 14  
 started dialysis 2014 M//. but has had port removed and is no longer on dialysis at this time.  Chronic pain  CKD (chronic kidney disease), stage V ([address]  Depression  E. coli septicemia ([address] 2014  Edema  Elevated LFTs 2014  Family history of polycystic kidney  GERD (gastroesophageal reflux disease)  Herpes zoster Right arm  History of hepatitis C   
 HIV (human immunodeficiency virus infection) ([address] 2014  
 HIV disease ([address]   Hypertension  Hyponatremia 2014  Hypotension  Infectious disease HIV  Liver disease   
 hep. c  
 Lumbago  Other acquired torsion dystonia  Pain in joint, ankle and foot  Polycystic kidney disease  Sepsis ([address] 2014  Shoulder pain  UTI (lower urinary tract infection) 2014 Family History Problem Relation Age of Onset  Kidney Disease Mother Polycystic kidney disease  Heart Disease Mother  Emphysema Mother  Other Mother   
     carotid  Pacemaker Mother  Lung Disease Mother  COPD Mother  Heart Failure Mother  Kidney Disease Maternal Grandmother  No Known Problems Father Social History Tobacco Use  Smoking status: Former Smoker Packs/day: 0.50 Years: 5.00 Pack years: 2.50 Last attempt to quit: 2013 Years since quittin.2  Smokeless tobacco: Never Used  Tobacco comment: quit . using the vapor cigarettes Substance Use Topics  Alcohol use: No  
 
Past Surgical History:  
Procedure Laterality Date  HX BUNIONECTOMY Bilateral   
 HX VASCULAR ACCESS    
 inserted  and removed 14  VASCULAR SURGERY PROCEDURE UNLIST Right   
 tunnel catheter  VASCULAR SURGERY PROCEDURE UNLIST Right   
 radiocephalic fistula creation  VASCULAR SURGERY PROCEDURE UNLIST Right 0/7/10  
 brachiobasilic fistula creation Prior to Admission medications Medication Sig Start Date End Date Taking? Authorizing Provider HYDROcodone-acetaminophen (NORCO)  mg tablet Take 1 Tab by mouth every six (6) hours as needed for Pain. Max Daily Amount: 4 Tabs. 2/15/19   Paula Person MD  
HYDROcodone-acetaminophen [facility])  mg tablet Take 1 Tab by mouth every six (6) hours as needed for Pain. Max Daily Amount: 4 Tabs. 19   Paula Person MD  
HYDROcodone-acetaminophen [facility])  mg tablet Take 1 Tab by mouth every six (6) hours as needed for Pain. Max Daily Amount: 4 Tabs. 18   Paula Person MD  
fluticasone (FLONASE) 50 mcg/actuation nasal spray 2 Sprays by Both Nostrils route daily. 18   Paula Person MD  
carisoprodol (SOMA) 350 mg tablet Take 1 Tab by mouth four (4) times daily. Max Daily Amount: 1,400 mg. 18   Paula Person MD  
ALPRAZolam Donia Soulier) 1 mg tablet Take 1 Tab by mouth four (4) times daily. Max Daily Amount: 4 mg.  Indications: anxiety 18   Paula Person MD  
 venlafaxine-SR (EFFEXOR-XR) 150 mg capsule Take 1 Cap by mouth daily. Indications: ANXIETY WITH DEPRESSION, major depressive disorder 6/15/18   Carola Mckeon MD  
cetirizine (ZYRTEC) 10 mg tablet Take 10 mg by mouth daily. Provider, Historical  
ascorbic acid, vitamin C, (VITAMIN C) 500 mg tablet Take 500 mg by mouth daily. Provider, Historical  
abacavir (ZIAGEN) 300 mg tablet Take 300 mg by mouth two (2) times a day. 10/20/15   Provider, Historical  
multivitamin (ONE A DAY) tablet Take 1 tablet by mouth daily. Indications: VITAMIN DEFICIENCY PREVENTION    Provider, Historical  
ritonavir (NORVIR) 100 mg tablet Take 100 mg by mouth every morning. Indications: HIV INFECTION    Provider, Historical  
darunavir (PREZISTA) 800 mg tablet Take 800 mg by mouth every morning. Indications: HIV INFECTION    Provider, Historical  
dolutegravir (TIVICAY) 50 mg tab tablet Take 50 mg by mouth every morning. Indications: HIV INFECTION    Provider, Historical  
cholecalciferol (VITAMIN D3) 1,000 unit tablet Take 1,000 Units by mouth daily (with dinner). Indications: PREVENTION OF VITAMIN D DEFICIENCY    Provider, Historical  
calcium citrate-vitamin d3 (CITRACAL + D) 315-200 mg-unit tab Take 1 tablet by mouth two (2) times a day. Indications: HYPOCALCEMIA PREVENTION    Provider, Historical  
ferrous gluconate 324 mg (38 mg iron) tablet Take  by mouth daily (with dinner). Provider, Historical  
 
Allergies Allergen Reactions  Codeine Itching Ear itch, can't breath  Pcn [Penicillins] Rash and Itching Review of Systems:  Review of systems not obtained due to patient factors. Pt orally intubated, but does nod y/n fairly consistently; + headache, denies fever or chills, denies sob or heart palpitations, denies numbness or weakness. Wanting ETT out; (explained to her why she had wrist restraints on ) Objective:  
 
Vitals: 
Blood pressure 103/55, pulse 68, temperature 99.3 °F (37.4 °C), resp.  rate 15, weight 120 lb 4.8 oz (54.6 kg), SpO2 100 %. Temp (24hrs), Av.2 °F (37.3 °C), Min:98.5 °F (36.9 °C), Max:99.7 °F (37.6 °C) Intake and Output: 
 1901 -  0700 In: 617.2 [I.V.:497.2] Out: 1090 [[id number]] Physical Exam: 
General:  Alert, oriented , nods y/n consistently. Knew she was in hospital. Reacted with facial expression when told she was in the hospital for [address]; trying to converse with oral ETT Lungs:   Clear to auscultation bilaterally. Bilateral rhonchi Heart:  Regular rate and rhythm, S1, S2 stable, no murmur, click, rub or gallop. Abdomen:   Soft, non-tender. Bowel sounds present. No masses,  No organomegaly. Genitourinary: blanco Neuro Muscular: Fcs, strength symmetrical 
PERRLA, EOMI, no visual field deficits Pulling on wrist restraints Sensation intact Biting on ET tube Skin:  No rashes, lesions, or signs/symptoms or infection. No edema Labs/Studies: 
Recent Results (from the past 72 hour(s)) EKG, 12 LEAD, INITIAL Collection Time: 19  5:41 PM  
Result Value Ref Range Ventricular Rate 76 BPM  
 Atrial Rate 159 BPM  
 QRS Duration 94 ms Q-T Interval 472 ms QTC Calculation (Bezet) 531 ms Calculated R Axis 82 degrees Calculated T Axis -129 degrees Diagnosis    
  !! AGE AND GENDER SPECIFIC ECG ANALYSIS !! 
Undetermined rhythm ST & T wave abnormality, consider inferolateral ischemia Prolonged QT Abnormal ECG When compared with ECG of 01-AUG-2014 20:47, 
Current undetermined rhythm precludes rhythm comparison, needs review T wave inversion now evident in Inferior leads T wave inversion now evident in Anterolateral leads CBC WITH AUTOMATED DIFF Collection Time: 19  5:51 PM  
Result Value Ref Range WBC 16.5 (H) 4.3 - 11.1 K/uL  
 RBC 3.51 (L) 4.05 - 5.2 M/uL  
 HGB 10.9 (L) 11.7 - 15.4 g/dL HCT 33.5 (L) 35.8 - 46.3 % MCV 95.4 79.6 - 97.8 FL  
 MCH 31.1 26.1 - 32.9 PG  
 MCHC 32.5 31.4 - 35.0 g/dL RDW 14.0 11.9 - 14.6 % PLATELET 071 522 - 668 K/uL MPV 9.9 9.4 - 12.3 FL ABSOLUTE NRBC 0.00 0.0 - 0.2 K/uL  
 DF AUTOMATED NEUTROPHILS 87 (H) 43 - 78 % LYMPHOCYTES 9 (L) 13 - 44 % MONOCYTES 4 4.0 - 12.0 % EOSINOPHILS 0 (L) 0.5 - 7.8 % BASOPHILS 0 0.0 - 2.0 % IMMATURE GRANULOCYTES 0 0.0 - 5.0 %  
 ABS. NEUTROPHILS 14.3 (H) 1.7 - 8.2 K/UL  
 ABS. LYMPHOCYTES 1.5 0.5 - 4.6 K/UL  
 ABS. MONOCYTES 0.7 0.1 - 1.3 K/UL  
 ABS. EOSINOPHILS 0.0 0.0 - 0.8 K/UL  
 ABS. BASOPHILS 0.0 0.0 - 0.2 K/UL  
 ABS. IMM. GRANS. 0.1 0.0 - 0.5 K/UL METABOLIC PANEL, COMPREHENSIVE Collection Time: 02/20/19  5:51 PM  
Result Value Ref Range Sodium 142 136 - 145 mmol/L Potassium 4.0 3.5 - 5.1 mmol/L Chloride 109 (H) 98 - 107 mmol/L  
 CO2 20 (L) 21 - 32 mmol/L Anion gap 13 mmol/L Glucose 178 (H) 65 - 100 mg/dL BUN 53 (H) 6 - 23 MG/DL Creatinine 3.20 (H) 0.6 - 1.0 MG/DL  
 GFR est AA 19 (L) >60 ml/min/1.73m2 GFR est non-AA 16 ml/min/1.73m2 Calcium 9.2 8.3 - 10.4 MG/DL Bilirubin, total 0.4 0.2 - 1.1 MG/DL  
 ALT (SGPT) 54 12 - 65 U/L  
 AST (SGOT) 105 (H) 15 - 37 U/L Alk. phosphatase 109 50 - 130 U/L Protein, total 7.5 g/dL Albumin 3.6 3.5 - 5.0 g/dL Globulin 3.9 (H) 2.3 - 3.5 g/dL A-G Ratio 0.9 CK Collection Time: 02/20/19  5:51 PM  
Result Value Ref Range CK 1,753 (H) 21 - 215 U/L  
TROPONIN I Collection Time: 02/20/19  5:51 PM  
Result Value Ref Range Troponin-I, Qt. 6.49 (HH) 0.02 - 0.05 NG/ML  
DRUG SCREEN, URINE Collection Time: 02/20/19  6:12 PM  
Result Value Ref Range PCP(PHENCYCLIDINE) NEGATIVE BENZODIAZEPINES POSITIVE    
 COCAINE NEGATIVE AMPHETAMINES NEGATIVE METHADONE NEGATIVE     
 THC (TH-CANNABINOL) NEGATIVE     
 OPIATES POSITIVE    
 BARBITURATES NEGATIVE URINALYSIS W/ RFLX MICROSCOPIC Collection Time: 02/20/19  6:12 PM  
Result Value Ref Range Color YELLOW Appearance CLOUDY Specific gravity 1.016 1.001 - 1.023    
 pH (UA) 5.5 5.0 - 9.0 Protein 100 (A) NEG mg/dL Glucose NEGATIVE  mg/dL Ketone NEGATIVE  NEG mg/dL Bilirubin NEGATIVE  NEG Blood LARGE (A) NEG Urobilinogen 0.2 0.2 - 1.0 EU/dL Nitrites NEGATIVE  NEG Leukocyte Esterase LARGE (A) NEG    
 WBC 20-50 0 /hpf  
 RBC 0-3 0 /hpf Epithelial cells 0-3 0 /hpf Bacteria 4+ (H) 0 /hpf POC LACTIC ACID Collection Time: 02/20/19  7:24 PM  
Result Value Ref Range Lactic Acid (POC) 2.63 (H) 0.5 - 1.9 mmol/L  
CULTURE, URINE Collection Time: 02/20/19 10:37 PM  
Result Value Ref Range Special Requests: NO SPECIAL REQUESTS Culture result:     
  NO GROWTH AFTER SHORT PERIOD OF INCUBATION. FURTHER RESULTS TO FOLLOW AFTER OVERNIGHT INCUBATION. TROPONIN I Collection Time: 02/20/19 10:40 PM  
Result Value Ref Range Troponin-I, Qt. 3.72 (HH) 0.02 - 0.05 NG/ML  
LIPID PANEL Collection Time: 02/20/19 10:40 PM  
Result Value Ref Range LIPID PROFILE Cholesterol, total 83 <200 MG/DL Triglyceride 104 35 - 150 MG/DL  
 HDL Cholesterol 25 (L) 40 - 60 MG/DL  
 LDL, calculated 37.2 <100 MG/DL VLDL, calculated 20.8 6.0 - 23.0 MG/DL  
 CHOL/HDL Ratio 3.3 HEMOGLOBIN A1C WITH EAG Collection Time: 02/20/19 10:40 PM  
Result Value Ref Range Hemoglobin A1c <3.5 (L) 4.8 - 6.0 % Est. average glucose Cannot be calculated mg/dL MAGNESIUM Collection Time: 02/20/19 10:40 PM  
Result Value Ref Range Magnesium 1.1 (LL) 1.8 - 2.4 mg/dL POC G3 Collection Time: 02/20/19 10:44 PM  
Result Value Ref Range Device: VENT    
 FIO2 (POC) 100 % pH (POC) 7.320 (L) 7.35 - 7.45    
 pCO2 (POC) 37.8 35 - 45 MMHG  
 pO2 (POC) 545 (H) 75 - 100 MMHG  
 HCO3 (POC) 19.5 (L) 22 - 26 MMOL/L  
 sO2 (POC) 100 (H) 95 - 98 % Base deficit (POC) 6 mmol/L Mode Pressure regulated volume control Tidal volume 450 ml  Set Rate 15 bpm  
 PEEP/CPAP (POC) 8 cmH2O  
 Allens test (POC) NOT APPLICABLE Inspiratory Time 0.9 sec Site DRAWN FROM ARTERIAL LINE Patient temp. 98.6 Specimen type (POC) ARTERIAL Performed by PhillipsLeannaRT   
 CO2, POC 21 MMOL/L Exhaled minute volume 7.20 L/min COLLECT TIME 2,245 GLUCOSE, POC Collection Time: 02/21/19 12:16 AM  
Result Value Ref Range Glucose (POC) 164 (H) 65 - 100 mg/dL CK Collection Time: 02/21/19 12:26 AM  
Result Value Ref Range CK 2,191 (H) 21 - 215 U/L  
LACTIC ACID Collection Time: 02/21/19 12:26 AM  
Result Value Ref Range Lactic acid 1.1 0.4 - 2.0 MMOL/L  
EKG, 12 LEAD, INITIAL Collection Time: 02/21/19  1:18 AM  
Result Value Ref Range Ventricular Rate 77 BPM  
 Atrial Rate 77 BPM  
 P-R Interval 142 ms QRS Duration 88 ms Q-T Interval 532 ms QTC Calculation (Bezet) 602 ms Calculated P Axis 64 degrees Calculated R Axis 87 degrees Calculated T Axis -104 degrees Diagnosis Normal sinus rhythm Septal infarct , age undetermined T wave abnormality, consider inferior ischemia T wave abnormality, consider anterolateral ischemia Prolonged QT Abnormal ECG When compared with ECG of 20-FEB-2019 17:41, 
Previous ECG has undetermined rhythm, needs review QT has lengthened POC G3 Collection Time: 02/21/19  3:15 AM  
Result Value Ref Range Device: VENT    
 FIO2 (POC) 50 % pH (POC) 7.356 7.35 - 7.45    
 pCO2 (POC) 33.1 (L) 35 - 45 MMHG  
 pO2 (POC) 218 (H) 75 - 100 MMHG  
 HCO3 (POC) 18.5 (L) 22 - 26 MMOL/L  
 sO2 (POC) 100 (H) 95 - 98 % Base deficit (POC) 6 mmol/L Mode Pressure regulated volume control Tidal volume 450 ml Set Rate 15 bpm  
 PEEP/CPAP (POC) 8 cmH2O Allens test (POC) NOT APPLICABLE Inspiratory Time 0.9 sec Site DRAWN FROM ARTERIAL LINE Patient temp. 98.6 Specimen type (POC) ARTERIAL Performed by PhillipsLeannaRT   
 CO2, POC 20 MMOL/L Exhaled minute volume 7.20 L/min COLLECT TIME 310 CBC W/O DIFF Collection Time: 02/21/19  3:17 AM  
Result Value Ref Range WBC 20.7 (H) 4.3 - 11.1 K/uL  
 RBC 2.84 (L) 4.05 - 5.2 M/uL HGB 9.0 (L) 11.7 - 15.4 g/dL HCT 27.4 (L) 35.8 - 46.3 % MCV 96.5 79.6 - 97.8 FL  
 MCH 31.7 26.1 - 32.9 PG  
 MCHC 32.8 31.4 - 35.0 g/dL  
 RDW 14.3 11.9 - 14.6 % PLATELET 324 503 - 295 K/uL MPV 10.2 9.4 - 12.3 FL ABSOLUTE NRBC 0.00 0.0 - 0.2 K/uL METABOLIC PANEL, BASIC Collection Time: 02/21/19  3:17 AM  
Result Value Ref Range Sodium 147 (H) 136 - 145 mmol/L Potassium 3.2 (L) 3.5 - 5.1 mmol/L Chloride 118 (H) 98 - 107 mmol/L  
 CO2 18 (L) 21 - 32 mmol/L Anion gap 11 7 - 16 mmol/L Glucose 148 (H) 65 - 100 mg/dL BUN 42 (H) 6 - 23 MG/DL Creatinine 2.32 (H) 0.6 - 1.0 MG/DL  
 GFR est AA 28 (L) >60 ml/min/1.73m2 GFR est non-AA 23 (L) >60 ml/min/1.73m2 Calcium 6.8 (L) 8.3 - 10.4 MG/DL MAGNESIUM Collection Time: 02/21/19  3:17 AM  
Result Value Ref Range Magnesium 4.0 (H) 1.8 - 2.4 mg/dL PROTHROMBIN TIME + INR Collection Time: 02/21/19  3:17 AM  
Result Value Ref Range Prothrombin time 13.8 11.7 - 14.5 sec INR 1.1 TROPONIN I Collection Time: 02/21/19  3:17 AM  
Result Value Ref Range Troponin-I, Qt. 4.81 (HH) 0.02 - 0.05 NG/ML  
HEPATIC FUNCTION PANEL Collection Time: 02/21/19  3:17 AM  
Result Value Ref Range Protein, total 5.3 (L) 6.3 - 8.2 g/dL Albumin 2.7 (L) 3.5 - 5.0 g/dL Globulin 2.6 2.3 - 3.5 g/dL A-G Ratio 1.0 (L) 1.2 - 3.5 Bilirubin, total 0.3 0.2 - 1.1 MG/DL Bilirubin, direct <0.1 <0.4 MG/DL Alk. phosphatase 92 50 - 136 U/L  
 AST (SGOT) 131 (H) 15 - 37 U/L  
 ALT (SGPT) 77 (H) 12 - 65 U/L  
GLUCOSE, POC Collection Time: 02/21/19  5:34 AM  
Result Value Ref Range Glucose (POC) 160 (H) 65 - 100 mg/dL PROCALCITONIN Collection Time: 02/21/19  6:09 AM  
Result Value Ref Range Procalcitonin 0.7 ng/mL Ambulation: Activity and Safety Activity Level: Bed Rest 
Ambulate: No (Comment) Activity: In bed, Resting quietly Activity Assistance: Complete care Mode of Transportation: Stretcher, Oxygen, IV equipment Repositioned: Head of bed elevated (degrees) Patient Turned: Turn on right side Head of Bed Elevated: HOB 30 Assistive Device: Fall prevention device Safety Measures: Bed/Chair alarm on, Bed/Chair-Wheels locked, Bed in low position, Side rails X 3 Impression/Plan:  
 
Principal Problem: 
  Nontraumatic subarachnoid hemorrhage from right middle cerebral artery ([facility] (2/20/2019) Active Problems: 
  Acute cystitis without hematuria (2/21/2019) Pneumonia of left lung due to infectious organism (2/21/2019) Nontraumatic subcortical hemorrhage of right cerebral hemisphere ([facility] (2/21/2019) Communicating hydrocephalus (2/21/2019) Elevated troponin (2/21/2019) R MCA Aneurysm admitted under SAH protocol; Hoang Mcdaniel 4, Macias Grade 4 Recommendations: Continue Acute Rehab Program 
Coordination of rehab/medical care Counseling of PM & R care issues management Monitoring and management of medical conditions per plan of care/orders  
-surprisingly , neuro non focal; PT/OT/ST when stable medically 
-MAP goal 70-90, cardene gtt prn. SBP currently 90s; 2DECHO pending 
-Rhabdo; improving with IVFs, likely cause for elevated troponin, CK and DONIS 
-Pneumonia; ? Aspiration. On broad spectrum ABX. + leukocytosis; monitor closely. As risk for decompensation.  
-cont Nimotop to prevent vasospasm, monitor; Pending IR Embolization by Dr Merrill 
-Beti Carrillo Discussion with Staff; no family present Reviewed Therapies/Labs/Meds/Records Signed By:  Rafael Fitch MD   
 February 21, 2019

## 2019-02-21 NOTE — PROGRESS NOTES
NS IV bolus from [facility] complete - see MAR for any med changes

## 2019-02-21 NOTE — PROGRESS NOTES
SPEECH PATHOLOGY NOTE: 
 
Speech therapy consult received and appreciated. Patient currently on ventilator support and unable to participate in dysphagia assessment. Speech to follow from a distance and will initiate evaluation as medical status improves.   
 
Ellyn Hernández, [facility], [facility] MARGARETTE PABLO REN, [facility]

## 2019-02-21 NOTE — PROGRESS NOTES
Sister, Ivy Parent at bedside with neighbor, Jayan Wood. Attempted to get sister to sign procedure consent for, but sister nodded off multiple times during process, unable to have consent signed at this time.

## 2019-02-21 NOTE — PROGRESS NOTES
Problem: Nutrition Deficit Goal: *Optimize nutritional status Nutrition: 
Nutrition Consult for General Nutrition Management & Supplements. (Dr. August Zavala) Assessment: Anthropometrics: Ht - %'7\", wgt - 54.6 kg (ICU bed 2/20/19), BMI 18.8 c/w acceptable weight, edema - none reported. Macronutrient Needs: 
Estimated calorie needs - 0383-3197 melanie/day (25-28 melanie/kg/day) Estimated protein needs - 44-66 gm pro/day (0.8-1.2 gm pro/kgIBW/day) (GFR 23 ml/min) Max CHO/day - 193 gm CHO/day (50% melanie/day) Fluid/day - 1.4-1.6 liters/day (1 ml/melanie/day) Intake/Comparative Standards: The patient is currently NPO which meets 0% of her calorie and 0% of her protein needs. Pertinent Labs/Hemodynamic Parameters: 
 Sodium 147, potassium 3.2, BUN 42, creatinine 2.32; POC glucose 164,160,162; MAP - 80. Pertinent Medications/Drips: SSI coverage; Cardene drip; IVF 0.45% NaCl infusion @ 125 ml/hr. GI Function/Activity: 
 Soft abdomen, hypoactive bowel sounds, last bowel movement unknown. Diet: 
 NPO. Enteral Access: 
 NGT. Food/Nutrition History: 
 61year old HIV+ lady with a h/o CKC admitted with SAH from right MCA aneurysm rupture. No family is present and the patient is unable to be interviewed . A review of her EMR reveals stable weight for the last 12-18 months. Diagnosis (Nutrition): Inadequate energy intake related to NPO status as evidenced by the patient being intubated and ventilated and requires TF for nutrition support. Intervention: 
Meals and Snacks: NPO. Enteral Nutrition: Start TF with Osmolite 1.2 @ 20 ml/hr with a 20 ml/hr water flush via NGT. Increase TF as tolerated to the goal rate of 50 ml/hr - 1440 calories/day (100% calorie goal), 67 grams protein/day (100% protein goal), 190 grams CHO/day (does not exceed max CHO limit) and 1464 ml water/day (100% fluid goal). Noted plan for OR tomorrow. IV Fluid: Continue current IVF as ordered. Mineral Supplement Therapy: Nutrition Support Orders for Electrolyte Management Replacements are activated and placed on the MAR. Coordination of Nutrition Care by a Nutrition Professional: AM ICU rounds, collaboration with Marlene Sinclair, [facility]AG[facility]. Nutrition Discharge Plan: Too soon to determine. [address] [place]. [place] [employer] 
[phone]

## 2019-02-21 NOTE — PROCEDURES
Central Venous Catheter (CVC, Central Line) Placement Date: 2-20-19 Time: 2100 Indication: Hemodynamic monitoring/Intravenous access Attending: Dr. Asad Abraham. A time-out was completed verifying correct patient, procedure, site, positioning, and special equipment if applicable. The patient was placed in a dependent position appropriate for central line placement based on the vein to be cannulated. The patients left neck was prepped and draped in sterile fashion. 1% Lidocaine was used to anesthetize the surrounding skin area. A Quad lumen  Cordis catheter was introduced into the the nternal jugular vein using the Seldinger technique and under ultrasound guidance. The catheter was threaded smoothly over the guide wire and appropriate blood return was obtained. Each lumen of the catheter was evacuated of air and flushed with sterile saline. The catheter was then sutured in place to the skin and a sterile dressing applied. Perfusion to the extremity distal to the point of catheter insertion was checked and found to be adequate. .  
 
Estimated Blood Loss: 10cc The patient tolerated the procedure well and there were no complications. Attempted x2 to place RIJ, wire would not thread and attempt aborted. Pt tolerated well.

## 2019-02-21 NOTE — PROGRESS NOTES
Pt arrived via stretcher, intubated manually bagged, hooked up to ventilator prvc 100% FIO2 upon arrival. Pt responds to voice and pain, pt became agitated propofol gtt titrated. Lung sounds coarse. NGT to L nare, clamped. A febrile. Upon skin assessment no remarkable findings, alyven applied.

## 2019-02-21 NOTE — PROGRESS NOTES
Ventilator check complete; patient has a #7.5 ET tube secured at the 22 at the teeth. Patient is not sedated. Patient is not able to follow commands. Breath sounds are diminished. Trachea is midline, Negative for subcutaneous air, and chest excursion is symmetric. Patient is also Negative for cyanosis and is Negative for pitting edema. All alarms are set and audible. Resuscitation bag is at the head of the bed. Ventilator Settings Mode FIO2 Rate Tidal Volume Pressure PEEP I:E Ratio PRVC  100 %   15 450 ml     8 cm H20 Peak airway pressure: 23 cm H2O Minute ventilation: 7.5 l/min ABG: No results for input(s): PH, PCO2, PO2, HCO3 in the last 72 hours.  
 
 
Duglas Simpson, RT

## 2019-02-22 NOTE — PROGRESS NOTES
Bedside, Verbal and Written shift change report given to Andreina Clemente RN (oncoming nurse) by NAGA Angel (offgoing nurse). Report included the following information SBAR, Kardex, Intake/Output, Recent Results and Alarm Parameters . Dual neuro assessment performed.

## 2019-02-22 NOTE — PROGRESS NOTES
TRANSFER - IN REPORT: 
 
Verbal report received from Yobani Ramirez RN (name) on [facility]  being received from ICU (unit) for routine progression of care Report consisted of patients Situation, Background, Assessment and  
Recommendations(SBAR). Information from the following report(s) SBAR, Kardex, ED Summary, Intake/Output, MAR, Recent Results, Med Rec Status and Cardiac Rhythm NSR was reviewed with the receiving nurse. Opportunity for questions and clarification was provided. Assessment completed upon patients arrival to unit and care assumed.

## 2019-02-22 NOTE — PROGRESS NOTES
Physical Therapy Note: 
 
Attended IDT rounds, per rounds patient going for neuro procedure today. Will hold and attempt another day as patient is able. Thank you, DEVENDRA LeaT

## 2019-02-22 NOTE — PROGRESS NOTES
Spoke with Mayra Miller NP about pt. To recheck H+H after 1st unit of blood is completed. Also to continue Q6 Na checks and 3% NS boluses. OK to continue holding HIV meds d/t inability to crush meds. RT to complete ABG.

## 2019-02-22 NOTE — PROGRESS NOTES
A follow up visit was made to the patient. Emotional support, spiritual presence and  
prayer were provided.  met her sister, Veto. Veto told the  that their father was coming up from [place] to visit. His first name is Kristian Nageotte. Franky Burton

## 2019-02-22 NOTE — PROGRESS NOTES
Ventilator check complete; patient has a #7.5 ET tube secured at the 22 at the teeth. Patient is not sedated. Patient is able to follow commands. Breath sounds are diminished. Trachea is midline, Negative for subcutaneous air, and chest excursion is symmetrical. Patient is also Negative for cyanosis. All alarms are set and audible. Resuscitation bag is at the head of the bed. Ventilator Settings Mode FIO2 Rate Tidal Volume Pressure PEEP I:E Ratio PRVC  50 %  15  450 ml     8 cm H20  1:3.4 Peak airway pressure: 23 cm H2O Minute ventilation: 7.9 l/min ABG: No results for input(s): PH, PCO2, PO2, HCO3 in the last 72 hours. [facility]

## 2019-02-22 NOTE — PROGRESS NOTES
Called placed to Σ[place]ΤΙ, NP. Spoke with Neeru Naik RN. Notified of slightly more lethargy this afternoon upon returning from CT. No other neuro deficits noted, patient just more difficult to arouse requiring constant stimulation to follow commands. Patient also slightly hypotensive; MAP 78-94, systolic BP 53O after nimodipine given. Maine Anderson and [facility], RNs to come to bedside to assess. Order received to administer 1000 mL NS bolus over 1 hour. Nimodipine decreased, verbal order received to monitor BP closely and if drops after next nimodipine dose- call ΣΑΡΑΝΤΙ with changes.

## 2019-02-22 NOTE — PROGRESS NOTES
TRANSFER - OUT REPORT: 
 
Verbal report given to Frank Baldwin RN (name) on Zara Wright  being transferred to ICU room [id number] [id number] (unit) for routine progression of care Report consisted of patients Situation, Background, Assessment and  
Recommendations(SBAR). Information from the following report(s) SBAR, Kardex, OR Summary, Intake/Output, MAR, Recent Results, Med Rec Status, Cardiac Rhythm NSR and Alarm Parameters  was reviewed with the receiving nurse. Lines:  
Quad Lumen 02/20/19 Left Internal jugular (Active) Central Line Being Utilized Yes 2/22/2019  7:10 AM  
Criteria for Appropriate Use Hemodynamically unstable, requiring monitoring lines, vasopressors, or volume resuscitation 2/22/2019  7:10 AM  
Site Assessment Clean, dry, & intact 2/22/2019  7:10 AM  
Infiltration Assessment 0 2/22/2019  7:10 AM  
Affected Extremity/Extremities Color distal to insertion site pink (or appropriate for race); Pulses palpable;Range of motion performed 2/22/2019  7:10 AM  
Date of Last Dressing Change 02/20/19 2/22/2019  7:10 AM  
Dressing Status Clean, dry, & intact 2/22/2019  7:10 AM  
Dressing Type Disk with Chlorhexadine gluconate (CHG); Transparent;Tape 2/22/2019  7:10 AM  
Proximal Hub Color/Line Status White;Patent; Infusing 2/22/2019  7:10 AM  
Positive Blood Return (Medial Site) Yes 2/21/2019  7:18 PM  
Medial 1 Hub Color/Line Status Gray;Patent; Infusing 2/22/2019  7:10 AM  
Positive Blood Return (Lateral Site) Yes 2/21/2019  7:18 PM  
Medial 2 Hub Color/Line Status Blue;Patent; Infusing 2/22/2019  7:10 AM  
Positive Blood Return (Site #3) Yes 2/21/2019  7:18 PM  
Distal Hub Color/Line Status Brown;Patent; Infusing 2/22/2019  7:10 AM  
Positive Blood Return (Site #4) Yes 2/21/2019  7:18 PM  
Alcohol Cap Used No 2/22/2019  7:10 AM  
   
Arterial Line 02/20/19 Left Radial artery (Active) Site Assessment Intact;Drainage (comment) 2/22/2019  2:41 PM  
Dressing Status Intact 2/22/2019  2:41 PM  
 Dressing Type Tape;Transparent 2/22/2019  2:41 PM  
Line Status Intact and in place 2/22/2019  2:41 PM  
Treatment Zeroed or re-zeroed 2/22/2019  2:41 PM  
Affected Extremity/Extremities Color distal to insertion site pink (or appropriate for race); Pulses palpable;Range of motion performed 2/22/2019  7:10 AM  
  
 
Opportunity for questions and clarification was provided. Patient transported with: 
 Monitor O2 @ 15 liters Registered Nurse Right groin site dual assessed, dressing CDI, no s/s of bleeding/bruising noted, pulses palpable and regular, vascular checks WDL. Diprivan stopped for full neuro assessment. Neosynephrine stopped. GOAL MAP 70-90 per Dr. Sara Silva. VSS. NAD. Blood infusing and dual verified.

## 2019-02-22 NOTE — OP NOTES
Procedure: Cerebral angiogram 
Surgeon: Dr. Rommel More Assistant: Joey Arceo NP 
Pre-op Dx: SAH from large right MCA aneurysm Post-op Dx: s/p coiling of right MCA aneurysm Anesthesia: Conscious sedation with fentanyl and versed Complications: None Findings: Filling at base of aneurysm where the M2 branches arise - no other filling noted

## 2019-02-22 NOTE — PROGRESS NOTES
Progress Note Patient: Ana Fernandez MRN: [id number]  SSN: [id number] YOB: 1960  Age: 60 y.o. Sex: female Admit Date: 2/20/2019 LOS: 2 days Subjective:  
 
Pt remains intubated and minimal sedation. Will wake and follow commands, keenan. Family updated at bedside. Consent obtained for coiling of R MCA ruptured aneurysm. Current Facility-Administered Medications Medication Dose Route Frequency  heparin (porcine) injection 5,000 Units  5,000 Units SubCUTAneous Q12H  
 lansoprazole (PREVACID SOLUTAB) disintegrating tablet 30 mg  30 mg Per NG tube ACB  magnesium oxide (MAG-OX) tablet 400 mg  400 mg Per NG tube BID  senna-docusate (PERICOLACE) 8.6-50 mg per tablet 2 Tab  2 Tab Per NG tube QHS  venlafaxine (EFFEXOR) tablet 50 mg  50 mg Per NG tube TID WITH MEALS  abacavir (ZIAGEN) 20 mg/mL oral solution 300 mg  300 mg Per G Tube Q12H  
 PHENYLephrine (PF)(ROBI-SYNEPHRINE) 30 mg in 0.9% sodium chloride 250 mL infusion   mcg/min IntraVENous TITRATE  
 NOREPINephrine (LEVOPHED) 4 mg/250 mL (16 mcg/mL) in NS infusion  0-16 mcg/min IntraVENous TITRATE  cefepime (MAXIPIME) 1 g in 0.9% sodium chloride (MBP/ADV) 50 mL ADV  1 g IntraVENous Q24H  
 insulin lispro (HUMALOG) injection   SubCUTAneous Q6H  
 acetaminophen (TYLENOL) tablet 650 mg  650 mg Oral Q4H PRN  
 0.45% sodium chloride infusion  125 mL/hr IntraVENous CONTINUOUS  Vancomycin Intermittent Therapy--Pharmacy to Dose   Other Rx Dosing/Monitoring  polyvinyl alcohol (LIQUIFILM TEARS) 1.4 % ophthalmic solution 1 Drop  1 Drop Both Eyes PRN  niMODipine (NIMOTOP) 30 mg/mL oral solution (compound) 30 mg  30 mg Oral Q4H  
 NUTRITIONAL SUPPORT ELECTROLYTE PRN ORDERS   Does Not Apply PRN  
 3% sodium chloride (*HIGH ALERT*CONCENTRATED IV*) bolus infusion 250 mL  250 mL IntraVENous Q6H  
 darunavir ethanolate (PREZISTA) tablet 800 mg  800 mg Oral 7am  
  dolutegravir (TIVICAY) tablet 50 mg  50 mg Oral 7am  
 ritonavir (NORVIR) tablet 100 mg  100 mg Oral 7am  
 ondansetron (ZOFRAN) injection 4 mg  4 mg IntraVENous Q6H PRN  niCARdipine in Saline (CARDENE) 0.1mg/mL 200 ml premix infusion  0-15 mg/hr IntraVENous TITRATE  bisacodyl (DULCOLAX) tablet 5 mg  5 mg Oral DAILY PRN  propofol (DIPRIVAN) infusion  0-50 mcg/kg/min IntraVENous TITRATE  fentaNYL citrate (PF) injection 50 mcg  50 mcg IntraVENous Q2H PRN Objective:  
 
Vitals:  
 02/22/19 0745 02/22/19 0800 02/22/19 0801 02/22/19 1493 BP:   111/60 Pulse: 66 72  66 Resp: 16 18  16 Temp:   99.1 °F (37.3 °C) SpO2: 100% 100%  100% Weight:      
  
  
Intake and Output: 
Current Shift: 02/22 0701 - 02/22 1900 In: 76 Out: 395 [BFONP:675] Last 24 hr: 02/21 0701 - 02/22 0700 In: 4158.4 [I.V.:3486.4] Out: 1250 [ZUMQB:1057] IO: +2908/24hr TOTAL: +2500 overall. Physical Exam:  
General:  Pt intubated, follows commands. Eyes:  PERRL+3. Neck: Supple, symmetrical, trachea midline, no adenopathy, thyroid: no enlargment/tenderness/nodules, no carotid bruit and no JVD. Lungs:   bilat rhonci. +secretions. Heart:  Regular rate and rhythm, S1, S2 normal, no murmur, click, rub or gallop. Abdomen:   Soft, non-tender. Bowel sounds normal. No masses,  No organomegaly. Extremities: Extremities normal, atraumatic, no cyanosis or edema. Pulses: 2+ and symmetric all extremities. Skin: Skin color, texture, turgor normal. No rashes or lesions Neurologic: Pt intubated with minimal sedation. Will wake and follow commands, keenan. Lab/Data Review: 
 
Recent Results (from the past 12 hour(s)) GLUCOSE, POC Collection Time: 02/22/19 12:45 AM  
Result Value Ref Range Glucose (POC) 153 (H) 65 - 100 mg/dL POC G3 Collection Time: 02/22/19  2:46 AM  
Result Value Ref Range Device: VENT    
 FIO2 (POC) 40 %  pH (POC) 7.353 7.35 - 7.45    
 pCO2 (POC) 29.0 (L) 35 - 45 MMHG  
 pO2 (POC) 199 (H) 75 - 100 MMHG  
 HCO3 (POC) 16.2 (L) 22 - 26 MMOL/L  
 sO2 (POC) 100 (H) 95 - 98 % Base deficit (POC) 9 mmol/L Mode Pressure regulated volume control Tidal volume 450 ml Set Rate 15 bpm  
 PEEP/CPAP (POC) 8 cmH2O Allens test (POC) NOT APPLICABLE Inspiratory Time 0.9 sec Site DRAWN FROM ARTERIAL LINE Patient temp. 98.6 Specimen type (POC) ARTERIAL Performed by Neena   
 CO2, POC 17 MMOL/L Exhaled minute volume 10.20 L/min COLLECT TIME 244 CBC W/O DIFF Collection Time: 02/22/19  3:16 AM  
Result Value Ref Range WBC 13.5 (H) 4.3 - 11.1 K/uL  
 RBC 2.34 (L) 4.05 - 5.2 M/uL HGB 7.3 (L) 11.7 - 15.4 g/dL HCT 23.6 (L) 35.8 - 46.3 % .9 (H) 79.6 - 97.8 FL  
 MCH 31.2 26.1 - 32.9 PG  
 MCHC 30.9 (L) 31.4 - 35.0 g/dL  
 RDW 14.6 11.9 - 14.6 % PLATELET 272 (L) 385 - 450 K/uL MPV 9.9 9.4 - 12.3 FL ABSOLUTE NRBC 0.00 0.0 - 0.2 K/uL METABOLIC PANEL, BASIC Collection Time: 02/22/19  3:16 AM  
Result Value Ref Range Sodium 149 (H) 136 - 145 mmol/L Potassium 3.0 (L) 3.5 - 5.1 mmol/L Chloride 120 (H) 98 - 107 mmol/L  
 CO2 17 (L) 21 - 32 mmol/L Anion gap 12 7 - 16 mmol/L Glucose 123 (H) 65 - 100 mg/dL BUN 36 (H) 6 - 23 MG/DL Creatinine 2.20 (H) 0.6 - 1.0 MG/DL  
 GFR est AA 29 (L) >60 ml/min/1.73m2 GFR est non-AA 24 (L) >60 ml/min/1.73m2 Calcium 7.0 (L) 8.3 - 10.4 MG/DL MAGNESIUM Collection Time: 02/22/19  3:16 AM  
Result Value Ref Range Magnesium 2.7 (H) 1.8 - 2.4 mg/dL Gray Roman Collection Time: 02/22/19  3:16 AM  
Result Value Ref Range Vancomycin, random 12.8 UG/ML  
TROPONIN I Collection Time: 02/22/19  3:16 AM  
Result Value Ref Range Troponin-I, Qt. 1.34 (HH) 0.02 - 0.05 NG/ML  
GLUCOSE, POC Collection Time: 02/22/19  5:36 AM  
Result Value Ref Range Glucose (POC) 131 (H) 65 - 100 mg/dL SODIUM  
 Collection Time: 19  9:20 AM  
Result Value Ref Range Sodium 149 (H) 136 - 145 mmol/L  
TROPONIN I Collection Time: 19  9:20 AM  
Result Value Ref Range Troponin-I, Qt. 1.10 (HH) 0.02 - 0.05 NG/ML Assessment/ Plan:  
 
Principal Problem: 
  Nontraumatic subarachnoid hemorrhage from right middle cerebral artery ([address] (2019) Active Problems: 
  Acute cystitis without hematuria (2019) Pneumonia of left lung due to infectious organism (2019) Nontraumatic subcortical hemorrhage of right cerebral hemisphere ([address] (2019) Communicating hydrocephalus (2019) Elevated troponin (2019) Cerebral edema ([address] (2019) NEURO:Pt is SAH with IVH/R ICH admitted to ICU. She is on [facility] protocol - nimotop, Mg, lipitor. Hoang Mcdaniel 4, Macias  Grade 4. CT head shows right frontal ICH and IVH with extensive SAH from a large right MCA aneurysm. CTA shows bilateral MCA mirror aneurysms, with the right one being larger and the one that bled with the [address] being right at the dome of the right aneurysm. Repeat CT head this am showed stable ICH and cerebral edema. Family updated on pt condition and seriousness of her condition. I explained the procedure and the risks in depth including bleeding and death. Consent was obtained for the procedure. RESP: Pt intubated with 7.5 OETT, 22 at teeth, intact with breath sound bilat. No distress. Chest CT done upon arrival: MERY, density LLL and small left pleural effusion. Pt on PRVC @ 40%, P8, R15: AB.3/29/199. CXR clear this am. 
CV:MAP GOAL 70-90, cardene gtt prn, 2D Echo: EF 40-45%, stress induced cardiomyopathy consistent with Takosubo. Trop peaked at 6.4, has trended back down, last one 1.4. HEME: HH 7.3/23 . Started on Sq hep yesterday, will send HIT panel and monitor. Decreased heparin 5000 units to bid. NEPH: bun/cr: 36/2.2  (hx of CKD), has improved. On  NS. GI:NPO, pepcid, TF started with nutrition consult, NPO for procedure today. Cont monitor. Pepcid. ID: TM: [id number]. CT chest shows MERY pneumonia, will castro cx and start cefepime/vanc D3/10, cx finals still pending. Neg thus far. LINES: Left IJ quad, left art line. Mi. Patient is critical due to [facility], possibility of re-rupture, ICH, hydrocephalus, and respiratory failure. I spent 37 minutes at bedside assessing the patient, looking at labs, reviewing films, correcting care, and updating the family. Signed By: Michelet Contreras NP February 22, 2019

## 2019-02-22 NOTE — PROGRESS NOTES
Problem: Nutrition Deficit Goal: *Optimize nutritional status Nutrition F/U: 
TF Management for Dr. Sara Silva. Assessment: 
Weight 54.6 kg (ICU bed 2/20/19), edema - scleral. 
The patient remains intubated, ventilated, sedated and NPO post-op coiling of right MCA aneurysm today. TF was started last evening, then held at midnight for procedure today. Expect that it could be resumed later today. Labs are remarkable for sodium 149, potassium 3 and creatinine 2.2; POC glucoses 131-164. Enteral Access: 
 NGT. Macronutrient Needs: 
Estimated calorie needs - 0577-9657 melanie/day (25-28 melanie/kg/day) Estimated protein needs - 44-66 gm pro/day (0.8-1.2 gm pro/kgIBW/day) (GFR 24 ml/min) Max CHO/day - 193 gm CHO/day (50% melanie/day) Fluid/day - 1.4-1.6 liters/day (1 ml/melanie/day) Intake/Comparative Standards: The patient is currently NPO which meets 0% of her calorie and 0% of her protein needs. Intervention:  
Meals and Snacks: NPO. Enteral Nutrition: Resume TF and water flush when OK with MD and advance to the goal rate as tolerated. Mineral Supplement Therapy: Nutrition Support Orders/Electrolyte Management Replacement Protocols are active on the MAR. Coordination of Nutrition Care by a Nutrition Professional: AM ICU rounds, collaboration with Delores Gutiérrez RN. Nutrition Discharge Plan: Too soon to determine. Constance Conrad. [facility] 
[phone]

## 2019-02-22 NOTE — PROGRESS NOTES
Blood consent obtained from family member present in ICU. Blood collected for Type and Cross and sent to lab.

## 2019-02-22 NOTE — PROGRESS NOTES
Ventilator check complete; patient has a #7.5 ET tube secured at the 22 at the teeth. Patient is sedated. Patient is not able to follow commands. Breath sounds are coarse. Trachea is midline, Negative for subcutaneous air, and chest excursion is symmetric. Patient is also Negative for cyanosis and is Negative for pitting edema. All alarms are set and audible. Resuscitation bag is at the head of the bed. Ventilator Settings Mode FIO2 Rate Tidal Volume Pressure PEEP I:E Ratio PRVC  40 %   15 450 ml     8 cm H20  1:3.4 Peak airway pressure: 15 cm H2O Minute ventilation: 9.3 l/min [address], RT

## 2019-02-22 NOTE — PROGRESS NOTES
Patient to [facility] for procedure. Patient's VS being actively monitored by Endovascular RN's; Patient has arterial line in place for realtime monitoring of VS.

## 2019-02-22 NOTE — PROGRESS NOTES
Ventilator check complete; patient has a #7.5 ET tube secured at the 22 at the teeth. Patient is sedated. Patient is able to follow commands. Breath sounds are coarse. Trachea is midline, Negative for subcutaneous air, and chest excursion is symmetric. Patient is also Negative for cyanosis and is Negative for pitting edema. All alarms are set and audible. Resuscitation bag is at the head of the bed. Ventilator Settings Mode FIO2 Rate Tidal Volume Pressure PEEP I:E Ratio PRVC  40 %   15 breaths/m 450 ml     8 cm H20  1:3.4 Peak airway pressure: 16 cm H2O Minute ventilation: 7.8 l/min [facility] 18

## 2019-02-22 NOTE — PROGRESS NOTES
Pt returned from IR with RNx2, RT at bedside to place pt back on vent. Propofol stopped for accurate neuro assessment. R groin site c/d/i at this time. Pt lying flat. VSS.

## 2019-02-22 NOTE — INTERDISCIPLINARY ROUNDS
Interdisciplinary team rounds were held 2/22/2019 with the following team members:Care Management, Nursing, Nurse Practitioner, Nutrition, Pastoral Care, Pharmacy, Physical Therapy, Physician, Respiratory Therapy and Clinical Coordinator. Plan of care discussed. See clinical pathway and/or care plan for interventions and desired outcomes.

## 2019-02-22 NOTE — PROGRESS NOTES
PM&R Consult Progress Note Patient: Porfirio Kahn Admit Date: 2/20/2019 Admit Diagnosis: SAH (subarachnoid hemorrhage) ([facility] [address] [I60.9] Recommendations: Continue Acute Rehab Program, Coordination of rehab/medical care, Counseling of PM & R care issues management 
- remains on vent pending L MCA coiling today. Pt with multiple medical comorbidities. Hi risk of complications. -PT/OT/ST when able to participate 
-Hgb 7.3, plts 122, + leukocytosis, hypokalemia, DONIS on CKD; HIT profile ordered. May need transfusion. ? Hemoccult stool. Cont GI prophylaxis History/Subjective/Complaint:  
 
Patient seen and examined. Records reviewed. Asleep on ventilator Pain 1 Pain Scale 1: Adult Nonverbal Pain Scale (02/22/19 0710) Pain Intensity 1: 0 (02/22/19 0710) Patient Stated Pain Goal: Unable to verbalize/indicate pain (02/22/19 0710) Pain Reassessment 1: Patient sleeping (02/21/19 1149) Pain Intervention(s) 1: Medication (see MAR) (02/20/19 2150) Objective:  
 
Vitals: 
Patient Vitals for the past 8 hrs: 
 BP Temp Pulse Resp SpO2  
02/22/19 0723   65 17 100 % 02/22/19 0720 154/68  68    
02/22/19 0710 128/66 99.3 °F (37.4 °C) 71 28 100 % 02/22/19 0545   (!) 56 18 100 % 02/22/19 0530 114/58  61 19 100 % 02/22/19 0515   65 20 100 % 02/22/19 0500   75 20 100 % 02/22/19 0442 121/63  79 19 100 % 02/22/19 0415   70 20 100 % 02/22/19 0402   70 20 100 % 02/22/19 0400  99.7 °F (37.6 °C)     
02/22/19 0345   76 20 100 % 02/22/19 0332 154/61  67    
02/22/19 0331   66 19 100 % 02/22/19 0316   67 18 100 % 02/22/19 0302   66 16 100 % 02/22/19 0247 123/64  77 23 100 % 02/22/19 0230   68 19 100 % 02/22/19 0215 114/61  73 14 100 % 02/22/19 0201 118/60  66 19 100 % 02/22/19 0146 113/57  71 19 100 % 02/22/19 0130 107/55  75 20 100 % 02/22/19 0115 101/56  75 20 100 % 02/22/19 0101 104/55  74 18 100 % 02/22/19 0046 121/63  70 17 100 % 02/22/19 0040 141/64  72    
02/22/19 0030 122/68  62 19 100 % Intake and Output: 
02/20 1901 - 02/22 0700 In: 4775.6 [I.V.:3983.6] Out: 2340 [ULJI:[id number]] Allergies Allergen Reactions  Codeine Itching Ear itch, can't breath  Pcn [Penicillins] Rash and Itching Current Facility-Administered Medications Medication Dose Route Frequency  potassium chloride 20 mEq in 100 ml IVPB  20 mEq IntraVENous ONCE  
 heparin (porcine) injection 5,000 Units  5,000 Units SubCUTAneous Q12H  cefepime (MAXIPIME) 1 g in 0.9% sodium chloride (MBP/ADV) 50 mL ADV  1 g IntraVENous Q24H  
 insulin lispro (HUMALOG) injection   SubCUTAneous Q6H  
 pantoprazole (PROTONIX) tablet 40 mg  40 mg Oral DAILY  acetaminophen (TYLENOL) tablet 650 mg  650 mg Oral Q4H PRN  
 0.45% sodium chloride infusion  125 mL/hr IntraVENous CONTINUOUS  Vancomycin Intermittent Therapy--Pharmacy to Dose   Other Rx Dosing/Monitoring  polyvinyl alcohol (LIQUIFILM TEARS) 1.4 % ophthalmic solution 1 Drop  1 Drop Both Eyes PRN  niMODipine (NIMOTOP) 30 mg/mL oral solution (compound) 30 mg  30 mg Oral Q4H  
 NUTRITIONAL SUPPORT ELECTROLYTE PRN ORDERS   Does Not Apply PRN  
 3% sodium chloride (*HIGH ALERT*CONCENTRATED IV*) bolus infusion 250 mL  250 mL IntraVENous Q6H  
 darunavir ethanolate (PREZISTA) tablet 800 mg  800 mg Oral 7am  
 dolutegravir (TIVICAY) tablet 50 mg  50 mg Oral 7am  
 ritonavir (NORVIR) tablet 100 mg  100 mg Oral 7am  
 venlafaxine-SR (EFFEXOR-XR) capsule 150 mg  150 mg Oral DAILY  abacavir (ZIAGEN) 20 mg/mL oral solution 300 mg  300 mg Per G Tube Q12H  
 ondansetron (ZOFRAN) injection 4 mg  4 mg IntraVENous Q6H PRN  niCARdipine in Saline (CARDENE) 0.1mg/mL 200 ml premix infusion  0-15 mg/hr IntraVENous TITRATE  senna-docusate (PERICOLACE) 8.6-50 mg per tablet 2 Tab  2 Tab Oral QHS  bisacodyl (DULCOLAX) tablet 5 mg  5 mg Oral DAILY PRN  
  propofol (DIPRIVAN) infusion  0-50 mcg/kg/min IntraVENous TITRATE  fentaNYL citrate (PF) injection 50 mcg  50 mcg IntraVENous Q2H PRN  
 magnesium oxide (MAG-OX) tablet 400 mg  400 mg Oral BID Physical Exam: No significant changes 
cv rrr no m Lungs; coarse bs 
abd soft ntnd bs+ Ext no edema Neuro; asleep, did not awaken this a.m. Stable per nursing notes. Labs/Studies: 
Recent Results (from the past 72 hour(s)) EKG, 12 LEAD, INITIAL Collection Time: 02/20/19  5:41 PM  
Result Value Ref Range Ventricular Rate 76 BPM  
 Atrial Rate 159 BPM  
 QRS Duration 94 ms Q-T Interval 472 ms QTC Calculation (Bezet) 531 ms Calculated R Axis 82 degrees Calculated T Axis -129 degrees Diagnosis    
  !! AGE AND GENDER SPECIFIC ECG ANALYSIS !! Normal sinus rhythm ST & T wave abnormality, consider inferolateral ischemia Prolonged QT Abnormal ECG When compared with ECG of 01-AUG-2014 20:47, 
T wave inversion now evident in Inferior leads T wave inversion now evident in Anterolateral leads 
afib resolved Confirmed by CARLIE BRAGA (), Nathaniel Ramos ([id number]) on 2/21/2019 11:54:28 AM 
  
CBC WITH AUTOMATED DIFF Collection Time: 02/20/19  5:51 PM  
Result Value Ref Range WBC 16.5 (H) 4.3 - 11.1 K/uL  
 RBC 3.51 (L) 4.05 - 5.2 M/uL  
 HGB 10.9 (L) 11.7 - 15.4 g/dL HCT 33.5 (L) 35.8 - 46.3 % MCV 95.4 79.6 - 97.8 FL  
 MCH 31.1 26.1 - 32.9 PG  
 MCHC 32.5 31.4 - 35.0 g/dL  
 RDW 14.0 11.9 - 14.6 % PLATELET 882 044 - 412 K/uL MPV 9.9 9.4 - 12.3 FL ABSOLUTE NRBC 0.00 0.0 - 0.2 K/uL  
 DF AUTOMATED NEUTROPHILS 87 (H) 43 - 78 % LYMPHOCYTES 9 (L) 13 - 44 % MONOCYTES 4 4.0 - 12.0 % EOSINOPHILS 0 (L) 0.5 - 7.8 % BASOPHILS 0 0.0 - 2.0 % IMMATURE GRANULOCYTES 0 0.0 - 5.0 %  
 ABS. NEUTROPHILS 14.3 (H) 1.7 - 8.2 K/UL  
 ABS. LYMPHOCYTES 1.5 0.5 - 4.6 K/UL  
 ABS. MONOCYTES 0.7 0.1 - 1.3 K/UL  
 ABS. EOSINOPHILS 0.0 0.0 - 0.8 K/UL  
 ABS. BASOPHILS 0.0 0.0 - 0.2 K/UL ABS. IMM. GRANS. 0.1 0.0 - 0.5 K/UL METABOLIC PANEL, COMPREHENSIVE Collection Time: 02/20/19  5:51 PM  
Result Value Ref Range Sodium 142 136 - 145 mmol/L Potassium 4.0 3.5 - 5.1 mmol/L Chloride 109 (H) 98 - 107 mmol/L  
 CO2 20 (L) 21 - 32 mmol/L Anion gap 13 mmol/L Glucose 178 (H) 65 - 100 mg/dL BUN 53 (H) 6 - 23 MG/DL Creatinine 3.20 (H) 0.6 - 1.0 MG/DL  
 GFR est AA 19 (L) >60 ml/min/1.73m2 GFR est non-AA 16 ml/min/1.73m2 Calcium 9.2 8.3 - 10.4 MG/DL Bilirubin, total 0.4 0.2 - 1.1 MG/DL  
 ALT (SGPT) 54 12 - 65 U/L  
 AST (SGOT) 105 (H) 15 - 37 U/L Alk. phosphatase 109 50 - 130 U/L Protein, total 7.5 g/dL Albumin 3.6 3.5 - 5.0 g/dL Globulin 3.9 (H) 2.3 - 3.5 g/dL A-G Ratio 0.9 CK Collection Time: 02/20/19  5:51 PM  
Result Value Ref Range CK 1,753 (H) 21 - 215 U/L  
TROPONIN I Collection Time: 02/20/19  5:51 PM  
Result Value Ref Range Troponin-I, Qt. 6.49 (HH) 0.02 - 0.05 NG/ML  
DRUG SCREEN, URINE Collection Time: 02/20/19  6:12 PM  
Result Value Ref Range PCP(PHENCYCLIDINE) NEGATIVE BENZODIAZEPINES POSITIVE    
 COCAINE NEGATIVE AMPHETAMINES NEGATIVE METHADONE NEGATIVE     
 THC (TH-CANNABINOL) NEGATIVE     
 OPIATES POSITIVE    
 BARBITURATES NEGATIVE URINALYSIS W/ RFLX MICROSCOPIC Collection Time: 02/20/19  6:12 PM  
Result Value Ref Range Color YELLOW Appearance CLOUDY Specific gravity 1.016 1.001 - 1.023    
 pH (UA) 5.5 5.0 - 9.0 Protein 100 (A) NEG mg/dL Glucose NEGATIVE  mg/dL Ketone NEGATIVE  NEG mg/dL Bilirubin NEGATIVE  NEG Blood LARGE (A) NEG Urobilinogen 0.2 0.2 - 1.0 EU/dL Nitrites NEGATIVE  NEG Leukocyte Esterase LARGE (A) NEG    
 WBC 20-50 0 /hpf  
 RBC 0-3 0 /hpf Epithelial cells 0-3 0 /hpf Bacteria 4+ (H) 0 /hpf CULTURE, BLOOD Collection Time: 02/20/19  7:20 PM  
Result Value Ref Range Special Requests: LEFT 
HAND Culture result: NO GROWTH 2 DAYS    
POC LACTIC ACID Collection Time: 02/20/19  7:24 PM  
Result Value Ref Range Lactic Acid (POC) 2.63 (H) 0.5 - 1.9 mmol/L  
CULTURE, URINE Collection Time: 02/20/19 10:37 PM  
Result Value Ref Range Special Requests: NO SPECIAL REQUESTS Culture result: (A)    
  04701 COLONIES/mL GRAM NEGATIVE RODS SUBCULTURE IN PROGRESS  
TROPONIN I Collection Time: 02/20/19 10:40 PM  
Result Value Ref Range Troponin-I, Qt. 3.72 (HH) 0.02 - 0.05 NG/ML  
LIPID PANEL Collection Time: 02/20/19 10:40 PM  
Result Value Ref Range LIPID PROFILE Cholesterol, total 83 <200 MG/DL Triglyceride 104 35 - 150 MG/DL  
 HDL Cholesterol 25 (L) 40 - 60 MG/DL  
 LDL, calculated 37.2 <100 MG/DL VLDL, calculated 20.8 6.0 - 23.0 MG/DL  
 CHOL/HDL Ratio 3.3 HEMOGLOBIN A1C WITH EAG Collection Time: 02/20/19 10:40 PM  
Result Value Ref Range Hemoglobin A1c <3.5 (L) 4.8 - 6.0 % Est. average glucose Cannot be calculated mg/dL MAGNESIUM Collection Time: 02/20/19 10:40 PM  
Result Value Ref Range Magnesium 1.1 (LL) 1.8 - 2.4 mg/dL POC G3 Collection Time: 02/20/19 10:44 PM  
Result Value Ref Range Device: VENT    
 FIO2 (POC) 100 % pH (POC) 7.320 (L) 7.35 - 7.45    
 pCO2 (POC) 37.8 35 - 45 MMHG  
 pO2 (POC) 545 (H) 75 - 100 MMHG  
 HCO3 (POC) 19.5 (L) 22 - 26 MMOL/L  
 sO2 (POC) 100 (H) 95 - 98 % Base deficit (POC) 6 mmol/L Mode Pressure regulated volume control Tidal volume 450 ml Set Rate 15 bpm  
 PEEP/CPAP (POC) 8 cmH2O Allens test (POC) NOT APPLICABLE Inspiratory Time 0.9 sec Site DRAWN FROM ARTERIAL LINE Patient temp. 98.6 Specimen type (POC) ARTERIAL Performed by Mike   
 CO2, POC 21 MMOL/L Exhaled minute volume 7.20 L/min COLLECT TIME 2,245 GLUCOSE, POC Collection Time: 02/21/19 12:16 AM  
Result Value Ref Range Glucose (POC) 164 (H) 65 - 100 mg/dL CK  
 Collection Time: 02/21/19 12:26 AM  
Result Value Ref Range CK 2,191 (H) 21 - 215 U/L  
LACTIC ACID Collection Time: 02/21/19 12:26 AM  
Result Value Ref Range Lactic acid 1.1 0.4 - 2.0 MMOL/L  
EKG, 12 LEAD, INITIAL Collection Time: 02/21/19  1:18 AM  
Result Value Ref Range Ventricular Rate 77 BPM  
 Atrial Rate 77 BPM  
 P-R Interval 142 ms QRS Duration 88 ms Q-T Interval 532 ms QTC Calculation (Bezet) 602 ms Calculated P Axis 64 degrees Calculated R Axis 87 degrees Calculated T Axis -104 degrees Diagnosis Normal sinus rhythm Septal infarct , age undetermined T wave abnormality, consider inferior ischemia T wave abnormality, consider anterolateral ischemia Prolonged QT Abnormal ECG When compared with ECG of 20-FEB-2019 17:41, 
Previous ECG has undetermined rhythm, needs review QT has lengthened Confirmed by CARLIE BRAGA (), Brittney Torres ([id number]) on 2/21/2019 1:00:26 PM 
  
CULTURE, RESPIRATORY/SPUTUM/BRONCH W Geeta Sarabjit STAIN Collection Time: 02/21/19  2:11 AM  
Result Value Ref Range Special Requests: NO SPECIAL REQUESTS    
 GRAM STAIN PENDING Culture result:     
  NO GROWTH AFTER SHORT PERIOD OF INCUBATION. FURTHER RESULTS TO FOLLOW AFTER OVERNIGHT INCUBATION. POC G3 Collection Time: 02/21/19  3:15 AM  
Result Value Ref Range Device: VENT    
 FIO2 (POC) 50 % pH (POC) 7.356 7.35 - 7.45    
 pCO2 (POC) 33.1 (L) 35 - 45 MMHG  
 pO2 (POC) 218 (H) 75 - 100 MMHG  
 HCO3 (POC) 18.5 (L) 22 - 26 MMOL/L  
 sO2 (POC) 100 (H) 95 - 98 % Base deficit (POC) 6 mmol/L Mode Pressure regulated volume control Tidal volume 450 ml Set Rate 15 bpm  
 PEEP/CPAP (POC) 8 cmH2O Allens test (POC) NOT APPLICABLE Inspiratory Time 0.9 sec Site DRAWN FROM ARTERIAL LINE Patient temp. 98.6 Specimen type (POC) ARTERIAL Performed by Liv[place]Ekwok   
 CO2, POC 20 MMOL/L Exhaled minute volume 7.20 L/min COLLECT TIME 310 CBC W/O DIFF  
 Collection Time: 02/21/19  3:17 AM  
Result Value Ref Range WBC 20.7 (H) 4.3 - 11.1 K/uL  
 RBC 2.84 (L) 4.05 - 5.2 M/uL HGB 9.0 (L) 11.7 - 15.4 g/dL HCT 27.4 (L) 35.8 - 46.3 % MCV 96.5 79.6 - 97.8 FL  
 MCH 31.7 26.1 - 32.9 PG  
 MCHC 32.8 31.4 - 35.0 g/dL  
 RDW 14.3 11.9 - 14.6 % PLATELET 012 550 - 514 K/uL MPV 10.2 9.4 - 12.3 FL ABSOLUTE NRBC 0.00 0.0 - 0.2 K/uL METABOLIC PANEL, BASIC Collection Time: 02/21/19  3:17 AM  
Result Value Ref Range Sodium 147 (H) 136 - 145 mmol/L Potassium 3.2 (L) 3.5 - 5.1 mmol/L Chloride 118 (H) 98 - 107 mmol/L  
 CO2 18 (L) 21 - 32 mmol/L Anion gap 11 7 - 16 mmol/L Glucose 148 (H) 65 - 100 mg/dL BUN 42 (H) 6 - 23 MG/DL Creatinine 2.32 (H) 0.6 - 1.0 MG/DL  
 GFR est AA 28 (L) >60 ml/min/1.73m2 GFR est non-AA 23 (L) >60 ml/min/1.73m2 Calcium 6.8 (L) 8.3 - 10.4 MG/DL MAGNESIUM Collection Time: 02/21/19  3:17 AM  
Result Value Ref Range Magnesium 4.0 (H) 1.8 - 2.4 mg/dL PROTHROMBIN TIME + INR Collection Time: 02/21/19  3:17 AM  
Result Value Ref Range Prothrombin time 13.8 11.7 - 14.5 sec INR 1.1 TROPONIN I Collection Time: 02/21/19  3:17 AM  
Result Value Ref Range Troponin-I, Qt. 4.81 (HH) 0.02 - 0.05 NG/ML  
HEPATIC FUNCTION PANEL Collection Time: 02/21/19  3:17 AM  
Result Value Ref Range Protein, total 5.3 (L) 6.3 - 8.2 g/dL Albumin 2.7 (L) 3.5 - 5.0 g/dL Globulin 2.6 2.3 - 3.5 g/dL A-G Ratio 1.0 (L) 1.2 - 3.5 Bilirubin, total 0.3 0.2 - 1.1 MG/DL Bilirubin, direct <0.1 <0.4 MG/DL Alk. phosphatase 92 50 - 136 U/L  
 AST (SGOT) 131 (H) 15 - 37 U/L  
 ALT (SGPT) 77 (H) 12 - 65 U/L  
GLUCOSE, POC Collection Time: 02/21/19  5:34 AM  
Result Value Ref Range Glucose (POC) 160 (H) 65 - 100 mg/dL PROCALCITONIN Collection Time: 02/21/19  6:09 AM  
Result Value Ref Range Procalcitonin 0.7 ng/mL GLUCOSE, POC  Collection Time: 02/21/19 12:21 PM  
 Result Value Ref Range Glucose (POC) 162 (H) 65 - 100 mg/dL SODIUM Collection Time: 02/21/19  4:17 PM  
Result Value Ref Range Sodium 146 (H) 136 - 145 mmol/L  
TROPONIN I Collection Time: 02/21/19  4:17 PM  
Result Value Ref Range Troponin-I, Qt. 2.41 (HH) 0.02 - 0.05 NG/ML  
GLUCOSE, POC Collection Time: 02/21/19  5:38 PM  
Result Value Ref Range Glucose (POC) 141 (H) 65 - 100 mg/dL SODIUM Collection Time: 02/21/19  9:34 PM  
Result Value Ref Range Sodium 146 (H) 136 - 145 mmol/L  
TROPONIN I Collection Time: 02/21/19  9:34 PM  
Result Value Ref Range Troponin-I, Qt. 1.93 (HH) 0.02 - 0.05 NG/ML  
GLUCOSE, POC Collection Time: 02/22/19 12:45 AM  
Result Value Ref Range Glucose (POC) 153 (H) 65 - 100 mg/dL POC G3 Collection Time: 02/22/19  2:46 AM  
Result Value Ref Range Device: VENT    
 FIO2 (POC) 40 % pH (POC) 7.353 7.35 - 7.45    
 pCO2 (POC) 29.0 (L) 35 - 45 MMHG  
 pO2 (POC) 199 (H) 75 - 100 MMHG  
 HCO3 (POC) 16.2 (L) 22 - 26 MMOL/L  
 sO2 (POC) 100 (H) 95 - 98 % Base deficit (POC) 9 mmol/L Mode Pressure regulated volume control Tidal volume 450 ml Set Rate 15 bpm  
 PEEP/CPAP (POC) 8 cmH2O Allens test (POC) NOT APPLICABLE Inspiratory Time 0.9 sec Site DRAWN FROM ARTERIAL LINE Patient temp. 98.6 Specimen type (POC) ARTERIAL Performed by [facility]   
 CO2, POC 17 MMOL/L Exhaled minute volume 10.20 L/min COLLECT TIME 244 CBC W/O DIFF Collection Time: 02/22/19  3:16 AM  
Result Value Ref Range WBC 13.5 (H) 4.3 - 11.1 K/uL  
 RBC 2.34 (L) 4.05 - 5.2 M/uL HGB 7.3 (L) 11.7 - 15.4 g/dL HCT 23.6 (L) 35.8 - 46.3 % .9 (H) 79.6 - 97.8 FL  
 MCH 31.2 26.1 - 32.9 PG  
 MCHC 30.9 (L) 31.4 - 35.0 g/dL  
 RDW 14.6 11.9 - 14.6 % PLATELET 916 (L) 002 - 450 K/uL MPV 9.9 9.4 - 12.3 FL ABSOLUTE NRBC 0.00 0.0 - 0.2 K/uL METABOLIC PANEL, BASIC  Collection Time: 02/22/19  3:16 AM  
 Result Value Ref Range Sodium 149 (H) 136 - 145 mmol/L Potassium 3.0 (L) 3.5 - 5.1 mmol/L Chloride 120 (H) 98 - 107 mmol/L  
 CO2 17 (L) 21 - 32 mmol/L Anion gap 12 7 - 16 mmol/L Glucose 123 (H) 65 - 100 mg/dL BUN 36 (H) 6 - 23 MG/DL Creatinine 2.20 (H) 0.6 - 1.0 MG/DL  
 GFR est AA 29 (L) >60 ml/min/1.73m2 GFR est non-AA 24 (L) >60 ml/min/1.73m2 Calcium 7.0 (L) 8.3 - 10.4 MG/DL MAGNESIUM Collection Time: 02/22/19  3:16 AM  
Result Value Ref Range Magnesium 2.7 (H) 1.8 - 2.4 mg/dL [place] Collection Time: 02/22/19  3:16 AM  
Result Value Ref Range Vancomycin, random 12.8 UG/ML  
TROPONIN I Collection Time: 02/22/19  3:16 AM  
Result Value Ref Range Troponin-I, Qt. 1.34 (HH) 0.02 - 0.05 NG/ML  
GLUCOSE, POC Collection Time: 02/22/19  5:36 AM  
Result Value Ref Range Glucose (POC) 131 (H) 65 - 100 mg/dL Assessment:  
 
Principal Problem: 
  Nontraumatic subarachnoid hemorrhage from right middle cerebral artery ([address] (2/20/2019) Active Problems: 
  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  
 
Recommendations: Continue Acute Rehab Program 
Coordination of rehab/medical care Counseling of PM & R care issues management Monitoring and management of medical conditions per plan of care/orders Discussion with Family/Caregiver/Staff Reviewed Therapies/Labs/Medications/Records Signed By:  Lina Hinojosa MD   
 February 22, 2019

## 2019-02-22 NOTE — PROGRESS NOTES
Bedside shift report received from [facility]. Dual neuro assessment performed. Pt resting quietly on vent, opens eyes to voice and follows commands in all extremities. Lifts arms off bed with no drift, lifts BLE off bed with some effort against gravity. Pupils reactive to light, 4 mm bilaterally. Pt nods appropriately. T 99.3 rectally, MAP within range 70-90, HR NSR 60s, O2 sat 100% on vent. Lines: L quad IJ 
Drips: 1/2 NS @ 125 mL/hr Drains: NGT, blanco Airway: ETT

## 2019-02-22 NOTE — PROGRESS NOTES
Pt is pending surgical intervention today, will follow up for evaluation when pt is medically stable to participate/ as schedule allows.  
 
Danna Bajwa, OT

## 2019-02-23 PROBLEM — R90.89 MIDLINE SHIFT OF BRAIN: Status: ACTIVE | Noted: 2019-01-01

## 2019-02-23 NOTE — PROGRESS NOTES
TRANSFER - IN REPORT: 
 
Verbal report received from [address] [place] [address] on Wm Safer  being received from IR for routine post - op Report consisted of patients Situation, Background, Assessment and  
Recommendations(SBAR). Information from the following report(s) SBAR, Kardex, Procedure Summary, MAR, Recent Results and Cardiac Rhythm NSR/SB was reviewed with the receiving nurse. Opportunity for questions and clarification was provided. Assessment completed upon patients arrival to unit and care assumed.

## 2019-02-23 NOTE — PROGRESS NOTES
Ventilator check complete; patient has a #7.5 ET tube secured at the 22 at the teeth. Patient is not sedated. Patient is able to follow commands. Breath sounds are diminished. Trachea is midline, Negative for subcutaneous air, and chest excursion is symmetric. Patient is also Negative for cyanosis and is Negative for pitting edema. All alarms are set and audible. Resuscitation bag is at the head of the bed. Ventilator Settings Mode FIO2 Rate Tidal Volume Pressure PEEP I:E Ratio PRVC  40 %   15 450 ml     8 cm H20  1:3.4 Peak airway pressure: 13 cm H2O Minute ventilation: 10.2 l/min [address], RT

## 2019-02-23 NOTE — PROGRESS NOTES
TRANSFER - OUT REPORT: 
 
Verbal report given to [address] [place] [address] RN on Chico Waggoner  being transferred to IR for ordered procedure Report consisted of patients Situation, Background, Assessment and  
Recommendations(SBAR). Information from the following report(s) SBAR, Kardex, [facility], Recent Results and Cardiac Rhythm NSR/SB was reviewed with the receiving nurse. Lines:  
Quad Lumen 02/20/19 Left Internal jugular (Active) Central Line Being Utilized Yes 2/22/2019  7:10 AM  
Criteria for Appropriate Use Hemodynamically unstable, requiring monitoring lines, vasopressors, or volume resuscitation 2/22/2019  7:10 AM  
Site Assessment Clean, dry, & intact 2/22/2019  7:10 AM  
Infiltration Assessment 0 2/22/2019  7:10 AM  
Affected Extremity/Extremities Color distal to insertion site pink (or appropriate for race); Pulses palpable;Range of motion performed 2/22/2019  7:10 AM  
Date of Last Dressing Change 02/20/19 2/22/2019  7:10 AM  
Dressing Status Clean, dry, & intact 2/22/2019  7:10 AM  
Dressing Type Disk with Chlorhexadine gluconate (CHG); Transparent;Tape 2/22/2019  7:10 AM  
Proximal Hub Color/Line Status White;Patent; Infusing 2/22/2019  7:10 AM  
Positive Blood Return (Medial Site) Yes 2/21/2019  7:18 PM  
Medial 1 Hub Color/Line Status Gray;Patent; Infusing 2/22/2019  7:10 AM  
Positive Blood Return (Lateral Site) Yes 2/21/2019  7:18 PM  
Medial 2 Hub Color/Line Status Blue;Patent; Infusing 2/22/2019  7:10 AM  
Positive Blood Return (Site #3) Yes 2/21/2019  7:18 PM  
Distal Hub Color/Line Status Brown;Patent; Infusing 2/22/2019  7:10 AM  
Positive Blood Return (Site #4) Yes 2/21/2019  7:18 PM  
Alcohol Cap Used No 2/22/2019  7:10 AM  
   
Arterial Line 02/20/19 Left Radial artery (Active) Site Assessment Intact;Drainage (comment) 2/22/2019  2:41 PM  
Dressing Status Intact 2/22/2019  2:41 PM  
Dressing Type Tape;Transparent 2/22/2019  2:41 PM  
Line Status Intact and in place 2/22/2019  2:41 PM  
 Treatment Zeroed or re-zeroed 2/22/2019  2:41 PM  
Affected Extremity/Extremities Color distal to insertion site pink (or appropriate for race); Pulses palpable;Range of motion performed 2/22/2019  7:10 AM  
  
 
Opportunity for questions and clarification was provided. Patient transported with: 
 Monitor O2 @ 15 liters Registered Nurse RT

## 2019-02-23 NOTE — PROGRESS NOTES
Problem: Mobility Impaired (Adult and Pediatric) Goal: *Acute Goals and Plan of Care (Insert Text) STG: 
(1.)Ms. Cammie Prakash will move from supine to sit and sit to supine , scoot up and down and roll side to side with MAXIMAL ASSIST within 4 treatment day(s). (2.)Ms. Cammie Prakash will transfer from bed to chair and chair to bed with MAXIMAL ASSIST using the least restrictive device within 4 treatment day(s). LTG: 
(1.)Ms. Cammie Prakash will move from supine to sit and sit to supine , scoot up and down and roll side to side in bed with MINIMAL ASSIST within 8 treatment day(s). (2.)Ms. Cammie Prakash will transfer from bed to chair and chair to bed with MINIMAL ASSIST using the least restrictive device within 7 treatment day(s). (3.)Ms. Cammie Prakash will ambulate with MODERATE ASSIST for 25+ feet with the least restrictive device within 8 treatment day(s). ________________________________________________________________________________________________ PHYSICAL THERAPY: Initial Assessment and AM 2/23/2019INPATIENT:   
[place] / [place] B / Product Type: Medicare /   
  
NAME/AGE/GENDER: Ana Fernandez is a 60 y.o. female PRIMARY DIAGNOSIS: SAH (subarachnoid hemorrhage) ([facility]) [I60.9] Nontraumatic subarachnoid hemorrhage from right middle cerebral artery ([facility] Nontraumatic subarachnoid hemorrhage from right middle cerebral artery ([facility] ICD-10: Treatment Diagnosis:  
 · Generalized Muscle Weakness (M62.81) · Difficulty in walking, Not elsewhere classified (R26.2) Precaution/Allergies: 
Codeine and Pcn [penicillins] ASSESSMENT:  
Ms. Cammie Prakash presents supine at arrival with multiple critical lines and wrist restraints. Pt could not provide any data, nonverbal. Some gathered from other staff notes. RN agreed with EOB, but standing may be too much.   Pt followed minimal commands - moving toes, but did not with hand placement and directions to log roll and sit up. She had L contractures with cervical rot L and SBL as well as pushing trunk L when sitting. Poor balanc ein sitting and required lots of hands for UE safety and trunk balance. Did not comply with TKE or toe tapping in sitting. Her impairments include decreased functional mobility, decreased balance, decreased strength, decreased safety awareness, increased risk for falls. Pt would benefit from further PT while in house to address these impairments to help improve to prior level of independence. Discharge recommendations to be determined with progress. This section established at most recent assessment PROBLEM LIST (Impairments causing functional limitations): 1. Decreased Strength 2. Decreased ADL/Functional Activities 3. Decreased Transfer Abilities 4. Decreased Ambulation Ability/Technique 5. Decreased Balance 6. Decreased Activity Tolerance 7. Decreased Pacing Skills 8. Decreased Work Simplification/Energy Conservation Techniques 9. Increased Fatigue 10. Increased Shortness of Breath 11. Decreased Flexibility/Joint Mobility INTERVENTIONS PLANNED: (Benefits and precautions of physical therapy have been discussed with the patient.) 1. Balance Exercise 2. Bed Mobility 3. Gait Training 4. Home Exercise Program (HEP) 5. Neuromuscular Re-education/Strengthening 6. Range of Motion (ROM) 7. Therapeutic Activites 8. Therapeutic Exercise/Strengthening 9. Transfer Training TREATMENT PLAN: Frequency/Duration: 3 times a week for duration of hospital stay Rehabilitation Potential For Stated Goals: Poor RECOMMENDED REHABILITATION/EQUIPMENT: (at time of discharge pending progress): Due to the probability of continued deficits (see above) this patient will likely need continued skilled physical therapy after discharge. Equipment:  
? None at this time HISTORY:  
History of Present Injury/Illness (Reason for Referral): 
 60 y.o. female who was found unresponsive at her home by family. Per report last known normal is unknown, he sister is at bedside but sedated and poor historian, states the pt has not been feeling well for several days. Pt did not answer her door today and her neighbor and sister called EMS to respond, forced entry into home and pt found in bathroom floor unresponsive. Pt taken to [facility] ED for initial evaluation. Per notes pt was awake upon arrival with intermittent command following. Pt had a CT of her head and noted R SAH with ICH/IVH. Dr. Mario Boateng consulted to evaluate pt for underlying vascular abnormality. Pt was intubated prior to arrival by Dr. Brendan Huizar as pt had decreased LOC. Pt arrived to [address] [place] [address] Dr. Jordana Devi  ED intubated and on propofol gtt via ground EMS. Pt wd to pain all extrems and followed commands intermittent. Pt taken to CT for STAT CTA of head and neck which showed pt with R ICH/IVH and R SAH, final read is pending, but noted L MCA aneurysm and what appears to be a large R MCA aneurysm. Per sister pt's mother  from ruptured cerebral aneurysm years ago. Pt has extensive medical history: HIV, Hep C, and CKD. Sister is unsure of her medication compliance, but states she knows pt takes her HIV meds daily, dx . Pt is smoker but sister denies any ETOH or elicit drugs. Pt does take multiple chronic pain meds per sister. There is a neighbor who is with pt's sister as sister is very upset and has taken sedating medications. Pt admitted to ICU under [facility] protocol. Per sister the pt knew that she had aneurysms in her brain, but no treatment in past. 
 
 
Past Medical History/Comorbidities: Ms. Tessa Whitmore  has a past medical history of Acute renal failure ([address] (2014), Anxiety state, unspecified, AR (allergic rhinitis), Arthritis, Autoimmune disease ([address] Candidiasis, Cervical dystonia, Chronic kidney disease (last dialysis 14), Chronic pain, CKD (chronic kidney disease), stage V ([facility] Depression, E. coli septicemia ([facility] (8/1/2014), Edema, Elevated LFTs (8/1/2014), Family history of polycystic kidney, GERD (gastroesophageal reflux disease), Herpes zoster, History of hepatitis C, HIV (human immunodeficiency virus infection) ([facility] (7/30/2014), HIV disease ([facility] (1991), Hypertension, Hyponatremia (7/30/2014), Hypotension, Infectious disease, Liver disease, Lumbago, Other acquired torsion dystonia, Pain in joint, ankle and foot, Polycystic kidney disease, Sepsis ([facility] (7/30/2014), Shoulder pain, and UTI (lower urinary tract infection) (7/30/2014). Ms. Johanne Gandara  has a past surgical history that includes hx vascular access; hx bunionectomy (Bilateral); vascular surgery procedure unlist (Right); vascular surgery procedure unlist (Right, 10/21/14); and vascular surgery procedure unlist (Right, 1/2/15). Social History/Living Environment:  
Home Environment: Private residence One/Two Story Residence: One story Living Alone: No 
Support Systems: Family member(s) Patient Expects to be Discharged to[de-identified] Rehabilitation facility Current DME Used/Available at Home: None Prior Level of Function/Work/Activity: Only info is that pt did drive. Number of Personal Factors/Comorbidities that affect the Plan of Care: 3+: HIGH COMPLEXITY EXAMINATION:  
Most Recent Physical Functioning:  
Gross Assessment: 
AROM: Grossly decreased, non-functional 
Strength: Grossly decreased, non-functional 
Coordination: Grossly decreased, non-functional 
Tone: Normal 
Sensation: Intact Posture: 
  
Balance: 
Sitting: Impaired;High guard Sitting - Static: Poor (constant support) Sitting - Dynamic: Poor (constant support) Bed Mobility: 
Rolling: Total assistance Supine to Sit: Total assistance Sit to Supine: Total assistance Wheelchair Mobility: 
  
Transfers: 
  
Gait: 
  
   
  
Body Structures Involved: 1. Nerves 2. Eyes and Ears 3. Voice/Speech 4. Heart 5. Lungs 6. Metabolic 7. Endocrine 8. Bones 9. Joints 10. Muscles 11. Ligaments Body Functions Affected: 1. Mental 
2. Sensory/Pain 3. Voice and Speech 4. Cardio 5. Hematological 
6. Respiratory 7. Neuromusculoskeletal 
8. Movement Related 9. Metobolic/Endocrine Activities and Participation Affected: 1. General Tasks and Demands 2. Mobility 3. Self Care 4. Domestic Life 5. Community, Social and [place] Number of elements that affect the Plan of Care: 4+: HIGH COMPLEXITY CLINICAL PRESENTATION:  
Presentation: Evolving clinical presentation with unstable and unpredictable characteristics: HIGH COMPLEXITY CLINICAL DECISION MAKIN[address] [place] [address] AM-PAC 6 Clicks Basic Mobility Inpatient Short Form How much difficulty does the patient currently have. .. Unable A Lot A Little None 1. Turning over in bed (including adjusting bedclothes, sheets and blankets)? [x] 1   [] 2   [] 3   [] 4  
2. Sitting down on and standing up from a chair with arms ( e.g., wheelchair, bedside commode, etc.)   [x] 1   [] 2   [] 3   [] 4  
3. Moving from lying on back to sitting on the side of the bed? [x] 1   [] 2   [] 3   [] 4 How much help from another person does the patient currently need. .. Total A Lot A Little None 4. Moving to and from a bed to a chair (including a wheelchair)? [x] 1   [] 2   [] 3   [] 4  
5. Need to walk in hospital room? [x] 1   [] 2   [] 3   [] 4  
6. Climbing 3-5 steps with a railing? [x] 1   [] 2   [] 3   [] 4  
© 2007, Trustees of [address] [place], [place] [address], under license to [employer]. All rights reserved Score:  Initial: 6 Most Recent: X (Date: -- ) Interpretation of Tool:  Represents activities that are increasingly more difficult (i.e. Bed mobility, Transfers, Gait). Medical Necessity:    
· Skilled intervention continues to be required due to decreased function. Reason for Services/Other Comments: · Patient continues to require skilled intervention due to medical complications and patient unable to attend/participate in therapy as expected. Use of outcome tool(s) and clinical judgement create a POC that gives a: Difficult prediction of patient's progress: HIGH COMPLEXITY  
  
 
 
 
TREATMENT:  
(In addition to Assessment/Re-Assessment sessions the following treatments were rendered) Pre-treatment Symptoms/Complaints:  none Pain: Initial:  
Pain Intensity 1: 0  Post Session:  0 Assessment/Reassessment only, no treatment provided today Braces/Orthotics/Lines/Etc:  
· IV 
· blanco catheter · nasogastric tube · rectal probe, others · O2 Device: Endotracheal tube, Ventilator Treatment/Session Assessment:   
· Response to Treatment:  See above · Interdisciplinary Collaboration:  
o Physical Therapist 
o Registered Nurse · After treatment position/precautions:  
o Supine in bed 
o Call light within reach 
o RN notified · Compliance with Program/Exercises: Will assess as treatment progresses · Recommendations/Intent for next treatment session: \"Next visit will focus on advancements to more challenging activities and reduction in assistance provided\". Total Treatment Duration: PT Patient Time In/Time Out Time In: 6418 Time Out: 9330 Cammy Perla DPT

## 2019-02-23 NOTE — PROGRESS NOTES
Bedside and Verbal shift change report given to [address] [place] [address] (oncoming nurse) by Ayah Lovelace (offgoing nurse). Report included the following information SBAR, Kardex, ED Summary, OR Summary, Procedure Summary, Intake/Output, MAR and Recent Results. Dual neuro check performed.

## 2019-02-23 NOTE — PROGRESS NOTES
Progress Note Patient: Beto Huntley MRN: [id number]  SSN: [id number] YOB: 1960  Age: 60 y.o. Sex: female Admit Date: 2/20/2019 LOS: 3 days Subjective: No acute events overnight. Current Facility-Administered Medications Medication Dose Route Frequency  [START ON 2/24/2019] vancomycin (VANCOCIN) 750 mg in  ml infusion  750 mg IntraVENous Q24H  
 0.9% sodium chloride infusion  50 mL/hr IntraVENous CONTINUOUS  
 heparin (porcine) injection 5,000 Units  5,000 Units SubCUTAneous Q12H  
 lansoprazole (PREVACID SOLUTAB) disintegrating tablet 30 mg  30 mg Per NG tube ACB  magnesium oxide (MAG-OX) tablet 400 mg  400 mg Per NG tube BID  senna-docusate (PERICOLACE) 8.6-50 mg per tablet 2 Tab  2 Tab Per NG tube QHS  venlafaxine (EFFEXOR) tablet 50 mg  50 mg Per NG tube TID WITH MEALS  abacavir (ZIAGEN) 20 mg/mL oral solution 300 mg  300 mg Per G Tube Q12H  
 PHENYLephrine (PF)(ROBI-SYNEPHRINE) 30 mg in 0.9% sodium chloride 250 mL infusion   mcg/min IntraVENous TITRATE  
 NOREPINephrine (LEVOPHED) 4 mg/250 mL (16 mcg/mL) in NS infusion  0-16 mcg/min IntraVENous TITRATE  heparin (PF) 2 units/ml in NS infusion 2,000 Units  1,000 mL Irrigation PRN  
 0.9% sodium chloride infusion 250 mL  250 mL IntraVENous PRN  
 midazolam (VERSED) injection 2-4 mg  2-4 mg IntraVENous Multiple  0.9% sodium chloride infusion 250 mL  250 mL IntraVENous PRN  niCARdipine (CARDENE) 25 mg in 0.9% sodium chloride 250 mL infusion  0-15 mg/hr IntraVENous TITRATE  cefepime (MAXIPIME) 1 g in 0.9% sodium chloride (MBP/ADV) 50 mL ADV  1 g IntraVENous Q24H  
 insulin lispro (HUMALOG) injection   SubCUTAneous Q6H  
 acetaminophen (TYLENOL) tablet 650 mg  650 mg Oral Q4H PRN  Vancomycin Intermittent Therapy--Pharmacy to Dose   Other Rx Dosing/Monitoring  polyvinyl alcohol (LIQUIFILM TEARS) 1.4 % ophthalmic solution 1 Drop  1 Drop Both Eyes PRN  
  niMODipine (NIMOTOP) 30 mg/mL oral solution (compound) 30 mg  30 mg Oral Q4H  
 NUTRITIONAL SUPPORT ELECTROLYTE PRN ORDERS   Does Not Apply PRN  
 darunavir ethanolate (PREZISTA) tablet 800 mg  800 mg Oral 7am  
 dolutegravir (TIVICAY) tablet 50 mg  50 mg Oral 7am  
 ritonavir (NORVIR) tablet 100 mg  100 mg Oral 7am  
 ondansetron (ZOFRAN) injection 4 mg  4 mg IntraVENous Q6H PRN  
 bisacodyl (DULCOLAX) tablet 5 mg  5 mg Oral DAILY PRN  propofol (DIPRIVAN) infusion  0-50 mcg/kg/min IntraVENous TITRATE  fentaNYL citrate (PF) injection 50 mcg  50 mcg IntraVENous Q2H PRN Objective:  
 
Vitals:  
 02/23/19 1215 02/23/19 1224 02/23/19 1231 02/23/19 1245 BP:   121/60 Pulse: 79 79 80 82 Resp: 17 19 16 17 Temp:      
SpO2: 100% 100% 100% 100% Weight:      
  
  
Intake and Output: 
Current Shift: 02/23 0701 - 02/23 1900 In: -  
Out: 1000 [Urine:1000] Last 24 hr: 02/22 0701 - 02/23 0700 In: 4732 [I.V.:7556] Out: 1910 [[place] [facility]:3531] IO: +6131/24 hrs TOTAL OVERALL: +9551 Physical Exam:  
General:  Alert, cooperative, no distress, appears stated age. Eyes:  Conjunctivae/corneas clear. PERRL, EOMs intact. Fundi benign Neck: Supple, symmetrical, trachea midline, no adenopathy, thyroid: no enlargment/tenderness/nodules, no carotid bruit and no JVD. Lungs:   Clear to auscultation bilaterally. Heart:  Regular rate and rhythm, S1, S2 normal, no murmur, click, rub or gallop. Abdomen:   Soft, non-tender. Bowel sounds normal. No masses,  No organomegaly. Extremities: Extremities normal, atraumatic, no cyanosis or edema. Pulses: 2+ and symmetric all extremities. Skin: Skin color, texture, turgor normal. No rashes or lesions Neurologic: CNII-XII intact. Normal strength, sensation and reflexes throughout. Lab/Data Review: 
 
Recent Results (from the past 12 hour(s)) CBC W/O DIFF Collection Time: 02/23/19  3:01 AM  
Result Value Ref Range WBC 12.6 (H) 4.3 - 11.1 K/uL RBC 2.98 (L) 4.05 - 5.2 M/uL HGB 9.2 (L) 11.7 - 15.4 g/dL HCT 29.4 (L) 35.8 - 46.3 % MCV 98.7 (H) 79.6 - 97.8 FL  
 MCH 30.9 26.1 - 32.9 PG  
 MCHC 31.3 (L) 31.4 - 35.0 g/dL  
 RDW 16.3 (H) 11.9 - 14.6 % PLATELET 559 (L) 652 - 450 K/uL MPV 9.8 9.4 - 12.3 FL ABSOLUTE NRBC 0.00 0.0 - 0.2 K/uL METABOLIC PANEL, BASIC Collection Time: 02/23/19  3:01 AM  
Result Value Ref Range Sodium 151 (H) 136 - 145 mmol/L Potassium 3.4 (L) 3.5 - 5.1 mmol/L Chloride 123 (H) 98 - 107 mmol/L  
 CO2 14 (L) 21 - 32 mmol/L Anion gap 14 7 - 16 mmol/L Glucose 128 (H) 65 - 100 mg/dL BUN 36 (H) 6 - 23 MG/DL Creatinine 2.21 (H) 0.6 - 1.0 MG/DL  
 GFR est AA 29 (L) >60 ml/min/1.73m2 GFR est non-AA 24 (L) >60 ml/min/1.73m2 Calcium 7.4 (L) 8.3 - 10.4 MG/DL MAGNESIUM Collection Time: 02/23/19  3:01 AM  
Result Value Ref Range Magnesium 2.5 (H) 1.8 - 2.4 mg/dL [place] Collection Time: 02/23/19  3:01 AM  
Result Value Ref Range Vancomycin, random 17.1 UG/ML  
POC G3 Collection Time: 02/23/19  3:21 AM  
Result Value Ref Range Device: VENT    
 FIO2 (POC) 50 % pH (POC) 7.275 (L) 7.35 - 7.45    
 pCO2 (POC) 27.2 (L) 35 - 45 MMHG  
 pO2 (POC) 185 (H) 75 - 100 MMHG  
 HCO3 (POC) 12.6 (L) 22 - 26 MMOL/L  
 sO2 (POC) 100 (H) 95 - 98 % Base deficit (POC) 13 mmol/L Mode Pressure regulated volume control Tidal volume 450 ml Set Rate 15 bpm  
 PEEP/CPAP (POC) 8 cmH2O Allens test (POC) NOT APPLICABLE Inspiratory Time 0.9 sec Site DRAWN FROM ARTERIAL LINE Patient temp. 98.6 Specimen type (POC) ARTERIAL Performed by Rikki   
 CO2, POC 13 MMOL/L Respiratory comment: NurseNotified Exhaled minute volume 8.10 L/min COLLECT TIME 315 GLUCOSE, POC Collection Time: 02/23/19  5:17 AM  
Result Value Ref Range Glucose (POC) 118 (H) 65 - 100 mg/dL SODIUM  Collection Time: 02/23/19  9:29 AM  
 Result Value Ref Range Sodium 152 (H) 136 - 145 mmol/L  
POTASSIUM Collection Time: 02/23/19  9:29 AM  
Result Value Ref Range Potassium 3.6 3.5 - 5.1 mmol/L  
GLUCOSE, POC Collection Time: 02/23/19 11:08 AM  
Result Value Ref Range Glucose (POC) 130 (H) 65 - 100 mg/dL CK Collection Time: 02/23/19 11:38 AM  
Result Value Ref Range  (H) 21 - 215 U/L  
TRIGLYCERIDE Collection Time: 02/23/19 11:38 AM  
Result Value Ref Range Triglyceride 343 (H) 35 - 150 MG/DL  
HEPATIC FUNCTION PANEL Collection Time: 02/23/19 11:38 AM  
Result Value Ref Range Protein, total 5.7 (L) 6.3 - 8.2 g/dL Albumin 2.7 (L) 3.5 - 5.0 g/dL Globulin 3.0 2.3 - 3.5 g/dL A-G Ratio 0.9 (L) 1.2 - 3.5 Bilirubin, total 0.5 0.2 - 1.1 MG/DL Bilirubin, direct 0.2 <0.4 MG/DL Alk. phosphatase 79 50 - 136 U/L  
 AST (SGOT) 67 (H) 15 - 37 U/L  
 ALT (SGPT) 56 12 - 65 U/L Assessment/ Plan:  
 
Principal Problem: 
  Nontraumatic subarachnoid hemorrhage from right middle cerebral artery ([address] (2/20/2019) Active Problems: 
  Acute cystitis without hematuria (2/21/2019) Pneumonia of left lung due to infectious organism (2/21/2019) Nontraumatic subcortical hemorrhage of right cerebral hemisphere ([address] (2/21/2019) Communicating hydrocephalus (2/21/2019) Elevated troponin (2/21/2019) Cerebral edema ([address] (2/21/2019) NEURO:Pt has [facility] with IVH/R ICH from large right MCA aneurysm s/p coiling 2/22/2019. Admitted to ICU. Q1 hour neuro checks. On SAH protocol - nimotop, Mg, lipitor. Hoang Mcdaniel 4, Macias  Grade 4. CT head this am which I reviewed shows stable right frontal ICH with 7mm midline shift and IVH. D/C 3%. CTA this am which I reviewed shows no filling of aneurysm and no obvious vasospasm. Pt is alert and follows commands, keenan. Afebrile.  
RESP: Pt intubated with 7.5 OETT, 22 at teeth, intact with breath sound bilat. No distress. Chest CT done upon arrival: MERY, density LLL and small left pleural effusion. Pt on PRVC @ 40%, P8, R15: AB.2//185. Has respiratory alkalosis due to metabolic acidosis. CV:SBP goal <160.  cardene gtt prn, 2D Echo: EF 40-45%, stress induced cardiomyopathy consistent with Takosubo. Trop peaked at 6.4, has trended back down, last one 1.4.+ murmur noted on this am exam. HEME: HH 9.2/29. . Started on Sq hep 2-21,  HIT PANEL NEG. Decreased heparin 5000 units to bid. NEPH: bun/cr: 36/2.2 (hx of CKD). NS @ 50cc/hr. dc'd 3% due to metabolic acidosis. Giving 200 meq of bicarb and will recheck ABG. GI: resume TF today as tolerated. Pepcid. CK, triglycerides and LFTs are okay. ID: TM: [id number].  CT chest shows MERY pneumonia, will castro cx and start cefepime/vanc D4/10,. Pan cx Neg thus far, UA with 91214 gram neg, will cont abx as pt with Chest CT MERY/LLL infiltrates. Cont monitor. LINES: Left IJ quad, left art line. Shmuel.  
  
 
 
Signed By: Ozzie Miguel NP 2019

## 2019-02-23 NOTE — PROGRESS NOTES
Pharmacokinetic Consult to Pharmacist 
 
Tessa Sagastume is a 60 y.o. female being treated for CAP, UTI with vancomycin. Weight: 54.6 kg (120 lb 4.8 oz) Lab Results Component Value Date/Time BUN 36 (H) 02/23/2019 03:01 AM  
 Creatinine 2.21 (H) 02/23/2019 03:01 AM  
 WBC 12.6 (H) 02/23/2019 03:01 AM  
 Procalcitonin 0.7 02/21/2019 06:09 AM  
 Lactic acid 1.1 02/21/2019 12:26 AM  
 Lactic acid 2.6 (H) 07/30/2014 02:09 AM  
 Lactic Acid (POC) 2.63 (H) 02/20/2019 07:24 PM  
  
Estimated Creatinine Clearance: 23.6 mL/min (A) (based on SCr of 2.21 mg/dL (H)). CULTURES: 
All Micro Results Procedure Component Value Units Date/Time CULTURE, RESPIRATORY/SPUTUM/BRONCH Alfred Friendly [[id number]] Collected:  02/21/19 0211 Order Status:  Completed Specimen:  Sputum Updated:  02/23/19 1245 Special Requests: NO SPECIAL REQUESTS     
  GRAM STAIN 0 TO 15 WBCS/OIF  
   NO EPITHELIAL CELLS SEEN     
   FEW GRAM POSITIVE COCCI     
   2+ MUCUS PRESENT Culture result: MODERATE NORMAL RESPIRATORY JR  
     
 CULTURE, BLOOD [[id number]] Collected:  02/21/19 0026 Order Status:  Completed Specimen:  Blood Updated:  02/23/19 5360 Special Requests: CENTRAL Culture result: NO GROWTH 2 DAYS     
 CULTURE, BLOOD [[id number]] Collected:  02/21/19 0026 Order Status:  Completed Specimen:  Blood Updated:  02/23/19 5629 Special Requests: --     
  NO SPECIAL REQUESTS 
ARTL (ART LINE) Culture result: NO GROWTH 2 DAYS     
 CULTURE, URINE [[id number]]  (Abnormal) Collected:  02/20/19 2237 Order Status:  Completed Specimen:  Urine from Mi Specimen Updated:  02/23/19 0739 Special Requests: NO SPECIAL REQUESTS Culture result:    
  47039 COLONIES/mL GRAM NEGATIVE RODS IDENTIFICATION AND SUSCEPTIBILITY TO FOLLOW Day 3 of vancomycin. Goal trough is 15-20. Patient with CKD at baseline. Renal function stable over last 24 hours. The vancomycin level returned within the therapeutic range ~21 hours after the last dose. As patient consistently clearing doses will start vancomycin 750 mg q24 for now. Will watch renal function closely. Will continue to follow patient. Thank you, Juana Whitfield, PharmD, [facility] Clinical Pharmacist 
[phone]

## 2019-02-23 NOTE — PROGRESS NOTES
Bedside and Verbal shift change report given to Billy Scott (oncoming nurse) by Elias Mcdermott (offgoing nurse). Report included the following information SBAR, Kardex, ED Summary, OR Summary, Procedure Summary, Intake/Output, MAR and Recent Results. Dual neuro assessment performed.

## 2019-02-23 NOTE — PROGRESS NOTES
Pt family asked to meet with [facility] about care planning for pt to begin to manage pt's affairs as she has no POA or HC POA. [facility] met with pt's Father, Petros Barlow [phone], sister Adeel Alarcon [phone] and sister's daughter/niece Gerson Carreno [phone]. Pt has no children so her Father is her decisional family member. Niece Gretel has verbally been appointed by pt's father as designated spokesperson for pt as she lives closest and she plans to contact an  to get emergency guardianship to assist pt with managing her affairs as her recovery is anticipated to last at least several months. Provided Raquel with Alcira Walton RN, CM name to contact if additional questions arise.

## 2019-02-24 NOTE — PROGRESS NOTES
Dr. Solitario Buenrostro and [facility], NP at bedside; aware of pt neuro status and VS; aware of persistent low grade fevers. Pt moving all extremities. R / strength > L /strength. Order to discontinue 3% saline but to continue checking Na Q6H. Orders received to change BP parameters to systolic<160 and restart TF at previous order.

## 2019-02-24 NOTE — PROGRESS NOTES
Called pt's daughter, Adam Jimenez, to obtain MRI consent. Phone consent dually verified with Patti Jimenez RN. Adam Jimenez stated she wanted to make pt DNR and had discussed this with their father. Also dually verified with Patti Jimenez, RN. Notified [facility], NP. To further discuss with Adam Jimenez.

## 2019-02-24 NOTE — PROGRESS NOTES
Ventilator check complete; patient has a #7.5 ET tube secured at the 23 at the teeth. Patient is not sedated. Patient is not able to follow commands. Breath sounds are clear and diminished. Trachea is midline, Negative for subcutaneous air, and chest excursion is symmetric. Patient is also Negative for cyanosis. All alarms are set and audible. Resuscitation bag is at the head of the bed. Ventilator Settings Mode FIO2 Rate Tidal Volume Pressure PEEP I:E Ratio PRVC  40 %   15 450 ml     8 cm H20  1:3.4 Peak airway pressure: 1 cm H2O Minute ventilation: 10.8 l/min ABG: No results for input(s): PH, PCO2, PO2, HCO3 in the last 72 hours.  
 
 
Maribell Garcia, RT

## 2019-02-24 NOTE — PROGRESS NOTES
Bedside and Verbal shift change report given to [address] [place] [address] (oncoming nurse) by Kevon Buchanan (offgoing nurse). Report included the following information SBAR, Kardex, ED Summary, OR Summary, Procedure Summary, Intake/Output, MAR and Recent Results. Dual neuro assessment performed.

## 2019-02-24 NOTE — PROGRESS NOTES
Bedside shift report received from Jh Barron, [facility]. Dual neuro assessment completed. Pt lethargic, febrile - 99 rectally. Pt opens eyes to voice, decreased command following with repetitive instruction. Moving all extremities with stimulus. NSR on monitor, SBP < 160 on cardene gtt. Lines: L quad IJ, L radial art line Drips: NS @ 50 mL/hr, cardene @ 5 mg/hr Drains: NGT, blanco Airway: ETT

## 2019-02-24 NOTE — PROGRESS NOTES
Dr. Shania Workman and Regina Camargo NP evaluated pt en route to MRI. Pt responding to stimulus at this time, intermittently following commands with R arm and leg, responding to stimulus in L side. To await MRI results.

## 2019-02-24 NOTE — PROGRESS NOTES
Ventilator check complete; patient has a #7.5 ET tube secured at the 23 at the teeth. Patient is not sedated. Patient is not able to follow commands. Breath sounds are coarse. Trachea is midline, Negative for subcutaneous air, and chest excursion is symmetric. Patient is also Negative for cyanosis and is Negative for pitting edema. All alarms are set and audible. Resuscitation bag is at the head of the bed. Ventilator Settings Mode FIO2 Rate Tidal Volume Pressure PEEP I:E Ratio PRVC  40 %   15 450 ml     8 cm H20  1:3.4 Peak airway pressure: 10 cm H2O Minute ventilation: 14.5 l/min [address], RT

## 2019-02-24 NOTE — PROGRESS NOTES
Bedside shift report received and dual neuro assessment completed with NAGA Parker. Pt arouses to voice/ opens eyes to voice. Able to move all extremities. Right / strength> left / strength. Temp 99.5; SR on bedside monitor; restarted Cardene for MAP>90. Will continue to monitor.

## 2019-02-24 NOTE — PROGRESS NOTES
[facility], NP at bedside. Aware of neuro status. Pt moving R side to command, moving L side to stimulus and crossing midline. CT performed this AM. NP to order MRI for today. Pupils remain reactive bilaterally. Intermittent fevers and shivering noted. Discussed pt's HIV meds being on hold.

## 2019-02-24 NOTE — PROGRESS NOTES
Tarsha Griffin NP notified of pt being slightly more lethargic and temperature increase despite tylenol. Orders received.

## 2019-02-25 PROBLEM — R40.2432 GLASGOW COMA SCALE SCORE 3-8, AT ARRIVAL TO EMERGENCY DEPARTMENT (HCC): Status: ACTIVE | Noted: 2019-01-01

## 2019-02-25 NOTE — PROGRESS NOTES
SPEECH PATHOLOGY NOTE: 
  
Order received and chart reviewed. Patient currently on ventilator and not able to participate in po trials.  Will follow and re-attempt when appropriate.  
  
Kevin Bermudez MS, CCC-SLP

## 2019-02-25 NOTE — PROGRESS NOTES
Progress Note Patient: Porfirio Kahn MRN: [id number]  SSN: [id number] YOB: 1960  Age: 60 y.o. Sex: female Admit Date: 2/20/2019 LOS: 5 days Subjective:  
Pt more drowsy this am, not following commands as briskly as she did in last 48hours. She did wiggle toes on R for me, but otherwise kept eyes closed and did not follow any other commands. Pt remains intubated, no sedation >24hours. Current Facility-Administered Medications Medication Dose Route Frequency  aspirin chewable tablet 81 mg  81 mg Per NG tube DAILY  ibuprofen (MOTRIN) tablet 600 mg  600 mg Per NG tube Q4H PRN  
 acetaminophen (TYLENOL) tablet 650 mg  650 mg Per NG tube Q4H PRN  niCARdipine (CARDENE) 25 mg in 0.9% sodium chloride 250 mL infusion  0-15 mg/hr IntraVENous TITRATE  vancomycin (VANCOCIN) 750 mg in  ml infusion  750 mg IntraVENous Q24H  
 0.9% sodium chloride infusion  50 mL/hr IntraVENous CONTINUOUS  
 heparin (porcine) injection 5,000 Units  5,000 Units SubCUTAneous Q12H  
 lansoprazole (PREVACID SOLUTAB) disintegrating tablet 30 mg  30 mg Per NG tube ACB  magnesium oxide (MAG-OX) tablet 400 mg  400 mg Per NG tube BID  senna-docusate (PERICOLACE) 8.6-50 mg per tablet 2 Tab  2 Tab Per NG tube QHS  venlafaxine (EFFEXOR) tablet 50 mg  50 mg Per NG tube TID WITH MEALS  abacavir (ZIAGEN) 20 mg/mL oral solution 300 mg  300 mg Per G Tube Q12H  
 heparin (PF) 2 units/ml in NS infusion 2,000 Units  1,000 mL Irrigation PRN  
 0.9% sodium chloride infusion 250 mL  250 mL IntraVENous PRN  
 midazolam (VERSED) injection 2-4 mg  2-4 mg IntraVENous Multiple  0.9% sodium chloride infusion 250 mL  250 mL IntraVENous PRN  
 cefepime (MAXIPIME) 1 g in 0.9% sodium chloride (MBP/ADV) 50 mL ADV  1 g IntraVENous Q24H  
 insulin lispro (HUMALOG) injection   SubCUTAneous Q6H  
 polyvinyl alcohol (LIQUIFILM TEARS) 1.4 % ophthalmic solution 1 Drop  1 Drop Both Eyes PRN  
  niMODipine (NIMOTOP) 30 mg/mL oral solution (compound) 30 mg  30 mg Oral Q4H  
 NUTRITIONAL SUPPORT ELECTROLYTE PRN ORDERS   Does Not Apply PRN  
 darunavir ethanolate (PREZISTA) tablet 800 mg  800 mg Oral 7am  
 dolutegravir (TIVICAY) tablet 50 mg  50 mg Oral 7am  
 ritonavir (NORVIR) tablet 100 mg  100 mg Oral 7am  
 ondansetron (ZOFRAN) injection 4 mg  4 mg IntraVENous Q6H PRN  
 bisacodyl (DULCOLAX) tablet 5 mg  5 mg Oral DAILY PRN  
 fentaNYL citrate (PF) injection 50 mcg  50 mcg IntraVENous Q2H PRN Objective:  
 
Vitals:  
 02/25/19 3696 02/25/19 6443 02/25/19 5606 02/25/19 0940 BP: 148/68  119/57 Pulse: 88 85 80 Resp:  25 Temp:    99.4 °F (37.4 °C) SpO2:  100% Weight:      
  
  
Intake and Output: 
Current Shift: 02/25 0701 - 02/25 1900 In: 48 Out: 475 [Urine:475] Last 24 hr: 02/24 0701 - 02/25 0700 In: 3510.8 [I.V.:1927.8] Out: 2900 [Urine:2900] IO: -988/24hr TOTAL overall:  +9717 Physical Exam:  
General:  Drowsy, intubated. Decreased following commands. Eyes:  Left pupil +5/S, R pupil +4/B. Neck: Supple, symmetrical, trachea midline, no adenopathy, thyroid: no enlargment/tenderness/nodules, no carotid bruit and no JVD. Lungs:   Clear to auscultation bilaterally. Heart:  S1/S2. + murmur Abdomen:   Soft, non-tender. Bowel sounds normal. No masses,  No organomegaly. Extremities: Extremities normal, atraumatic, no cyanosis or edema. Pulses: 2+ and symmetric all extremities. Skin: Skin color, texture, turgor normal. No rashes or lesions Neurologic: Pt drowsy, not following commands as well this am, moved R toes to command but otherwise did not. Wd pain all extrems and is purposeful. Lab/Data Review: 
 
Recent Results (from the past 12 hour(s)) SODIUM Collection Time: 02/24/19 10:14 PM  
Result Value Ref Range Sodium 157 (H) 136 - 145 mmol/L  
GLUCOSE, POC Collection Time: 02/24/19 11:22 PM  
Result Value Ref Range Glucose (POC) 227 (H) 65 - 100 mg/dL POC G3 Collection Time: 02/25/19  3:11 AM  
Result Value Ref Range Device: VENT    
 FIO2 (POC) 40 % pH (POC) 7.409 7.35 - 7.45    
 pCO2 (POC) 27.6 (L) 35 - 45 MMHG  
 pO2 (POC) 130 (H) 75 - 100 MMHG  
 HCO3 (POC) 17.5 (L) 22 - 26 MMOL/L  
 sO2 (POC) 99 (H) 95 - 98 % Base deficit (POC) 7 mmol/L Mode Pressure regulated volume control Tidal volume 450 ml Set Rate 12 bpm  
 PEEP/CPAP (POC) 8 cmH2O Allens test (POC) NOT APPLICABLE Inspiratory Time 0.9 sec Site DRAWN FROM ARTERIAL LINE Patient temp. 98.6 Specimen type (POC) ARTERIAL Performed by Yuki EAGLE, POC 18 MMOL/L Respiratory comment: NurseNotified Exhaled minute volume 10.60 L/min COLLECT TIME 311 CBC W/O DIFF Collection Time: 02/25/19  3:28 AM  
Result Value Ref Range WBC 11.7 (H) 4.3 - 11.1 K/uL  
 RBC 2.89 (L) 4.05 - 5.2 M/uL HGB 9.1 (L) 11.7 - 15.4 g/dL HCT 27.8 (L) 35.8 - 46.3 % MCV 96.2 79.6 - 97.8 FL  
 MCH 31.5 26.1 - 32.9 PG  
 MCHC 32.7 31.4 - 35.0 g/dL  
 RDW 15.9 (H) 11.9 - 14.6 % PLATELET 938 (L) 015 - 450 K/uL MPV 9.6 9.4 - 12.3 FL ABSOLUTE NRBC 0.00 0.0 - 0.2 K/uL METABOLIC PANEL, BASIC Collection Time: 02/25/19  3:28 AM  
Result Value Ref Range Sodium 155 (H) 136 - 145 mmol/L Potassium 3.6 3.5 - 5.1 mmol/L Chloride 127 (H) 98 - 107 mmol/L  
 CO2 20 (L) 21 - 32 mmol/L Anion gap 8 7 - 16 mmol/L Glucose 227 (H) 65 - 100 mg/dL BUN 38 (H) 6 - 23 MG/DL Creatinine 2.26 (H) 0.6 - 1.0 MG/DL  
 GFR est AA 28 (L) >60 ml/min/1.73m2 GFR est non-AA 24 (L) >60 ml/min/1.73m2 Calcium 7.9 (L) 8.3 - 10.4 MG/DL MAGNESIUM Collection Time: 02/25/19  3:28 AM  
Result Value Ref Range Magnesium 2.4 1.8 - 2.4 mg/dL Pawan Riedel Collection Time: 02/25/19  3:28 AM  
Result Value Ref Range Vancomycin,trough 18.6 5 - 20 ug/mL ASPIRIN TEST  Collection Time: 02/25/19  3:28 AM  
 Result Value Ref Range Aspirin test 350 (L) 620 - 672 ARU  
GLUCOSE, POC Collection Time: 19  5:02 AM  
Result Value Ref Range Glucose (POC) 205 (H) 65 - 100 mg/dL Assessment/ Plan:  
 
Principal Problem: 
  Nontraumatic subarachnoid hemorrhage from right middle cerebral artery ([address] (2019) Active Problems: 
  Acute cystitis without hematuria (2019) Pneumonia of left lung due to infectious organism (2019) Nontraumatic subcortical hemorrhage of right cerebral hemisphere ([address] (2019) Communicating hydrocephalus (2019) Elevated troponin (2019) Cerebral edema ([address] (2019) Midline shift of brain (2019) NEURO:Pt has SAH with IVH/R ICH from large right MCA aneurysm s/p coiling 2019. Admitted to ICU. Q1 hour neuro checks. On SAH protocol - nimotop, Mg, lipitor. Hoang Mcdaniel 4, Macias  Grade 4. CT head shows stable right frontal ICH with 7mm midline shift and IVH. CTA shows no filling of aneurysm and no obvious vasospasm. CTP was normal. Pt drowsy this am, left weaker than yesterday, did not want to follow consistent. MRI brain showed only small HITS mainly in the right ICA territory and nothing that would explain her being drowsy or weak. ASA response: 350, started on 81mg po daily. Will start ritalin and Amantadine. Spoke with the sister and updated her. RESP: Pt intubated with 7.5 OETT, 22 at teeth, intact with breath sound bilat. No distress. Chest CT done upon arrival: MEYR, density LLL and small left pleural effusion. Pt on PRVC @ 40%, P8, R15: AB.4/130. CV:SBP goal <180.  cardene gtt prn, 2D Echo: EF 40-45%, stress induced cardiomyopathy consistent with Takosubo. Trop peaked at 6.4, has trended back down, last one 1.4.+ murmur noted on exam. Will repeat echo next week.  HEME: HH 9.1.  .  heparin 5000 units to bid.  
NEPH: bun/cr: 38/22 (hx of CKD). GI: TF today as tolerated. Pepcid. CK, triglycerides and LFTs are okay.   
ID: [id number]  CT chest shows MERY pneumonia, will pan cx and start cefepime/vanc D6/10,.  Pan cx Neg thus far, UA with 88116 gram neg, will cont abx as pt with Chest CT MERY/LLL infiltrates. Cont monitor. LINES: Left IJ quad, left art line. Mi.  
 
 
Signed By: Jeri Hernandez, NP February 25, 2019

## 2019-02-25 NOTE — INTERDISCIPLINARY ROUNDS
Interdisciplinary team rounds were held 2/25/2019 with the following team members:Nursing, Nurse Practitioner, Nutrition, Pastoral Care, Pharmacy, Physician and Respiratory Therapy. Plan of care discussed. See clinical pathway and/or care plan for interventions and desired outcomes.

## 2019-02-25 NOTE — PROGRESS NOTES
Ventilator check complete; patient has a #7.5 ET tube secured at the 24 at the lip. Patient is not sedated. Patient is able to follow commands. Breath sounds are coarse. Trachea is midline, Negative for subcutaneous air, and chest excursion is symmetric. Patient is also Negative for cyanosis. All alarms are set and audible. Resuscitation bag is at the head of the bed. Ventilator Settings Mode FIO2 Rate Tidal Volume Pressure PEEP I:E Ratio PRVC  40 %   12 450 ml     8 cm H20  1:4.5 Peak airway pressure: 14 cm H2O Minute ventilation: 9.7 l/min ABG: No results for input(s): PH, PCO2, PO2, HCO3 in the last 72 hours.  
 
 
Maribell Marcano, RT

## 2019-02-25 NOTE — PROGRESS NOTES
Bedside shift report received from [place], [place]. Pt only responsive to stimulus, no command following at this time. Moves all extremities to stimulus. Pupils reactive to light bilaterally. T 100 rectally, cooling blanket restarted, SBP 140s on cardene gtt, O2 sat 100% on FiO2 40%. Lines: L quad IJ, L radial art line Drains: blanco, NGT Drips: NS @ 50 mL/hr, cardene @ 5 mg/hr Airway: ETT Skin assessed. Swelling noted in all extremities. Small area of blistering noted to R groin. Allevyn intact to sacrum. Prevalon boots in place.

## 2019-02-25 NOTE — PROGRESS NOTES
Problem: Nutrition Deficit Goal: *Optimize nutritional status Nutrition F/U: 
TF Management for Dr. Charles Chi, NP. Assessment: 
Weight 54.6 kg (ICU bed 2/20/19), edema - 2+ pitting all extremities. The patient remains intubated, ventilated and TF-dependent. TF was started 2/21 and remained at a trickle rate for 2 days before being advanced to its goal rate. Good GI tolerance is noted with low gastric residuals reported. Labs are remarkable for sodium 155 and AM glucose 227; POC glucose (2/24) 183,173,190,227 and (2/25) 205,201. Last bowel movement is unknown. The patient has been receiving Pericolace since 2/22, but no bowel movement yet. Enteral Access: 
 NGT. Macronutrient Needs: 
Estimated calorie needs - 2521-6374 melanie/day (25-28 melanie/kg/day) Estimated protein needs - 44-66 gm pro/day (0.8-1.2 gm pro/kgIBW/day) (GFR 24 ml/min) Max CHO/day - 193 gm CHO/day (50% melanie/day)  
Fluid/day - 1.4-1.6 liters/day (1 ml/melanie/day) Intake/Comparative Standards: 
Current intake of TF (Osmolite 1.2 @ 50 ml/hr with a 20 ml/hr water flush) provides 1440 calories/day (100% calorie goal), 67 grams protein/day (100% protein goal), 190 grams CHO/day (does not exceed max CHO limit) and 1464 ml water/day (100% fluid goal). Intervention:  
Meals and Snacks: NPO. Enteral Nutrition: Change TF formula to Glucerna 1.2 @ 50 ml/hr with a 20 ml/hr water flush for better glucose control -1440 calories/day (100% calorie goal), 72 grams protein/day (100% protein goal), 138 grams CHO/day (does not exceed max CHO limit) and 1452 ml water/day (100% fluid goal). Mineral Supplement Therapy: Nutrition Support Orders/Electrolyte Management Replacement Protocols are active on the MAR. Nutrition-Related Medication Management: Dulcolax suppository x 1 today. Coordination of Nutrition Care by a Nutrition Professional: AM ICU rounds, collaboration with Alpesh Charles RN. Nutrition Discharge Plan: Too soon to determine. Ling Favorite. Chandan Vargas 
[phone]

## 2019-02-25 NOTE — PROGRESS NOTES
Progress Note Patient: Ana Fernandez MRN: [id number]  SSN: [id number] YOB: 1960  Age: 60 y.o. Sex: female Admit Date: 2/20/2019 LOS: 4 days Subjective:  
Pt more drowsy this am, LUE weak and pt not wanting to follow commands. She was sent down for CT head and CTA/CTP- there was no acute neuro changes. MRI brain today completed. Pt remains in ICU, intubated and sedation minimal.  
 
Current Facility-Administered Medications Medication Dose Route Frequency  ibuprofen (MOTRIN) tablet 600 mg  600 mg Per NG tube Q4H PRN  
 [START ON 2/25/2019] Vancomycin Trough Reminder   Other ONCE  
 acetaminophen (TYLENOL) tablet 650 mg  650 mg Per NG tube Q4H PRN  
 [START ON 2/25/2019] aspirin chewable tablet 81 mg  81 mg Oral DAILY  vancomycin (VANCOCIN) 750 mg in  ml infusion  750 mg IntraVENous Q24H  
 0.9% sodium chloride infusion  50 mL/hr IntraVENous CONTINUOUS  
 heparin (porcine) injection 5,000 Units  5,000 Units SubCUTAneous Q12H  
 lansoprazole (PREVACID SOLUTAB) disintegrating tablet 30 mg  30 mg Per NG tube ACB  magnesium oxide (MAG-OX) tablet 400 mg  400 mg Per NG tube BID  senna-docusate (PERICOLACE) 8.6-50 mg per tablet 2 Tab  2 Tab Per NG tube QHS  venlafaxine (EFFEXOR) tablet 50 mg  50 mg Per NG tube TID WITH MEALS  abacavir (ZIAGEN) 20 mg/mL oral solution 300 mg  300 mg Per G Tube Q12H  
 heparin (PF) 2 units/ml in NS infusion 2,000 Units  1,000 mL Irrigation PRN  
 0.9% sodium chloride infusion 250 mL  250 mL IntraVENous PRN  
 midazolam (VERSED) injection 2-4 mg  2-4 mg IntraVENous Multiple  0.9% sodium chloride infusion 250 mL  250 mL IntraVENous PRN  
 cefepime (MAXIPIME) 1 g in 0.9% sodium chloride (MBP/ADV) 50 mL ADV  1 g IntraVENous Q24H  
 insulin lispro (HUMALOG) injection   SubCUTAneous Q6H  
 polyvinyl alcohol (LIQUIFILM TEARS) 1.4 % ophthalmic solution 1 Drop  1 Drop Both Eyes PRN  
  niMODipine (NIMOTOP) 30 mg/mL oral solution (compound) 30 mg  30 mg Oral Q4H  
 NUTRITIONAL SUPPORT ELECTROLYTE PRN ORDERS   Does Not Apply PRN  
 darunavir ethanolate (PREZISTA) tablet 800 mg  800 mg Oral 7am  
 dolutegravir (TIVICAY) tablet 50 mg  50 mg Oral 7am  
 ritonavir (NORVIR) tablet 100 mg  100 mg Oral 7am  
 ondansetron (ZOFRAN) injection 4 mg  4 mg IntraVENous Q6H PRN  
 bisacodyl (DULCOLAX) tablet 5 mg  5 mg Oral DAILY PRN  
 fentaNYL citrate (PF) injection 50 mcg  50 mcg IntraVENous Q2H PRN Objective:  
 
Vitals:  
 02/24/19 1915 02/24/19 1931 02/24/19 1945 02/24/19 2001 BP:  157/76  130/61 Pulse: 77 79 79 80 Resp: 16 18 15 18 Temp:      
SpO2: 100% 100% 100% 100% Weight:      
  
  
Intake and Output: 
Current Shift: No intake/output data recorded. Last 24 hr: 02/23 0701 - 02/24 0700 In: 5529.1 [I.V.:4387.1] Out: [address] [facility] [[id number]] IO: [id number] TOTAL OVERALL: +36189 Physical Exam:  
General:  Pt drowsy this am, remains intubated. Eyes:  PERRL +3 Neck: Supple, symmetrical, trachea midline, no adenopathy, thyroid: no enlargment/tenderness/nodules, no carotid bruit and no JVD. Lungs:   Decreased bilat. Heart:  Regular rate and rhythm, S1, S2 normal, no murmur, click, rub or gallop. Abdomen:   Soft, non-tender. Bowel sounds normal. No masses,  No organomegaly. Extremities: Extremities normal, atraumatic, no cyanosis or edema. Pulses: 2+ and symmetric all extremities. Skin: Skin color, texture, turgor normal. No rashes or lesions Neurologic: Pt drowsy with Left sided weakness greater today. She will follow on R intermittent. No sedation. Repeat CTA head neg. Lab/Data Review: 
 
Recent Results (from the past 12 hour(s)) SODIUM Collection Time: 02/24/19  9:24 AM  
Result Value Ref Range Sodium 155 (H) 136 - 145 mmol/L  
POTASSIUM Collection Time: 02/24/19  9:24 AM  
Result Value Ref Range  Potassium 3.3 (L) 3.5 - 5.1 mmol/L  
 GLUCOSE, POC Collection Time: 19 11:35 AM  
Result Value Ref Range Glucose (POC) 173 (H) 65 - 100 mg/dL SODIUM Collection Time: 19  3:55 PM  
Result Value Ref Range Sodium 156 (H) 136 - 145 mmol/L  
GLUCOSE, POC Collection Time: 19  6:14 PM  
Result Value Ref Range Glucose (POC) 190 (H) 65 - 100 mg/dL Assessment/ Plan:  
 
Principal Problem: 
  Nontraumatic subarachnoid hemorrhage from right middle cerebral artery ([address] (2019) Active Problems: 
  Acute cystitis without hematuria (2019) Pneumonia of left lung due to infectious organism (2019) Nontraumatic subcortical hemorrhage of right cerebral hemisphere ([address] (2019) Communicating hydrocephalus (2019) Elevated troponin (2019) Cerebral edema ([address] (2019) Midline shift of brain (2019) NEURO:Pt has [address] with IVH/R ICH from large right MCA aneurysm s/p coiling 2019. Admitted to ICU. Q1 hour neuro checks. On SAH protocol - nimotop, Mg, lipitor. Hoang Mcdaniel 4, Macias  Grade 4. CT head shows stable right frontal ICH with 7mm midline shift and IVH. D/C 3%. CTA shows no filling of aneurysm and no obvious vasospasm. Pt drowsy this am, left weaker than yesterday, did not want to follow consistent. Sent pt for CT head and CTA/CTP head, no acute changes. MRI brain shows Acute infarct R parietal lobe, small punctate infarcts right temporal/parietal areas as well and lacunar infarct right cerebellar Hemisphere - all of which are small and unlikely to cause any significant deficit. Pt given 650mg po asa today, will start 81mg po daily asa tomorrow and asa response in am.  
RESP: Pt intubated with 7.5 OETT, 22 at teeth, intact with breath sound bilat. No distress. Chest CT done upon arrival: MERY, density LLL and small left pleural effusion. Pt on PRVC @ 40%, P8, R15: AB.2//173, decreased rate to 12. Has respiratory alkalosis due to metabolic acidosis. CV:SBP goal <180. cardene gtt prn, 2D Echo: EF 40-45%, stress induced cardiomyopathy consistent with Takosubo. Trop peaked at 6.4, has trended back down, last one 1.4.+ murmur noted on exam. HEME: HH 9/27.  .  heparin 5000 units to bid.  
NEPH: bun/cr: 38/2.25  (hx of CKD). GI: TF today as tolerated. Pepcid. CK, triglycerides and LFTs are okay. ID: [id number].  CT chest shows MERY pneumonia, will pan cx and start cefepime/vanc D5/10,. Pan cx Neg thus far, UA with 12531 gram neg, will cont abx as pt with Chest CT MERY/LLL infiltrates. Cont monitor. LINES: Left IJ quad, left art line. Shmuel.  
  
 
 
 
Signed By: Jess Garcia NP February 24, 2019

## 2019-02-25 NOTE — PROGRESS NOTES
Bedside and Verbal shift change report given to [address] [place] [address] (oncoming nurse) by Kevon Buchanan (offgoing nurse). Report included the following information SBAR, Kardex, ED Summary, OR Summary, Intake/Output, MAR and Recent Results. Dual neuro assessment performed.

## 2019-02-25 NOTE — PROGRESS NOTES
Pt remains intubated, does not open her eyes or follow any commands. Pt is not appropriate for OT evaluation at this time, will follow up as schedule/ pt's status allows.  
 
Nany Bullock, OT

## 2019-02-25 NOTE — PROGRESS NOTES
PM&R Consult Progress Note Patient: Zeinab Garcia Admit Date: 2/20/2019 Admit Diagnosis: SAH (subarachnoid hemorrhage) ([facility] [address] [I60.9] Recommendations: Continue Acute Rehab Program, Coordination of rehab/medical care, Counseling of PM & R care issues management  
-MRI; R parietal infarct, small punctate infarct right cerebellum, right temporoparietal ; ASA/lipitor 
-more sedated this a.m. On no sedation 
- CT stable this a.m. W/ 7mm shift, and IVH, right frontal hemorrhage. 3d post R MCA aneurysm coiling. Remains intubated with low grade fever and leukocytosis; Vanc/Maxipime 
-CKD stable, Na unchanged at 155, CV stable with goal SBP< 180; 2D Echo: EF 40-45%, stress induced cardiomyopathy consistent with Takosubo. -PT/OT/ST as able to participate. IRC will continue to follow History/Subjective/Complaint:  
 
Patient seen and examined. Records reviewed. Lethargic on vent Pain 1 Pain Scale 1: Adult Nonverbal Pain Scale (02/25/19 0700) Pain Intensity 1: 0 (02/25/19 0700) Patient Stated Pain Goal: Unable to verbalize/indicate pain (02/25/19 0700) Pain Reassessment 1: Patient sleeping (02/21/19 1149) Pain Intervention(s) 1: Medication (see MAR) (02/22/19 0941) Objective:  
 
Vitals: 
Patient Vitals for the past 8 hrs: 
 BP Temp Pulse Resp SpO2  
02/25/19 0940  99.4 °F (37.4 °C)     
02/25/19 0829 119/57  80    
02/25/19 0816   85 25 100 % 02/25/19 0804 148/68  88    
02/25/19 0801  98.6 °F (37 °C)     
02/25/19 0800 154/72  88 19 100 % 02/25/19 0745   92 25 100 % 02/25/19 0742  99 °F (37.2 °C)     
02/25/19 0739 154/68  92    
02/25/19 0731 146/70  95 24 100 % 02/25/19 0715   93 24 100 % 02/25/19 0700 147/70 100 °F (37.8 °C) 91 22 100 % 02/25/19 0645   85 25 100 % 02/25/19 0631 155/74  85 21 100 % 02/25/19 0615   88 21 100 % 02/25/19 0600 156/75  85 21 100 % 02/25/19 0545   86 21 100 % 02/25/19 0531 157/74  85 21 100 % 02/25/19 0515   83 21 100 % 02/25/19 0500 164/79  85 17 100 % 02/25/19 0445   79 20 100 % 02/25/19 0431 159/77  77 16 100 % 02/25/19 0415   81 20 100 % 02/25/19 0400 149/71  78 21 100 % 02/25/19 0345   74 21 100 % 02/25/19 0331 128/60  78 23 100 % 02/25/19 0315   83 21 100 % 02/25/19 0300 145/69  87 20 100 % 02/25/19 0245   92 23 100 % 02/25/19 0231 143/70  81 20 100 % 02/25/19 0215   80 21 100 % 02/25/19 0200 148/71  86 21 100 % Intake and Output: 
02/23 1901 - 02/25 0700 In: 5454.1 [I.V.:3089.1] Out: 4425 [RI:9527] Allergies Allergen Reactions  Codeine Itching Ear itch, can't breath  Pcn [Penicillins] Rash and Itching Current Facility-Administered Medications Medication Dose Route Frequency  aspirin chewable tablet 81 mg  81 mg Per NG tube DAILY  ibuprofen (MOTRIN) tablet 600 mg  600 mg Per NG tube Q4H PRN  
 acetaminophen (TYLENOL) tablet 650 mg  650 mg Per NG tube Q4H PRN  niCARdipine (CARDENE) 25 mg in 0.9% sodium chloride 250 mL infusion  0-15 mg/hr IntraVENous TITRATE  vancomycin (VANCOCIN) 750 mg in  ml infusion  750 mg IntraVENous Q24H  
 0.9% sodium chloride infusion  50 mL/hr IntraVENous CONTINUOUS  
 heparin (porcine) injection 5,000 Units  5,000 Units SubCUTAneous Q12H  
 lansoprazole (PREVACID SOLUTAB) disintegrating tablet 30 mg  30 mg Per NG tube ACB  magnesium oxide (MAG-OX) tablet 400 mg  400 mg Per NG tube BID  senna-docusate (PERICOLACE) 8.6-50 mg per tablet 2 Tab  2 Tab Per NG tube QHS  venlafaxine (EFFEXOR) tablet 50 mg  50 mg Per NG tube TID WITH MEALS  abacavir (ZIAGEN) 20 mg/mL oral solution 300 mg  300 mg Per G Tube Q12H  
 heparin (PF) 2 units/ml in NS infusion 2,000 Units  1,000 mL Irrigation PRN  
 0.9% sodium chloride infusion 250 mL  250 mL IntraVENous PRN  
 midazolam (VERSED) injection 2-4 mg  2-4 mg IntraVENous Multiple  0.9% sodium chloride infusion 250 mL  250 mL IntraVENous PRN  
 cefepime (MAXIPIME) 1 g in 0.9% sodium chloride (MBP/ADV) 50 mL ADV  1 g IntraVENous Q24H  
 insulin lispro (HUMALOG) injection   SubCUTAneous Q6H  
 polyvinyl alcohol (LIQUIFILM TEARS) 1.4 % ophthalmic solution 1 Drop  1 Drop Both Eyes PRN  niMODipine (NIMOTOP) 30 mg/mL oral solution (compound) 30 mg  30 mg Oral Q4H  
 NUTRITIONAL SUPPORT ELECTROLYTE PRN ORDERS   Does Not Apply PRN  
 darunavir ethanolate (PREZISTA) tablet 800 mg  800 mg Oral 7am  
 dolutegravir (TIVICAY) tablet 50 mg  50 mg Oral 7am  
 ritonavir (NORVIR) tablet 100 mg  100 mg Oral 7am  
 ondansetron (ZOFRAN) injection 4 mg  4 mg IntraVENous Q6H PRN  
 bisacodyl (DULCOLAX) tablet 5 mg  5 mg Oral DAILY PRN  
 fentaNYL citrate (PF) injection 50 mcg  50 mcg IntraVENous Q2H PRN Physical Exam: 
Remains intubated Very somnolent; doesn't open eyes but does resist me slightly when I open them Pupils 4R, L5  and sluggishly reactive on the left vs right Hypertonicity all 4 extremities; L>R with PROM; ?decerebrate posturing RUE with painful stimulation Toes up on right plantar stimulation Not fcs for me Withdraws to pain all 4 but delayed and minimal on left Incision(s)/Wound(s): Wound Gluteal fold / cleft (Active) Number of days: 6197 Wound Arm Right;Upper (Active) Number of days: 4241 Wound Arm Right (Active) Number of days: 5899 Functional Assessment: 
Gross Assessment AROM: Grossly decreased, non-functional (02/23/19 1000) Strength: Grossly decreased, non-functional (02/23/19 1000) Coordination: Grossly decreased, non-functional (02/23/19 1000) Tone: Normal (02/23/19 1000) Sensation: Intact (02/23/19 1000) Bed Mobility Rolling: Total assistance (02/23/19 1000) Supine to Sit: Total assistance (02/23/19 1000) Sit to Supine: Total assistance (02/23/19 1000) Balance Sitting: Impaired;High guard (02/23/19 1000) Sitting - Static: Poor (constant support) (02/23/19 1000) Sitting - Dynamic: Poor (constant support) (02/23/19 1000) Bed/Mat Mobility Rolling: Total assistance (02/23/19 1000) Supine to Sit: Total assistance (02/23/19 1000) Sit to Supine: Total assistance (02/23/19 1000) Labs/Studies: 
Recent Results (from the past 72 hour(s)) TYPE + CROSSMATCH Collection Time: 02/22/19 12:39 PM  
Result Value Ref Range Crossmatch Expiration 02/25/2019 ABO/Rh(D) O POSITIVE Antibody screen NEG Unit number [id number] Blood component type  LR Unit division 00 Status of unit ALLOCATED Crossmatch result Compatible Unit number [id number] Blood component type  LR Unit division 00 Status of unit TRANSFUSED Crossmatch result Compatible Unit number [id number] Blood component type  LR Unit division 00 Status of unit ALLOCATED Crossmatch result Compatible HGB & HCT Collection Time: 02/22/19 12:50 PM  
Result Value Ref Range HGB 7.4 (L) 11.7 - 15.4 g/dL HCT 23.7 (L) 35.8 - 46.3 % GLUCOSE, POC Collection Time: 02/22/19  3:25 PM  
Result Value Ref Range Glucose (POC) 128 (H) 65 - 100 mg/dL SODIUM Collection Time: 02/22/19  3:30 PM  
Result Value Ref Range Sodium 150 (H) 136 - 145 mmol/L  
POC G3 Collection Time: 02/22/19  3:33 PM  
Result Value Ref Range Device: VENT    
 FIO2 (POC) 100 % pH (POC) 7.219 (LL) 7.35 - 7.45    
 pCO2 (POC) 38.1 35 - 45 MMHG  
 pO2 (POC) 401 (H) 75 - 100 MMHG  
 HCO3 (POC) 15.6 (L) 22 - 26 MMOL/L  
 sO2 (POC) 100 (H) 95 - 98 % Base deficit (POC) 11 mmol/L Mode Pressure regulated volume control Tidal volume 450 ml Set Rate 15 bpm  
 PEEP/CPAP (POC) 8 cmH2O Allens test (POC) NOT APPLICABLE Site DRAWN FROM ARTERIAL LINE Patient temp. 98.6 Specimen type (POC) ARTERIAL  Performed by Nicolas   
 CO2, POC 17 MMOL/L  
 Respiratory comment: PhysicianNotified Exhaled minute volume 8.30 L/min COLLECT TIME 1,533 HGB & HCT Collection Time: 02/22/19  4:44 PM  
Result Value Ref Range HGB 8.5 (L) 11.7 - 15.4 g/dL HCT 26.7 (L) 35.8 - 46.3 % GLUCOSE, POC Collection Time: 02/22/19  5:17 PM  
Result Value Ref Range Glucose (POC) 108 (H) 65 - 100 mg/dL SODIUM Collection Time: 02/22/19  9:23 PM  
Result Value Ref Range Sodium 152 (H) 136 - 145 mmol/L  
POTASSIUM Collection Time: 02/22/19  9:23 PM  
Result Value Ref Range Potassium 3.6 3.5 - 5.1 mmol/L  
GLUCOSE, POC Collection Time: 02/23/19 12:00 AM  
Result Value Ref Range Glucose (POC) 107 (H) 65 - 100 mg/dL CBC W/O DIFF Collection Time: 02/23/19  3:01 AM  
Result Value Ref Range WBC 12.6 (H) 4.3 - 11.1 K/uL  
 RBC 2.98 (L) 4.05 - 5.2 M/uL HGB 9.2 (L) 11.7 - 15.4 g/dL HCT 29.4 (L) 35.8 - 46.3 % MCV 98.7 (H) 79.6 - 97.8 FL  
 MCH 30.9 26.1 - 32.9 PG  
 MCHC 31.3 (L) 31.4 - 35.0 g/dL  
 RDW 16.3 (H) 11.9 - 14.6 % PLATELET 347 (L) 585 - 450 K/uL MPV 9.8 9.4 - 12.3 FL ABSOLUTE NRBC 0.00 0.0 - 0.2 K/uL METABOLIC PANEL, BASIC Collection Time: 02/23/19  3:01 AM  
Result Value Ref Range Sodium 151 (H) 136 - 145 mmol/L Potassium 3.4 (L) 3.5 - 5.1 mmol/L Chloride 123 (H) 98 - 107 mmol/L  
 CO2 14 (L) 21 - 32 mmol/L Anion gap 14 7 - 16 mmol/L Glucose 128 (H) 65 - 100 mg/dL BUN 36 (H) 6 - 23 MG/DL Creatinine 2.21 (H) 0.6 - 1.0 MG/DL  
 GFR est AA 29 (L) >60 ml/min/1.73m2 GFR est non-AA 24 (L) >60 ml/min/1.73m2 Calcium 7.4 (L) 8.3 - 10.4 MG/DL MAGNESIUM Collection Time: 02/23/19  3:01 AM  
Result Value Ref Range Magnesium 2.5 (H) 1.8 - 2.4 mg/dL [place] Collection Time: 02/23/19  3:01 AM  
Result Value Ref Range Vancomycin, random 17.1 UG/ML  
POC G3 Collection Time: 02/23/19  3:21 AM  
Result Value Ref Range Device: VENT    
 FIO2 (POC) 50 % pH (POC) 7.275 (L) 7.35 - 7.45    
 pCO2 (POC) 27.2 (L) 35 - 45 MMHG  
 pO2 (POC) 185 (H) 75 - 100 MMHG  
 HCO3 (POC) 12.6 (L) 22 - 26 MMOL/L  
 sO2 (POC) 100 (H) 95 - 98 % Base deficit (POC) 13 mmol/L Mode Pressure regulated volume control Tidal volume 450 ml Set Rate 15 bpm  
 PEEP/CPAP (POC) 8 cmH2O Allens test (POC) NOT APPLICABLE Inspiratory Time 0.9 sec Site DRAWN FROM ARTERIAL LINE Patient temp. 98.6 Specimen type (POC) ARTERIAL Performed by Rikki   
 CO2, POC 13 MMOL/L Respiratory comment: NurseNotified Exhaled minute volume 8.10 L/min COLLECT TIME 315 GLUCOSE, POC Collection Time: 02/23/19  5:17 AM  
Result Value Ref Range Glucose (POC) 118 (H) 65 - 100 mg/dL SODIUM Collection Time: 02/23/19  9:29 AM  
Result Value Ref Range Sodium 152 (H) 136 - 145 mmol/L  
POTASSIUM Collection Time: 02/23/19  9:29 AM  
Result Value Ref Range Potassium 3.6 3.5 - 5.1 mmol/L  
GLUCOSE, POC Collection Time: 02/23/19 11:08 AM  
Result Value Ref Range Glucose (POC) 130 (H) 65 - 100 mg/dL CK Collection Time: 02/23/19 11:38 AM  
Result Value Ref Range  (H) 21 - 215 U/L  
TRIGLYCERIDE Collection Time: 02/23/19 11:38 AM  
Result Value Ref Range Triglyceride 343 (H) 35 - 150 MG/DL  
HEPATIC FUNCTION PANEL Collection Time: 02/23/19 11:38 AM  
Result Value Ref Range Protein, total 5.7 (L) 6.3 - 8.2 g/dL Albumin 2.7 (L) 3.5 - 5.0 g/dL Globulin 3.0 2.3 - 3.5 g/dL A-G Ratio 0.9 (L) 1.2 - 3.5 Bilirubin, total 0.5 0.2 - 1.1 MG/DL Bilirubin, direct 0.2 <0.4 MG/DL Alk. phosphatase 79 50 - 136 U/L  
 AST (SGOT) 67 (H) 15 - 37 U/L  
 ALT (SGPT) 56 12 - 65 U/L  
POC G3 Collection Time: 02/23/19  2:16 PM  
Result Value Ref Range Device: VENT    
 FIO2 (POC) 40 % pH (POC) 7.372 7.35 - 7.45    
 pCO2 (POC) 26.4 (L) 35 - 45 MMHG  
 pO2 (POC) 175 (H) 75 - 100 MMHG HCO3 (POC) 15.4 (L) 22 - 26 MMOL/L  
 sO2 (POC) 100 (H) 95 - 98 % Base deficit (POC) 9 mmol/L Mode Pressure regulated volume control Tidal volume 450 ml Set Rate 15 bpm  
 PEEP/CPAP (POC) 8 cmH2O Allens test (POC) NOT APPLICABLE Site DRAWN FROM ARTERIAL LINE Patient temp. 98.6 Specimen type (POC) ARTERIAL Performed by Nicolas   
 CO2, POC 16 MMOL/L Respiratory comment: PhysicianNotified Exhaled minute volume 13.80 L/min COLLECT TIME 1,416 SODIUM Collection Time: 02/23/19  3:19 PM  
Result Value Ref Range Sodium 157 (H) 136 - 145 mmol/L  
GLUCOSE, POC Collection Time: 02/23/19  5:34 PM  
Result Value Ref Range Glucose (POC) 125 (H) 65 - 100 mg/dL SODIUM Collection Time: 02/23/19 10:29 PM  
Result Value Ref Range Sodium 158 (H) 136 - 145 mmol/L  
GLUCOSE, POC Collection Time: 02/23/19 11:18 PM  
Result Value Ref Range Glucose (POC) 164 (H) 65 - 100 mg/dL CBC W/O DIFF Collection Time: 02/24/19  3:13 AM  
Result Value Ref Range WBC 11.1 4.3 - 11.1 K/uL  
 RBC 2.87 (L) 4.05 - 5.2 M/uL HGB 9.1 (L) 11.7 - 15.4 g/dL HCT 27.7 (L) 35.8 - 46.3 % MCV 96.5 79.6 - 97.8 FL  
 MCH 31.7 26.1 - 32.9 PG  
 MCHC 32.9 31.4 - 35.0 g/dL  
 RDW 15.7 (H) 11.9 - 14.6 % PLATELET 906 (L) 025 - 450 K/uL MPV 9.9 9.4 - 12.3 FL ABSOLUTE NRBC 0.00 0.0 - 0.2 K/uL METABOLIC PANEL, BASIC Collection Time: 02/24/19  3:13 AM  
Result Value Ref Range Sodium 158 (H) 136 - 145 mmol/L Potassium 3.0 (L) 3.5 - 5.1 mmol/L Chloride 129 (H) 98 - 107 mmol/L  
 CO2 18 (L) 21 - 32 mmol/L Anion gap 11 7 - 16 mmol/L Glucose 192 (H) 65 - 100 mg/dL BUN 38 (H) 6 - 23 MG/DL Creatinine 2.25 (H) 0.6 - 1.0 MG/DL  
 GFR est AA 29 (L) >60 ml/min/1.73m2 GFR est non-AA 24 (L) >60 ml/min/1.73m2 Calcium 7.6 (L) 8.3 - 10.4 MG/DL MAGNESIUM Collection Time: 02/24/19  3:13 AM  
Result Value Ref Range Magnesium 2.5 (H) 1.8 - 2.4 mg/dL POC G3 Collection Time: 02/24/19  3:26 AM  
Result Value Ref Range Device: VENT    
 FIO2 (POC) 40 % pH (POC) 7.387 7.35 - 7.45    
 pCO2 (POC) 27.8 (L) 35 - 45 MMHG  
 pO2 (POC) 173 (H) 75 - 100 MMHG  
 HCO3 (POC) 16.7 (L) 22 - 26 MMOL/L  
 sO2 (POC) 100 (H) 95 - 98 % Base deficit (POC) 7 mmol/L Mode ASSIST CONTROL Tidal volume 450 ml Set Rate 15 bpm  
 PEEP/CPAP (POC) 8 cmH2O Allens test (POC) YES Site DRAWN FROM ARTERIAL LINE Patient temp. 98.6 Specimen type (POC) ARTERIAL Performed by Mark   
 CO2, POC 18 MMOL/L Exhaled minute volume 11.40 L/min COLLECT TIME 325 GLUCOSE, POC Collection Time: 02/24/19  5:05 AM  
Result Value Ref Range Glucose (POC) 183 (H) 65 - 100 mg/dL SODIUM Collection Time: 02/24/19  9:24 AM  
Result Value Ref Range Sodium 155 (H) 136 - 145 mmol/L  
POTASSIUM Collection Time: 02/24/19  9:24 AM  
Result Value Ref Range Potassium 3.3 (L) 3.5 - 5.1 mmol/L  
GLUCOSE, POC Collection Time: 02/24/19 11:35 AM  
Result Value Ref Range Glucose (POC) 173 (H) 65 - 100 mg/dL SODIUM Collection Time: 02/24/19  3:55 PM  
Result Value Ref Range Sodium 156 (H) 136 - 145 mmol/L  
GLUCOSE, POC Collection Time: 02/24/19  6:14 PM  
Result Value Ref Range Glucose (POC) 190 (H) 65 - 100 mg/dL POTASSIUM Collection Time: 02/24/19  7:55 PM  
Result Value Ref Range Potassium 3.5 3.5 - 5.1 mmol/L  
SODIUM Collection Time: 02/24/19 10:14 PM  
Result Value Ref Range Sodium 157 (H) 136 - 145 mmol/L  
GLUCOSE, POC Collection Time: 02/24/19 11:22 PM  
Result Value Ref Range Glucose (POC) 227 (H) 65 - 100 mg/dL POC G3 Collection Time: 02/25/19  3:11 AM  
Result Value Ref Range Device: VENT    
 FIO2 (POC) 40 %  pH (POC) 7.409 7.35 - 7.45    
 pCO2 (POC) 27.6 (L) 35 - 45 MMHG  
 pO2 (POC) 130 (H) 75 - 100 MMHG  
 HCO3 (POC) 17.5 (L) 22 - 26 MMOL/L  
 sO2 (POC) 99 (H) 95 - 98 % Base deficit (POC) 7 mmol/L Mode Pressure regulated volume control Tidal volume 450 ml Set Rate 12 bpm  
 PEEP/CPAP (POC) 8 cmH2O Allens test (POC) NOT APPLICABLE Inspiratory Time 0.9 sec Site DRAWN FROM ARTERIAL LINE Patient temp. 98.6 Specimen type (POC) ARTERIAL Performed by Yuki EAGLE, POC 18 MMOL/L Respiratory comment: NurseNotified Exhaled minute volume 10.60 L/min COLLECT TIME 311 CBC W/O DIFF Collection Time: 02/25/19  3:28 AM  
Result Value Ref Range WBC 11.7 (H) 4.3 - 11.1 K/uL  
 RBC 2.89 (L) 4.05 - 5.2 M/uL HGB 9.1 (L) 11.7 - 15.4 g/dL HCT 27.8 (L) 35.8 - 46.3 % MCV 96.2 79.6 - 97.8 FL  
 MCH 31.5 26.1 - 32.9 PG  
 MCHC 32.7 31.4 - 35.0 g/dL  
 RDW 15.9 (H) 11.9 - 14.6 % PLATELET 881 (L) 432 - 450 K/uL MPV 9.6 9.4 - 12.3 FL ABSOLUTE NRBC 0.00 0.0 - 0.2 K/uL METABOLIC PANEL, BASIC Collection Time: 02/25/19  3:28 AM  
Result Value Ref Range Sodium 155 (H) 136 - 145 mmol/L Potassium 3.6 3.5 - 5.1 mmol/L Chloride 127 (H) 98 - 107 mmol/L  
 CO2 20 (L) 21 - 32 mmol/L Anion gap 8 7 - 16 mmol/L Glucose 227 (H) 65 - 100 mg/dL BUN 38 (H) 6 - 23 MG/DL Creatinine 2.26 (H) 0.6 - 1.0 MG/DL  
 GFR est AA 28 (L) >60 ml/min/1.73m2 GFR est non-AA 24 (L) >60 ml/min/1.73m2 Calcium 7.9 (L) 8.3 - 10.4 MG/DL MAGNESIUM Collection Time: 02/25/19  3:28 AM  
Result Value Ref Range Magnesium 2.4 1.8 - 2.4 mg/dL Larence Anderson Collection Time: 02/25/19  3:28 AM  
Result Value Ref Range Vancomycin,trough 18.6 5 - 20 ug/mL ASPIRIN TEST Collection Time: 02/25/19  3:28 AM  
Result Value Ref Range Aspirin test 350 (L) 620 - 672 ARU  
GLUCOSE, POC Collection Time: 02/25/19  5:02 AM  
Result Value Ref Range Glucose (POC) 205 (H) 65 - 100 mg/dL Assessment:  
 
Principal Problem: Nontraumatic subarachnoid hemorrhage from right middle cerebral artery ([address] (2/20/2019) Active Problems: 
  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  
 
Recommendations: Continue Acute Rehab Program 
Coordination of rehab/medical care Counseling of PM & R care issues management Monitoring and management of medical conditions per plan of care/orders Discussion with Family/Caregiver/Staff Reviewed Therapies/Labs/Medications/Records Signed By:  Chiqui Larson MD   
 February 25, 2019

## 2019-02-25 NOTE — PROGRESS NOTES
Pt sent for CT head and CTA/CTP of head this am due to decreased LOC, new onset LUE weakness and not following commands. Pt with baseline CKD, bun/cr this am: 38/2. 25. Discussed pt with Dr. Halie Mcclain pt's creatinine and that this is acute change and emergent images required. Pt with CKD and this is her baseline. Family aware of pt critical prognosis and need for imaging. Dr. Sara Silva updated and pt requires imaging for clinical exam changes and to monitor for any acute vasospasm or other changes.

## 2019-02-25 NOTE — PROGRESS NOTES
Chart reviewed as pt remains in ICU intubated. More drowsy this am and not following commands per MD notes. CM following for any assist and d/c POC.

## 2019-02-25 NOTE — PROGRESS NOTES
Ventilator check complete; patient has a #7.5 ET tube secured at the 24 at the lip, bite block in place. Patient is not sedated. Patient is not able to follow commands. Breath sounds are coarse. Trachea is midline, Negative for subcutaneous air, and chest excursion is symmetric. Patient is also Negative for cyanosis and is Positive for pitting edema. All alarms are set and audible. Resuscitation bag is at the head of the bed. Ventilator Settings Mode FIO2 Rate Tidal Volume Pressure PEEP I:E Ratio PRVC  40 %    450 ml     8 cm H20  1:4.5 Peak airway pressure: 12 cm H2O Minute ventilation: 10.5 l/min ABG: No results for input(s): PH, PCO2, PO2, HCO3 in the last 72 hours. Rex Older

## 2019-02-25 NOTE — PROGRESS NOTES
A follow up visit was made to the patient. Emotional support, spiritual presence and  
prayer were provided. This patient was not alert. Franky Burton

## 2019-02-25 NOTE — PROGRESS NOTES
Dr. Roxanne Prasad and Zuleika Abreu, NP at bedside. Pt continues to respond to pain in all extremities. Occasional spontaneous movement in upper extremities, not to command. L pupil > R pupil. MD to place orders for keppra. Dr. Roxanne Prasad called and updated daughter, Diana Weber.

## 2019-02-25 NOTE — PROGRESS NOTES
Zuleika Abreu NP called about pt neuro status. Pt. Not following commands and not opening eyes to stimulus. Stated she will be down to see pt soon.

## 2019-02-26 PROBLEM — R56.1 SEIZURE AFTER HEAD INJURY (HCC): Status: ACTIVE | Noted: 2019-01-01

## 2019-02-26 NOTE — PROCEDURES
EEG 
 
Study is performed in the intensive care unit using a multichannel digital EEG device in a patient with subarachnoid hemorrhage. The overall background rhythm is for the most part 4-5 cps with a degree of amplitude asymmetry with that on the right side being greater in amplitude than that on the left there is irregular delta activity seen over the hemispheres bilaterally but it is more pronounced on the right side. There are occasional right hemispheric sharp events but there is no evidence of persistent ictal discharges. Impression Abnormal EEG consistent with a degree of profound encephalopathy superimposed upon which is additional involvement in the right hemicerebral.  There is some residual evidence of ictal activity in the right brain in particular overall there is a degree of enhanced cortical irritability and instability

## 2019-02-26 NOTE — PROGRESS NOTES
Ventilator check complete; patient has a #7.5 ET tube secured at the 24 at the teeth. Patient is sedated. Patient is not able to follow commands. Breath sounds are coarse. Trachea is midline, Negative for subcutaneous air, and chest excursion is symmetric. Patient is also Negative for cyanosis and is Positive for pitting edema. All alarms are set and audible. Resuscitation bag is at the head of the bed. Ventilator Settings Mode FIO2 Rate Tidal Volume Pressure PEEP I:E Ratio PRVC  40 %    450 ml     8 cm H20  1:4.5 Peak airway pressure: 18 cm H2O Minute ventilation: 11.6 l/min ABG: No results for input(s): PH, PCO2, PO2, HCO3 in the last 72 hours. Raj Chun

## 2019-02-26 NOTE — PROGRESS NOTES
Bedside report received from [facility]. Pt is intubated with oral endotracheal tube; PRVC mode with 40% Fi02, Peep 8, RR12. Lung sounds are clear. Dual neuro assessment completed with [facility]. Pt will not open eyes or follow commands. Pt moves extremities to pain and stimulation. R pupil 4mm and reactive; L pupil 5mm and reactive. Pulses palpable and present all extremities. NGT in place and infusing tube feedings at goal. Abdomen soft and intact with active bowel sounds. Mi in place draining yellow clear urine. VSS with cardene gtt at 5mg/hr. Restraints in use with current order. Will continue to monitor.

## 2019-02-26 NOTE — INTERDISCIPLINARY ROUNDS
Interdisciplinary team rounds were held 2/26/2019 with the following team members:Care Management, Nursing, Nurse Practitioner, Nutrition, Occupational Therapy, Palliative Care, Pastoral Care, Pharmacy, Physical Therapy, Physician, Physician's Assistant, Respiratory Therapy and Clinical Coordinator and the patient. Plan of care discussed. See clinical pathway and/or care plan for interventions and desired outcomes.

## 2019-02-26 NOTE — PROGRESS NOTES
PT note: Per nursing, patient on hold today due to new onset of seizures. Will check back tomorrow to determine appropriateness for therapy. Thank you.  
 
 
DEVENDRA OlivaT

## 2019-02-26 NOTE — PROGRESS NOTES
Occupational Therapy Note:  Orders received, chart reviewed, but per [facility], NP, pt with new onset seizures, and we should Hold therapy evaluation today. Will check back later as time and schedule allows.   Thank you, Claudene Decant, MS, OTR/L

## 2019-02-26 NOTE — PROGRESS NOTES
Ventilator check complete; patient has a #7.5 ET tube secured at the 24 at the teeth. Patient is not able to follow commands. Breath sounds are coarse. Trachea is midline, Negative for subcutaneous air, and chest excursion is symmetric. Patient is also Negative for cyanosis and is Negative for pitting edema. All alarms are set and audible. Resuscitation bag is at the head of the bed. Ventilator Settings Mode FIO2 Rate Tidal Volume Pressure PEEP I:E Ratio PRVC  40 %   12 breaths/m 450 ml     8 cm H20  1:4.5 Peak airway pressure: 17 cm H2O Minute ventilation: 11.6 l/min [facility] 18

## 2019-02-26 NOTE — PROGRESS NOTES
Progress Note Patient: Chico Waggoner MRN: [id number]  SSN: [id number] YOB: 1960  Age: 60 y.o. Sex: female Admit Date: 2/20/2019 LOS: 6 days Subjective:  
Pt remains drowsy today, she does not follow commands. Intubated but has not been on any sedation >48hrs. Pt with noted increased nystagmus with left lip/facial twitching, all concerning for seizure activity, pt has been on keppra 750mg IV bid. EEG completed and final read pending. Increased Keppra to 1000mg IV bid and added Vimpat 200 mg IV bid this am. 
 
 
Current Facility-Administered Medications Medication Dose Route Frequency  [START ON 2/27/2019] vancomycin (VANCOCIN) 750 mg in  ml infusion  750 mg IntraVENous Q24H  
 levETIRAcetam (KEPPRA) 1,000 mg in 0.9% sodium chloride 100 mL IVPB  1,000 mg IntraVENous Q12H  
 lacosamide (VIMPAT) 200 mg in 0.9% sodium chloride 100 mL IVPB  200 mg IntraVENous Q12H  
 metoprolol tartrate (LOPRESSOR) tablet 50 mg  50 mg Per NG tube BID  aspirin chewable tablet 81 mg  81 mg Per NG tube DAILY  ibuprofen (MOTRIN) tablet 600 mg  600 mg Per NG tube Q4H PRN  
 acetaminophen (TYLENOL) tablet 650 mg  650 mg Per NG tube Q4H PRN  niCARdipine (CARDENE) 25 mg in 0.9% sodium chloride 250 mL infusion  0-15 mg/hr IntraVENous TITRATE  
 0.9% sodium chloride infusion  50 mL/hr IntraVENous CONTINUOUS  
 heparin (porcine) injection 5,000 Units  5,000 Units SubCUTAneous Q12H  
 lansoprazole (PREVACID SOLUTAB) disintegrating tablet 30 mg  30 mg Per NG tube ACB  magnesium oxide (MAG-OX) tablet 400 mg  400 mg Per NG tube BID  senna-docusate (PERICOLACE) 8.6-50 mg per tablet 2 Tab  2 Tab Per NG tube QHS  venlafaxine (EFFEXOR) tablet 50 mg  50 mg Per NG tube TID WITH MEALS  abacavir (ZIAGEN) 20 mg/mL oral solution 300 mg  300 mg Per G Tube Q12H  
 heparin (PF) 2 units/ml in NS infusion 2,000 Units  1,000 mL Irrigation PRN  
  0.9% sodium chloride infusion 250 mL  250 mL IntraVENous PRN  
 0.9% sodium chloride infusion 250 mL  250 mL IntraVENous PRN  
 cefepime (MAXIPIME) 1 g in 0.9% sodium chloride (MBP/ADV) 50 mL ADV  1 g IntraVENous Q24H  
 insulin lispro (HUMALOG) injection   SubCUTAneous Q6H  
 polyvinyl alcohol (LIQUIFILM TEARS) 1.4 % ophthalmic solution 1 Drop  1 Drop Both Eyes PRN  niMODipine (NIMOTOP) 30 mg/mL oral solution (compound) 30 mg  30 mg Oral Q4H  
 NUTRITIONAL SUPPORT ELECTROLYTE PRN ORDERS   Does Not Apply PRN  
 darunavir ethanolate (PREZISTA) tablet 800 mg  800 mg Oral 7am  
 dolutegravir (TIVICAY) tablet 50 mg  50 mg Oral 7am  
 ritonavir (NORVIR) tablet 100 mg  100 mg Oral 7am  
 ondansetron (ZOFRAN) injection 4 mg  4 mg IntraVENous Q6H PRN  
 bisacodyl (DULCOLAX) tablet 5 mg  5 mg Oral DAILY PRN  
 fentaNYL citrate (PF) injection 50 mcg  50 mcg IntraVENous Q2H PRN Objective:  
 
Vitals:  
 02/26/19 0830 02/26/19 0901 02/26/19 0930 02/26/19 1001 BP: 146/69 149/72 153/75 158/77 Pulse: 79 78 79 75 Resp: 23 21 22 22 Temp:      
SpO2: 100% 100% 100% 100% Weight:      
  
  
Intake and Output: 
Current Shift: 02/26 0701 - 02/26 1900 In: -  
Out: 525 [Urine:525] Last 24 hr: 02/25 0701 - 02/26 0700 In: 3886.3 [I.V.:2037.3] Out: 2700 [Urine:2700] IO: +2188 TOTAL overall: +29645 Physical Exam:  
General:  Pt drowsy this am, not following commands. Eyes:  Increased nystagmus bilat. Neck: Supple, symmetrical, trachea midline, no adenopathy, thyroid: no enlargment/tenderness/nodules, no carotid bruit and no JVD. Lungs:   Decreased bases. intubated Heart:  Regular rate and rhythm, S1, S2 normal, no murmur, click, rub or gallop. Abdomen:   Soft. Bowel sounds normal. No masses,  No organomegaly. Extremities: Extremities normal, atraumatic, no cyanosis, +2 BUE edema. Pulses: 2+ and symmetric all extremities. Skin: Skin color, texture, turgor normal. No rashes or lesions Neurologic: Pt more drowsy this am, EEG for suspected break through seizures, increased keppra and added vimpat this am. Will cont monitor. Lab/Data Review: 
 
Recent Results (from the past 12 hour(s)) GLUCOSE, POC Collection Time: 02/25/19 11:19 PM  
Result Value Ref Range Glucose (POC) 168 (H) 65 - 100 mg/dL CBC W/O DIFF Collection Time: 02/26/19  3:07 AM  
Result Value Ref Range WBC 11.8 (H) 4.3 - 11.1 K/uL  
 RBC 2.95 (L) 4.05 - 5.2 M/uL HGB 9.3 (L) 11.7 - 15.4 g/dL HCT 28.8 (L) 35.8 - 46.3 % MCV 97.6 79.6 - 97.8 FL  
 MCH 31.5 26.1 - 32.9 PG  
 MCHC 32.3 31.4 - 35.0 g/dL  
 RDW 16.0 (H) 11.9 - 14.6 % PLATELET 120 (L) 232 - 450 K/uL MPV 9.4 9.4 - 12.3 FL ABSOLUTE NRBC 0.00 0.0 - 0.2 K/uL METABOLIC PANEL, BASIC Collection Time: 02/26/19  3:07 AM  
Result Value Ref Range Sodium 156 (H) 136 - 145 mmol/L Potassium 3.5 3.5 - 5.1 mmol/L Chloride 127 (H) 98 - 107 mmol/L  
 CO2 20 (L) 21 - 32 mmol/L Anion gap 9 7 - 16 mmol/L Glucose 183 (H) 65 - 100 mg/dL BUN 41 (H) 6 - 23 MG/DL Creatinine 2.20 (H) 0.6 - 1.0 MG/DL  
 GFR est AA 29 (L) >60 ml/min/1.73m2 GFR est non-AA 24 (L) >60 ml/min/1.73m2 Calcium 7.7 (L) 8.3 - 10.4 MG/DL MAGNESIUM Collection Time: 02/26/19  3:07 AM  
Result Value Ref Range Magnesium 2.3 1.8 - 2.4 mg/dL POC G3 Collection Time: 02/26/19  3:47 AM  
Result Value Ref Range Device: VENT    
 FIO2 (POC) 40 % pH (POC) 7.420 7.35 - 7.45    
 pCO2 (POC) 27.4 (L) 35 - 45 MMHG  
 pO2 (POC) 110 (H) 75 - 100 MMHG  
 HCO3 (POC) 17.8 (L) 22 - 26 MMOL/L  
 sO2 (POC) 98 95 - 98 % Base deficit (POC) 6 mmol/L Mode Pressure regulated volume control Tidal volume 450 ml Set Rate 12 bpm  
 PEEP/CPAP (POC) 8 cmH2O Allens test (POC) NOT APPLICABLE Inspiratory Time 0.9 sec Site DRAWN FROM ARTERIAL LINE Patient temp. 98.6 Specimen type (POC) ARTERIAL Performed by [facility]   
 CO2, POC 19 MMOL/L Exhaled minute volume 12.00 L/min COLLECT TIME 344 GLUCOSE, POC Collection Time: 02/26/19  5:35 AM  
Result Value Ref Range Glucose (POC) 180 (H) 65 - 100 mg/dL Assessment/ Plan:  
 
Principal Problem: 
  Nontraumatic subarachnoid hemorrhage from right middle cerebral artery ([address] (2/20/2019) Active Problems: 
  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 Admitted to ICU. Q1 hour neuro checks. On SAH protocol - nimotop, Mg, lipitor. Hoang Mcdaniel 4, Macias  Grade 4. CT head shows stable right frontal ICH with 7mm midline shift and IVH. CTA shows no filling of aneurysm and no obvious vasospasm.  CTP was normal. Pt drowsy still and not following commands. MRI brain showed only small HITS mainly in the right ICA territory and nothing that would explain her being drowsy or weak.  ASA response: 350, started on 81mg po daily. EEG obtained this am, pending final read. I suspect seizure activity may be why she is so sleepy. Increased Keppra to 1000mg BID and added Vimpat 200mg BID. Once EEG leads are off, we will repeat CT/CTA/CTP and make sure we are not missing vasospasm. RESP: Acute respiratory failure with hypoxia. Pt intubated with 7.5 OETT, 22 at teeth, intact with breath sound bilat. No distress. Chest CT done upon arrival: MERY, density LLL and small left pleural effusion.  Pt on PRVC @ 40%, P8, R15: AB.4/27/110. Patient not awake enough to wean the vent. CV: SBP goal <180.  cardene gtt prn. On metoprolol as well. 2D Echo: EF 40-45%, stress induced cardiomyopathy consistent with Takosubo. Trop peaked at 6.4, has trended back down, last one 1.4.+ murmur noted on exam. Will repeat echo next week.  HEME: HH 9.3/28.  .  SCDs/heparin 5000 units to bid.  
NEPH: bun/cr: 41/2.2 (hx of CKD). GI: TF today as tolerated. Pepcid. CK, triglycerides and LFTs are okay.   
ID: TM: 99.4  CT chest shows MERY pneumonia, will pan cx and start cefepime/vanc D7/10,.  Pan cx Neg thus far, UA with [id number] gram neg, will cont abx as pt with Chest CT MERY/LLL infiltrates. Cont monitor. LINES: Left IJ quad, left art line. Shmuel.  
  
  
 
 
Signed By: Aldo Antonio NP 2019

## 2019-02-26 NOTE — PROGRESS NOTES
Spoke with pt's sister, Joselyn Hernandez, on the phone. Updated about pt's status. In passing, Joselyn Hernandez stated she had made pt a DNR. Discussed in depth DNR status and that at this time pt is a full code. Joselyn Hernandez expressed enthusiastically that all family members were in agreement and pt is to be a DNR.  Will discuss with MD.

## 2019-02-26 NOTE — PROGRESS NOTES
PM&R Consult Progress Note Patient: Nesha Nicholson Admit Date: 2/20/2019 Admit Diagnosis: SAH (subarachnoid hemorrhage) ([facility] [address] [I60.9] Recommendations: Continue Acute Rehab Program, Coordination of rehab/medical care, Counseling of PM & R care issues management 
-remains neurologically unchanged from yesterday; moves spontaneously and reacts to pain in all 4 ext. R>L. Pt is not on sedation at this time. -prognosis guarded from a rehab standpoint. Was neurologically more responsive 72+ hrs ago; Ritalin and Amantadine started 2/25 per Dr Claudette Pat. If effective, should see some results in first 48-72hrs; ( may need to increase Amantadine dose to have benefit of its stimulant effects); tmax 100, on abx for UTI; doubt ID cause of decline. 
-What is HIV status? CD4 ct? 
-PT/OT/ST when able to participate History/Subjective/Complaint:  
 
Patient seen and examined. Records reviewed. unresponsive Pain 1 Pain Scale 1: Adult Nonverbal Pain Scale (02/26/19 0300) Pain Intensity 1: 0 (02/26/19 0300) Patient Stated Pain Goal: Unable to verbalize/indicate pain (02/26/19 0300) Pain Reassessment 1: Patient sleeping (02/21/19 1149) Pain Intervention(s) 1: Medication (see MAR) (02/22/19 8846) Objective:  
 
Vitals: 
Patient Vitals for the past 8 hrs: 
 BP Temp Pulse Resp SpO2  
02/26/19 0801 162/74  76 22 100 % 02/26/19 0738   78 20 100 % 02/26/19 0730 157/74  78 19 100 % 02/26/19 0701 167/77  80 21 100 % 02/26/19 0630 165/78  80 21 100 % 02/26/19 0615   77 21 100 % 02/26/19 0601 165/80  75 20 100 % 02/26/19 0545   72 20 100 % 02/26/19 0530 165/79  71 21 100 % 02/26/19 0515   74 21 100 % 02/26/19 0501 163/76  74 23 100 % 02/26/19 0445   74 22 100 % 02/26/19 0430 173/81  66 20 100 % 02/26/19 0415   65 22 100 % 02/26/19 0401 162/77  67 21 100 % 02/26/19 0347   75 24 100 % 02/26/19 0345   67 23 100 % 02/26/19 0330 143/67  76 25 100 % 02/26/19 0315   77 23 100 % 02/26/19 0301 156/76 99.4 °F (37.4 °C) 79 13 100 % 02/26/19 0245   73 21 100 % 02/26/19 0230 136/65  67 26 100 % 02/26/19 0215   70 20 100 % 02/26/19 0202 156/87  80 22 100 % 02/26/19 0145   74 20 100 % 02/26/19 0130 159/76  78 22 100 % Intake and Output: 
02/24 1901 - 02/26 0700 In: 5720.1 [I.V.:3103.1] Out: 3720 [TPZPT:1190] Allergies Allergen Reactions  Codeine Itching Ear itch, can't breath  Pcn [Penicillins] Rash and Itching Current Facility-Administered Medications Medication Dose Route Frequency  aspirin chewable tablet 81 mg  81 mg Per NG tube DAILY  levETIRAcetam (KEPPRA) 750 mg in 0.9% sodium chloride 100 mL IVPB  750 mg IntraVENous Q12H  
 amantadine HCl (SYMMETREL) capsule 100 mg  100 mg Per NG tube EVERY OTHER DAY  metoprolol tartrate (LOPRESSOR) tablet 25 mg  25 mg Per NG tube BID  methylphenidate HCl (RITALIN) tablet 5 mg  5 mg Per NG tube BID  ibuprofen (MOTRIN) tablet 600 mg  600 mg Per NG tube Q4H PRN  
 acetaminophen (TYLENOL) tablet 650 mg  650 mg Per NG tube Q4H PRN  niCARdipine (CARDENE) 25 mg in 0.9% sodium chloride 250 mL infusion  0-15 mg/hr IntraVENous TITRATE  vancomycin (VANCOCIN) 750 mg in  ml infusion  750 mg IntraVENous Q24H  
 0.9% sodium chloride infusion  50 mL/hr IntraVENous CONTINUOUS  
 heparin (porcine) injection 5,000 Units  5,000 Units SubCUTAneous Q12H  
 lansoprazole (PREVACID SOLUTAB) disintegrating tablet 30 mg  30 mg Per NG tube ACB  magnesium oxide (MAG-OX) tablet 400 mg  400 mg Per NG tube BID  senna-docusate (PERICOLACE) 8.6-50 mg per tablet 2 Tab  2 Tab Per NG tube QHS  venlafaxine (EFFEXOR) tablet 50 mg  50 mg Per NG tube TID WITH MEALS  abacavir (ZIAGEN) 20 mg/mL oral solution 300 mg  300 mg Per G Tube Q12H  
 heparin (PF) 2 units/ml in NS infusion 2,000 Units  1,000 mL Irrigation PRN  
  0.9% sodium chloride infusion 250 mL  250 mL IntraVENous PRN  
 0.9% sodium chloride infusion 250 mL  250 mL IntraVENous PRN  
 cefepime (MAXIPIME) 1 g in 0.9% sodium chloride (MBP/ADV) 50 mL ADV  1 g IntraVENous Q24H  
 insulin lispro (HUMALOG) injection   SubCUTAneous Q6H  
 polyvinyl alcohol (LIQUIFILM TEARS) 1.4 % ophthalmic solution 1 Drop  1 Drop Both Eyes PRN  niMODipine (NIMOTOP) 30 mg/mL oral solution (compound) 30 mg  30 mg Oral Q4H  
 NUTRITIONAL SUPPORT ELECTROLYTE PRN ORDERS   Does Not Apply PRN  
 darunavir ethanolate (PREZISTA) tablet 800 mg  800 mg Oral 7am  
 dolutegravir (TIVICAY) tablet 50 mg  50 mg Oral 7am  
 ritonavir (NORVIR) tablet 100 mg  100 mg Oral 7am  
 ondansetron (ZOFRAN) injection 4 mg  4 mg IntraVENous Q6H PRN  
 bisacodyl (DULCOLAX) tablet 5 mg  5 mg Oral DAILY PRN  
 fentaNYL citrate (PF) injection 50 mcg  50 mcg IntraVENous Q2H PRN Physical Exam: No significant changes Incision(s)/Wound(s): Wound Gluteal fold / cleft (Active) Number of days: 6704 Wound Arm Right;Upper (Active) Number of days: 1471 Wound Arm Right (Active) Number of days: 0657 Functional Assessment: 
Gross Assessment AROM: Grossly decreased, non-functional (02/23/19 1000) Strength: Grossly decreased, non-functional (02/23/19 1000) Coordination: Grossly decreased, non-functional (02/23/19 1000) Tone: Normal (02/23/19 1000) Sensation: Intact (02/23/19 1000) Bed Mobility Rolling: Total assistance (02/23/19 1000) Supine to Sit: Total assistance (02/23/19 1000) Sit to Supine: Total assistance (02/23/19 1000) Balance Sitting: Impaired;High guard (02/23/19 1000) Sitting - Static: Poor (constant support) (02/23/19 1000) Sitting - Dynamic: Poor (constant support) (02/23/19 1000) Bed/Mat Mobility Rolling: Total assistance (02/23/19 1000) Supine to Sit: Total assistance (02/23/19 1000) Sit to Supine: Total assistance (02/23/19 1000) Labs/Studies: 
Recent Results (from the past 72 hour(s)) SODIUM Collection Time: 02/23/19  9:29 AM  
Result Value Ref Range Sodium 152 (H) 136 - 145 mmol/L  
POTASSIUM Collection Time: 02/23/19  9:29 AM  
Result Value Ref Range Potassium 3.6 3.5 - 5.1 mmol/L  
GLUCOSE, POC Collection Time: 02/23/19 11:08 AM  
Result Value Ref Range Glucose (POC) 130 (H) 65 - 100 mg/dL CK Collection Time: 02/23/19 11:38 AM  
Result Value Ref Range  (H) 21 - 215 U/L  
TRIGLYCERIDE Collection Time: 02/23/19 11:38 AM  
Result Value Ref Range Triglyceride 343 (H) 35 - 150 MG/DL  
HEPATIC FUNCTION PANEL Collection Time: 02/23/19 11:38 AM  
Result Value Ref Range Protein, total 5.7 (L) 6.3 - 8.2 g/dL Albumin 2.7 (L) 3.5 - 5.0 g/dL Globulin 3.0 2.3 - 3.5 g/dL A-G Ratio 0.9 (L) 1.2 - 3.5 Bilirubin, total 0.5 0.2 - 1.1 MG/DL Bilirubin, direct 0.2 <0.4 MG/DL Alk. phosphatase 79 50 - 136 U/L  
 AST (SGOT) 67 (H) 15 - 37 U/L  
 ALT (SGPT) 56 12 - 65 U/L  
POC G3 Collection Time: 02/23/19  2:16 PM  
Result Value Ref Range Device: VENT    
 FIO2 (POC) 40 % pH (POC) 7.372 7.35 - 7.45    
 pCO2 (POC) 26.4 (L) 35 - 45 MMHG  
 pO2 (POC) 175 (H) 75 - 100 MMHG  
 HCO3 (POC) 15.4 (L) 22 - 26 MMOL/L  
 sO2 (POC) 100 (H) 95 - 98 % Base deficit (POC) 9 mmol/L Mode Pressure regulated volume control Tidal volume 450 ml Set Rate 15 bpm  
 PEEP/CPAP (POC) 8 cmH2O Allens test (POC) NOT APPLICABLE Site DRAWN FROM ARTERIAL LINE Patient temp. 98.6 Specimen type (POC) ARTERIAL Performed by Nicolas   
 CO2, POC 16 MMOL/L Respiratory comment: PhysicianNotified Exhaled minute volume 13.80 L/min COLLECT TIME 1,416 SODIUM Collection Time: 02/23/19  3:19 PM  
Result Value Ref Range Sodium 157 (H) 136 - 145 mmol/L  
GLUCOSE, POC  Collection Time: 02/23/19  5:34 PM  
 Result Value Ref Range Glucose (POC) 125 (H) 65 - 100 mg/dL SODIUM Collection Time: 02/23/19 10:29 PM  
Result Value Ref Range Sodium 158 (H) 136 - 145 mmol/L  
GLUCOSE, POC Collection Time: 02/23/19 11:18 PM  
Result Value Ref Range Glucose (POC) 164 (H) 65 - 100 mg/dL CBC W/O DIFF Collection Time: 02/24/19  3:13 AM  
Result Value Ref Range WBC 11.1 4.3 - 11.1 K/uL  
 RBC 2.87 (L) 4.05 - 5.2 M/uL HGB 9.1 (L) 11.7 - 15.4 g/dL HCT 27.7 (L) 35.8 - 46.3 % MCV 96.5 79.6 - 97.8 FL  
 MCH 31.7 26.1 - 32.9 PG  
 MCHC 32.9 31.4 - 35.0 g/dL  
 RDW 15.7 (H) 11.9 - 14.6 % PLATELET 750 (L) 177 - 450 K/uL MPV 9.9 9.4 - 12.3 FL ABSOLUTE NRBC 0.00 0.0 - 0.2 K/uL METABOLIC PANEL, BASIC Collection Time: 02/24/19  3:13 AM  
Result Value Ref Range Sodium 158 (H) 136 - 145 mmol/L Potassium 3.0 (L) 3.5 - 5.1 mmol/L Chloride 129 (H) 98 - 107 mmol/L  
 CO2 18 (L) 21 - 32 mmol/L Anion gap 11 7 - 16 mmol/L Glucose 192 (H) 65 - 100 mg/dL BUN 38 (H) 6 - 23 MG/DL Creatinine 2.25 (H) 0.6 - 1.0 MG/DL  
 GFR est AA 29 (L) >60 ml/min/1.73m2 GFR est non-AA 24 (L) >60 ml/min/1.73m2 Calcium 7.6 (L) 8.3 - 10.4 MG/DL MAGNESIUM Collection Time: 02/24/19  3:13 AM  
Result Value Ref Range Magnesium 2.5 (H) 1.8 - 2.4 mg/dL POC G3 Collection Time: 02/24/19  3:26 AM  
Result Value Ref Range Device: VENT    
 FIO2 (POC) 40 % pH (POC) 7.387 7.35 - 7.45    
 pCO2 (POC) 27.8 (L) 35 - 45 MMHG  
 pO2 (POC) 173 (H) 75 - 100 MMHG  
 HCO3 (POC) 16.7 (L) 22 - 26 MMOL/L  
 sO2 (POC) 100 (H) 95 - 98 % Base deficit (POC) 7 mmol/L Mode ASSIST CONTROL Tidal volume 450 ml Set Rate 15 bpm  
 PEEP/CPAP (POC) 8 cmH2O Allens test (POC) YES Site DRAWN FROM ARTERIAL LINE Patient temp. 98.6 Specimen type (POC) ARTERIAL Performed by Mark   
 CO2, POC 18 MMOL/L Exhaled minute volume 11.40 L/min COLLECT TIME 325 GLUCOSE, POC  
 Collection Time: 02/24/19  5:05 AM  
Result Value Ref Range Glucose (POC) 183 (H) 65 - 100 mg/dL SODIUM Collection Time: 02/24/19  9:24 AM  
Result Value Ref Range Sodium 155 (H) 136 - 145 mmol/L  
POTASSIUM Collection Time: 02/24/19  9:24 AM  
Result Value Ref Range Potassium 3.3 (L) 3.5 - 5.1 mmol/L  
GLUCOSE, POC Collection Time: 02/24/19 11:35 AM  
Result Value Ref Range Glucose (POC) 173 (H) 65 - 100 mg/dL SODIUM Collection Time: 02/24/19  3:55 PM  
Result Value Ref Range Sodium 156 (H) 136 - 145 mmol/L  
GLUCOSE, POC Collection Time: 02/24/19  6:14 PM  
Result Value Ref Range Glucose (POC) 190 (H) 65 - 100 mg/dL POTASSIUM Collection Time: 02/24/19  7:55 PM  
Result Value Ref Range Potassium 3.5 3.5 - 5.1 mmol/L  
SODIUM Collection Time: 02/24/19 10:14 PM  
Result Value Ref Range Sodium 157 (H) 136 - 145 mmol/L  
GLUCOSE, POC Collection Time: 02/24/19 11:22 PM  
Result Value Ref Range Glucose (POC) 227 (H) 65 - 100 mg/dL POC G3 Collection Time: 02/25/19  3:11 AM  
Result Value Ref Range Device: VENT    
 FIO2 (POC) 40 % pH (POC) 7.409 7.35 - 7.45    
 pCO2 (POC) 27.6 (L) 35 - 45 MMHG  
 pO2 (POC) 130 (H) 75 - 100 MMHG  
 HCO3 (POC) 17.5 (L) 22 - 26 MMOL/L  
 sO2 (POC) 99 (H) 95 - 98 % Base deficit (POC) 7 mmol/L Mode Pressure regulated volume control Tidal volume 450 ml Set Rate 12 bpm  
 PEEP/CPAP (POC) 8 cmH2O Allens test (POC) NOT APPLICABLE Inspiratory Time 0.9 sec Site DRAWN FROM ARTERIAL LINE Patient temp. 98.6 Specimen type (POC) ARTERIAL Performed by Yuki   
 CO2, POC 18 MMOL/L Respiratory comment: NurseNotified Exhaled minute volume 10.60 L/min COLLECT TIME 311 CBC W/O DIFF Collection Time: 02/25/19  3:28 AM  
Result Value Ref Range WBC 11.7 (H) 4.3 - 11.1 K/uL  
 RBC 2.89 (L) 4.05 - 5.2 M/uL HGB 9.1 (L) 11.7 - 15.4 g/dL HCT 27.8 (L) 35.8 - 46.3 % MCV 96.2 79.6 - 97.8 FL  
 MCH 31.5 26.1 - 32.9 PG  
 MCHC 32.7 31.4 - 35.0 g/dL  
 RDW 15.9 (H) 11.9 - 14.6 % PLATELET 836 (L) 006 - 450 K/uL MPV 9.6 9.4 - 12.3 FL ABSOLUTE NRBC 0.00 0.0 - 0.2 K/uL METABOLIC PANEL, BASIC Collection Time: 02/25/19  3:28 AM  
Result Value Ref Range Sodium 155 (H) 136 - 145 mmol/L Potassium 3.6 3.5 - 5.1 mmol/L Chloride 127 (H) 98 - 107 mmol/L  
 CO2 20 (L) 21 - 32 mmol/L Anion gap 8 7 - 16 mmol/L Glucose 227 (H) 65 - 100 mg/dL BUN 38 (H) 6 - 23 MG/DL Creatinine 2.26 (H) 0.6 - 1.0 MG/DL  
 GFR est AA 28 (L) >60 ml/min/1.73m2 GFR est non-AA 24 (L) >60 ml/min/1.73m2 Calcium 7.9 (L) 8.3 - 10.4 MG/DL MAGNESIUM Collection Time: 02/25/19  3:28 AM  
Result Value Ref Range Magnesium 2.4 1.8 - 2.4 mg/dL Janine Estrada Collection Time: 02/25/19  3:28 AM  
Result Value Ref Range Vancomycin,trough 18.6 5 - 20 ug/mL ASPIRIN TEST Collection Time: 02/25/19  3:28 AM  
Result Value Ref Range Aspirin test 350 (L) 620 - 672 ARU  
GLUCOSE, POC Collection Time: 02/25/19  5:02 AM  
Result Value Ref Range Glucose (POC) 205 (H) 65 - 100 mg/dL SODIUM Collection Time: 02/25/19  9:47 AM  
Result Value Ref Range Sodium 155 (H) 136 - 145 mmol/L  
GLUCOSE, POC Collection Time: 02/25/19 11:22 AM  
Result Value Ref Range Glucose (POC) 201 (H) 65 - 100 mg/dL SODIUM Collection Time: 02/25/19  3:41 PM  
Result Value Ref Range Sodium 157 (H) 136 - 145 mmol/L  
GLUCOSE, POC Collection Time: 02/25/19  5:21 PM  
Result Value Ref Range Glucose (POC) 161 (H) 65 - 100 mg/dL SODIUM Collection Time: 02/25/19  9:05 PM  
Result Value Ref Range Sodium 156 (H) 136 - 145 mmol/L  
GLUCOSE, POC Collection Time: 02/25/19 11:19 PM  
Result Value Ref Range Glucose (POC) 168 (H) 65 - 100 mg/dL CBC W/O DIFF Collection Time: 02/26/19  3:07 AM  
Result Value Ref Range WBC 11.8 (H) 4.3 - 11.1 K/uL RBC 2.95 (L) 4.05 - 5.2 M/uL HGB 9.3 (L) 11.7 - 15.4 g/dL HCT 28.8 (L) 35.8 - 46.3 % MCV 97.6 79.6 - 97.8 FL  
 MCH 31.5 26.1 - 32.9 PG  
 MCHC 32.3 31.4 - 35.0 g/dL  
 RDW 16.0 (H) 11.9 - 14.6 % PLATELET 710 (L) 501 - 450 K/uL MPV 9.4 9.4 - 12.3 FL ABSOLUTE NRBC 0.00 0.0 - 0.2 K/uL METABOLIC PANEL, BASIC Collection Time: 02/26/19  3:07 AM  
Result Value Ref Range Sodium 156 (H) 136 - 145 mmol/L Potassium 3.5 3.5 - 5.1 mmol/L Chloride 127 (H) 98 - 107 mmol/L  
 CO2 20 (L) 21 - 32 mmol/L Anion gap 9 7 - 16 mmol/L Glucose 183 (H) 65 - 100 mg/dL BUN 41 (H) 6 - 23 MG/DL Creatinine 2.20 (H) 0.6 - 1.0 MG/DL  
 GFR est AA 29 (L) >60 ml/min/1.73m2 GFR est non-AA 24 (L) >60 ml/min/1.73m2 Calcium 7.7 (L) 8.3 - 10.4 MG/DL MAGNESIUM Collection Time: 02/26/19  3:07 AM  
Result Value Ref Range Magnesium 2.3 1.8 - 2.4 mg/dL POC G3 Collection Time: 02/26/19  3:47 AM  
Result Value Ref Range Device: VENT    
 FIO2 (POC) 40 % pH (POC) 7.420 7.35 - 7.45    
 pCO2 (POC) 27.4 (L) 35 - 45 MMHG  
 pO2 (POC) 110 (H) 75 - 100 MMHG  
 HCO3 (POC) 17.8 (L) 22 - 26 MMOL/L  
 sO2 (POC) 98 95 - 98 % Base deficit (POC) 6 mmol/L Mode Pressure regulated volume control Tidal volume 450 ml Set Rate 12 bpm  
 PEEP/CPAP (POC) 8 cmH2O Allens test (POC) NOT APPLICABLE Inspiratory Time 0.9 sec Site DRAWN FROM ARTERIAL LINE Patient temp. 98.6 Specimen type (POC) ARTERIAL Performed by [facility]   
 CO2, POC 19 MMOL/L Exhaled minute volume 12.00 L/min COLLECT TIME 344 GLUCOSE, POC Collection Time: 02/26/19  5:35 AM  
Result Value Ref Range Glucose (POC) 180 (H) 65 - 100 mg/dL Assessment:  
 
Principal Problem: 
  Nontraumatic subarachnoid hemorrhage from right middle cerebral artery ([facility] [address] (2/20/2019) Active Problems: 
  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  
 
Recommendations: Continue Acute Rehab Program 
Coordination of rehab/medical care Counseling of PM & R care issues management Monitoring and management of medical conditions per plan of care/orders Discussion with Family/Caregiver/Staff Reviewed Therapies/Labs/Medications/Records Signed By:  Vanesa Gallardo MD   
 February 26, 2019

## 2019-02-26 NOTE — PROGRESS NOTES
Bedside and Verbal shift change report given to [address] [place] [address] (oncoming nurse) by Dixon Zambrano (offgoing nurse). Report included the following information SBAR, Kardex, ED Summary, OR Summary, Procedure Summary, Intake/Output, MAR and Recent Results. Dual neuro assessment performed. Pt does not open eyes to any stimulus, withdraws in all extremities and moves all extremities spontaneously.

## 2019-02-27 NOTE — PROGRESS NOTES
Ventilator check complete; patient has a #7.5 ET tube secured at the 24 at the lip. Patient is not sedated. Patient is not able to follow commands. Breath sounds are diminished. Trachea is midline, Negative for subcutaneous air, and chest excursion is symmetric. Patient is also Negative for cyanosis. All alarms are set and audible. Resuscitation bag is at the head of the bed. Ventilator Settings Mode FIO2 Rate Tidal Volume Pressure PEEP I:E Ratio PRVC  40 %   12 450 ml     8 cm H20  1:4.5 Peak airway pressure: 10 cm H2O Minute ventilation: 11.8 l/min ABG: No results for input(s): PH, PCO2, PO2, HCO3 in the last 72 hours.  
 
 
Bety , RT

## 2019-02-27 NOTE — PROGRESS NOTES
PM&R Consult Progress Note Patient: Porfirio Kahn Admit Date: 2/20/2019 Admit Diagnosis: SAH (subarachnoid hemorrhage) ([facility] [address] [I60.9] Recommendations: Continue Acute Rehab Program, Coordination of rehab/medical care, Counseling of PM & R care issues management SAH with IVH/R ICH from large right MCA aneurysm s/p coiling 2/22/2019.  
 
-no changes. Remains neurologically low level; cannot yet actively participate 
-twitching on thr right during eval; similar activity yesterday concerning for sz; cont higher dos Keppra and added Vimpat; EEG results not found 
-not following commands. Remains somnolent off of sedation; post ictal, vasospasm? 
-cont Cefepime and Vanc day 8/10 for PNA, castro cx neg History/Subjective/Complaint:  
 
Patient seen and examined. Records reviewed. intubated Pain 1 Pain Scale 1: Adult Nonverbal Pain Scale (02/27/19 0800) Pain Intensity 1: 0 (02/27/19 0400) Patient Stated Pain Goal: Unable to verbalize/indicate pain (02/27/19 0800) Pain Reassessment 1: Patient sleeping (02/21/19 1149) Pain Intervention(s) 1: Medication (see MAR) (02/22/19 0118) Objective:  
 
Vitals: 
Patient Vitals for the past 8 hrs: 
 BP Temp Pulse Resp SpO2  
02/27/19 1131 184/83  69 18 100 % 02/27/19 1130   68 22 100 % 02/27/19 1115   76 27 100 % 02/27/19 1100 165/78  65 19 100 % 02/27/19 1045   64 19 100 % 02/27/19 1032 157/77  61 17 100 % 02/27/19 1030   61 19 100 % 02/27/19 1015   61 22 100 % 02/27/19 1000 145/73 99.4 °F (37.4 °C) (!) 58 20 100 % 02/27/19 0945   (!) 58 19 100 % 02/27/19 0931 131/81  67 21 100 % 02/27/19 0930   67 14 100 % 02/27/19 0915   93 24 100 % 02/27/19 0900 156/74  91 22 100 % 02/27/19 0845   93 21 100 % 02/27/19 0831 156/75  94 19 100 % 02/27/19 0830   92 23 100 % 02/27/19 0815   93 25 100 % 02/27/19 0803   91 23 100 % 02/27/19 0800 164/72 99.6 °F (37.6 °C) 95 22 100 % 02/27/19 0745   89 22 100 % 02/27/19 0731 166/76  88 22 100 % 02/27/19 0730   95 22 100 % 02/27/19 0715   82 21 100 % 02/27/19 0700 150/73  82 21 100 % 02/27/19 0645   89 21 100 % 02/27/19 0631 147/71  82 21 100 % 02/27/19 0621 154/76  91 24 100 % 02/27/19 0600 160/72  85 20 100 % 02/27/19 0531 160/74  87 18 100 % 02/27/19 0500 165/77  86 20 100 % 02/27/19 0431 151/71  87 22 100 % 02/27/19 0400 162/77 100.1 °F (37.8 °C) 90 24 100 % Intake and Output: 
02/25 1901 - 02/27 0700 In: 6384.1 [I.V.:3708.1] Out: 5113 [NMTIT:8078] Allergies Allergen Reactions  Codeine Itching Ear itch, can't breath  Pcn [Penicillins] Rash and Itching Current Facility-Administered Medications Medication Dose Route Frequency  chlorhexidine (PERIDEX) 0.12 % mouthwash 15 mL  15 mL Oral Q12H  
 ritonavir (NORVIR) oral powder 100 mg  100 mg Per NG tube DAILY WITH BREAKFAST  niMODipine (NIMOTOP) 30 mg/mL oral solution (compound) 30 mg  30 mg Per NG tube Q4H  
 levETIRAcetam (KEPPRA) 1,000 mg in 0.9% sodium chloride 100 mL IVPB  1,000 mg IntraVENous Q12H  
 lacosamide (VIMPAT) 200 mg in 0.9% sodium chloride 100 mL IVPB  200 mg IntraVENous Q12H  
 metoprolol tartrate (LOPRESSOR) tablet 50 mg  50 mg Per NG tube BID  aspirin chewable tablet 81 mg  81 mg Per NG tube DAILY  ibuprofen (MOTRIN) tablet 600 mg  600 mg Per NG tube Q4H PRN  
 acetaminophen (TYLENOL) tablet 650 mg  650 mg Per NG tube Q4H PRN  niCARdipine (CARDENE) 25 mg in 0.9% sodium chloride 250 mL infusion  0-15 mg/hr IntraVENous TITRATE  
 0.9% sodium chloride infusion  50 mL/hr IntraVENous CONTINUOUS  
 heparin (porcine) injection 5,000 Units  5,000 Units SubCUTAneous Q12H  
 lansoprazole (PREVACID SOLUTAB) disintegrating tablet 30 mg  30 mg Per NG tube ACB  magnesium oxide (MAG-OX) tablet 400 mg  400 mg Per NG tube BID  senna-docusate (PERICOLACE) 8.6-50 mg per tablet 2 Tab  2 Tab Per NG tube QHS  venlafaxine (EFFEXOR) tablet 50 mg  50 mg Per NG tube TID WITH MEALS  abacavir (ZIAGEN) 20 mg/mL oral solution 300 mg  300 mg Per G Tube Q12H  
 heparin (PF) 2 units/ml in NS infusion 2,000 Units  1,000 mL Irrigation PRN  
 0.9% sodium chloride infusion 250 mL  250 mL IntraVENous PRN  
 0.9% sodium chloride infusion 250 mL  250 mL IntraVENous PRN  
 insulin lispro (HUMALOG) injection   SubCUTAneous Q6H  
 polyvinyl alcohol (LIQUIFILM TEARS) 1.4 % ophthalmic solution 1 Drop  1 Drop Both Eyes PRN  
 NUTRITIONAL SUPPORT ELECTROLYTE PRN ORDERS   Does Not Apply PRN  
 darunavir ethanolate (PREZISTA) tablet 800 mg  800 mg Oral 7am  
 dolutegravir (TIVICAY) tablet 50 mg  50 mg Oral 7am  
 ondansetron (ZOFRAN) injection 4 mg  4 mg IntraVENous Q6H PRN  
 bisacodyl (DULCOLAX) tablet 5 mg  5 mg Oral DAILY PRN  
 fentaNYL citrate (PF) injection 50 mcg  50 mcg IntraVENous Q2H PRN Physical Exam: No significant changes Incision(s)/Wound(s): Wound Gluteal fold / cleft (Active) Dressing Status  Clean, dry, and intact 2/27/2019  8:00 AM  
Dressing Type  Foam;Silicone 8/64/0287  9:66 AM  
Tissue Type Pink 2/27/2019  8:00 AM  
Drainage Amount  None 2/27/2019  8:00 AM  
Wound Odor None 2/27/2019  8:00 AM  
Periwound Skin Condition Intact 2/27/2019  8:00 AM  
Number of days: 3666 Wound Arm Right (Active) Number of days: 3795 [REMOVED] Wound Arm Right;Upper (Removed) Number of days: 2734 Functional Assessment: 
Gross Assessment AROM: Grossly decreased, non-functional (02/23/19 1000) Strength: Grossly decreased, non-functional (02/23/19 1000) Coordination: Grossly decreased, non-functional (02/23/19 1000) Tone: Normal (02/23/19 1000) Sensation: Intact (02/23/19 1000) Bed Mobility Rolling: Total assistance (02/23/19 1000) Supine to Sit: Total assistance (02/23/19 1000) Sit to Supine: Total assistance (02/23/19 1000) Balance Sitting: Impaired;High guard (02/23/19 1000) Sitting - Static: Poor (constant support) (02/23/19 1000) Sitting - Dynamic: Poor (constant support) (02/23/19 1000) Bed/Mat Mobility Rolling: Total assistance (02/23/19 1000) Supine to Sit: Total assistance (02/23/19 1000) Sit to Supine: Total assistance (02/23/19 1000) Labs/Studies: 
Recent Results (from the past 72 hour(s)) SODIUM Collection Time: 02/24/19  3:55 PM  
Result Value Ref Range Sodium 156 (H) 136 - 145 mmol/L  
GLUCOSE, POC Collection Time: 02/24/19  6:14 PM  
Result Value Ref Range Glucose (POC) 190 (H) 65 - 100 mg/dL POTASSIUM Collection Time: 02/24/19  7:55 PM  
Result Value Ref Range Potassium 3.5 3.5 - 5.1 mmol/L  
SODIUM Collection Time: 02/24/19 10:14 PM  
Result Value Ref Range Sodium 157 (H) 136 - 145 mmol/L  
GLUCOSE, POC Collection Time: 02/24/19 11:22 PM  
Result Value Ref Range Glucose (POC) 227 (H) 65 - 100 mg/dL POC G3 Collection Time: 02/25/19  3:11 AM  
Result Value Ref Range Device: VENT    
 FIO2 (POC) 40 % pH (POC) 7.409 7.35 - 7.45    
 pCO2 (POC) 27.6 (L) 35 - 45 MMHG  
 pO2 (POC) 130 (H) 75 - 100 MMHG  
 HCO3 (POC) 17.5 (L) 22 - 26 MMOL/L  
 sO2 (POC) 99 (H) 95 - 98 % Base deficit (POC) 7 mmol/L Mode Pressure regulated volume control Tidal volume 450 ml Set Rate 12 bpm  
 PEEP/CPAP (POC) 8 cmH2O Allens test (POC) NOT APPLICABLE Inspiratory Time 0.9 sec Site DRAWN FROM ARTERIAL LINE Patient temp. 98.6 Specimen type (POC) ARTERIAL Performed by Yuki   
 CO2, POC 18 MMOL/L Respiratory comment: NurseNotified Exhaled minute volume 10.60 L/min COLLECT TIME 311 CBC W/O DIFF Collection Time: 02/25/19  3:28 AM  
Result Value Ref Range WBC 11.7 (H) 4.3 - 11.1 K/uL  
 RBC 2.89 (L) 4.05 - 5.2 M/uL HGB 9.1 (L) 11.7 - 15.4 g/dL HCT 27.8 (L) 35.8 - 46.3 %  MCV 96.2 79.6 - 97.8 FL  
 MCH 31.5 26.1 - 32.9 PG  
 MCHC 32.7 31.4 - 35.0 g/dL  
 RDW 15.9 (H) 11.9 - 14.6 % PLATELET 461 (L) 896 - 450 K/uL MPV 9.6 9.4 - 12.3 FL ABSOLUTE NRBC 0.00 0.0 - 0.2 K/uL METABOLIC PANEL, BASIC Collection Time: 02/25/19  3:28 AM  
Result Value Ref Range Sodium 155 (H) 136 - 145 mmol/L Potassium 3.6 3.5 - 5.1 mmol/L Chloride 127 (H) 98 - 107 mmol/L  
 CO2 20 (L) 21 - 32 mmol/L Anion gap 8 7 - 16 mmol/L Glucose 227 (H) 65 - 100 mg/dL BUN 38 (H) 6 - 23 MG/DL Creatinine 2.26 (H) 0.6 - 1.0 MG/DL  
 GFR est AA 28 (L) >60 ml/min/1.73m2 GFR est non-AA 24 (L) >60 ml/min/1.73m2 Calcium 7.9 (L) 8.3 - 10.4 MG/DL MAGNESIUM Collection Time: 02/25/19  3:28 AM  
Result Value Ref Range Magnesium 2.4 1.8 - 2.4 mg/dL Reji Dross Collection Time: 02/25/19  3:28 AM  
Result Value Ref Range Vancomycin,trough 18.6 5 - 20 ug/mL ASPIRIN TEST Collection Time: 02/25/19  3:28 AM  
Result Value Ref Range Aspirin test 350 (L) 620 - 672 ARU  
GLUCOSE, POC Collection Time: 02/25/19  5:02 AM  
Result Value Ref Range Glucose (POC) 205 (H) 65 - 100 mg/dL SODIUM Collection Time: 02/25/19  9:47 AM  
Result Value Ref Range Sodium 155 (H) 136 - 145 mmol/L  
GLUCOSE, POC Collection Time: 02/25/19 11:22 AM  
Result Value Ref Range Glucose (POC) 201 (H) 65 - 100 mg/dL SODIUM Collection Time: 02/25/19  3:41 PM  
Result Value Ref Range Sodium 157 (H) 136 - 145 mmol/L  
GLUCOSE, POC Collection Time: 02/25/19  5:21 PM  
Result Value Ref Range Glucose (POC) 161 (H) 65 - 100 mg/dL SODIUM Collection Time: 02/25/19  9:05 PM  
Result Value Ref Range Sodium 156 (H) 136 - 145 mmol/L  
GLUCOSE, POC Collection Time: 02/25/19 11:19 PM  
Result Value Ref Range Glucose (POC) 168 (H) 65 - 100 mg/dL CBC W/O DIFF Collection Time: 02/26/19  3:07 AM  
Result Value Ref Range WBC 11.8 (H) 4.3 - 11.1 K/uL  
 RBC 2.95 (L) 4.05 - 5.2 M/uL HGB 9.3 (L) 11.7 - 15.4 g/dL HCT 28.8 (L) 35.8 - 46.3 % MCV 97.6 79.6 - 97.8 FL  
 MCH 31.5 26.1 - 32.9 PG  
 MCHC 32.3 31.4 - 35.0 g/dL  
 RDW 16.0 (H) 11.9 - 14.6 % PLATELET 993 (L) 787 - 450 K/uL MPV 9.4 9.4 - 12.3 FL ABSOLUTE NRBC 0.00 0.0 - 0.2 K/uL METABOLIC PANEL, BASIC Collection Time: 02/26/19  3:07 AM  
Result Value Ref Range Sodium 156 (H) 136 - 145 mmol/L Potassium 3.5 3.5 - 5.1 mmol/L Chloride 127 (H) 98 - 107 mmol/L  
 CO2 20 (L) 21 - 32 mmol/L Anion gap 9 7 - 16 mmol/L Glucose 183 (H) 65 - 100 mg/dL BUN 41 (H) 6 - 23 MG/DL Creatinine 2.20 (H) 0.6 - 1.0 MG/DL  
 GFR est AA 29 (L) >60 ml/min/1.73m2 GFR est non-AA 24 (L) >60 ml/min/1.73m2 Calcium 7.7 (L) 8.3 - 10.4 MG/DL MAGNESIUM Collection Time: 02/26/19  3:07 AM  
Result Value Ref Range Magnesium 2.3 1.8 - 2.4 mg/dL POC G3 Collection Time: 02/26/19  3:47 AM  
Result Value Ref Range Device: VENT    
 FIO2 (POC) 40 % pH (POC) 7.420 7.35 - 7.45    
 pCO2 (POC) 27.4 (L) 35 - 45 MMHG  
 pO2 (POC) 110 (H) 75 - 100 MMHG  
 HCO3 (POC) 17.8 (L) 22 - 26 MMOL/L  
 sO2 (POC) 98 95 - 98 % Base deficit (POC) 6 mmol/L Mode Pressure regulated volume control Tidal volume 450 ml Set Rate 12 bpm  
 PEEP/CPAP (POC) 8 cmH2O Allens test (POC) NOT APPLICABLE Inspiratory Time 0.9 sec Site DRAWN FROM ARTERIAL LINE Patient temp. 98.6 Specimen type (POC) ARTERIAL Performed by Neena   
 CO2, POC 19 MMOL/L Exhaled minute volume 12.00 L/min COLLECT TIME 344 GLUCOSE, POC Collection Time: 02/26/19  5:35 AM  
Result Value Ref Range Glucose (POC) 180 (H) 65 - 100 mg/dL SODIUM Collection Time: 02/26/19 10:19 AM  
Result Value Ref Range Sodium 153 (H) 136 - 145 mmol/L  
POTASSIUM Collection Time: 02/26/19 10:19 AM  
Result Value Ref Range Potassium 3.8 3.5 - 5.1 mmol/L  
GLUCOSE, POC  Collection Time: 02/26/19 11:40 AM  
 Result Value Ref Range Glucose (POC) 182 (H) 65 - 100 mg/dL LYMPHOCYTES, T-CELL SUBSETS PLUS CD3 Collection Time: 02/26/19  1:02 PM  
Result Value Ref Range Abs CD3 PENDING /uL Abs CD4 [place] PENDING /uL Abs CD 8 Suppressor PENDING /uL  
 % CD 3 Pos Lymph PENDING %  
 % CD 4 Pos Lymph PENDING %  
 % CD 8 Pos Lymph PENDING % CD4/CD8 Ratio PENDING   
 WBC 9.4 3.4 - 10.8 x10E3/uL Lymphocytes 11 Not Estab. % Abs Lymphocytes 1.1 0.7 - 3.1 x10E3/uL LYMPHOCYTES, CD4 PERCENT AND ABSOLUTE Collection Time: 02/26/19  1:03 PM  
Result Value Ref Range Abs CD4 [place] PENDING /uL  
 % CD 4 Pos Lymph PENDING % WBC 11.3 (H) 3.4 - 10.8 x10E3/uL  
 RBC 2.61 (L) 3.77 - 5.28 x10E6/uL HGB 8.3 (L) 11.1 - 15.9 g/dL HCT 24.6 (L) 34.0 - 46.6 % MCV 94 79 - 97 fL  
 MCH 31.8 26.6 - 33.0 pg  
 MCHC 33.7 31.5 - 35.7 g/dL  
 RDW 15.8 (H) 12.3 - 15.4 % PLATELET 701 (L) 570 - 379 x10E3/uL NEUTROPHILS 83 Not Estab. % Lymphocytes 11 Not Estab. % MONOCYTES 5 Not Estab. % EOSINOPHILS 0 Not Estab. % BASOPHILS 0 Not Estab. %  
 ABS. NEUTROPHILS 9.4 (H) 1.4 - 7.0 x10E3/uL Abs Lymphocytes 1.2 0.7 - 3.1 x10E3/uL  
 ABS. MONOCYTES 0.6 0.1 - 0.9 x10E3/uL  
 ABS. EOSINOPHILS 0.0 0.0 - 0.4 x10E3/uL  
 ABS. BASOPHILS 0.0 0.0 - 0.2 x10E3/uL IMMATURE GRANULOCYTES 1 Not Estab. %  
 ABS. IMM. GRANS. 0.1 0.0 - 0.1 X10E3/uL GLUCOSE, POC Collection Time: 02/26/19  5:31 PM  
Result Value Ref Range Glucose (POC) 181 (H) 65 - 100 mg/dL SODIUM Collection Time: 02/26/19  6:03 PM  
Result Value Ref Range Sodium 155 (H) 136 - 145 mmol/L  
SODIUM Collection Time: 02/26/19 11:37 PM  
Result Value Ref Range Sodium 155 (H) 136 - 145 mmol/L  
GLUCOSE, POC Collection Time: 02/26/19 11:37 PM  
Result Value Ref Range Glucose (POC) 167 (H) 65 - 100 mg/dL POC G3 Collection Time: 02/27/19  3:40 AM  
Result Value Ref Range Device: VENT    
 FIO2 (POC) 40 % pH (POC) 7.411 7.35 - 7.45    
 pCO2 (POC) 30.1 (L) 35 - 45 MMHG  
 pO2 (POC) 146 (H) 75 - 100 MMHG  
 HCO3 (POC) 19.2 (L) 22 - 26 MMOL/L  
 sO2 (POC) 99 (H) 95 - 98 % Base deficit (POC) 5 mmol/L Mode Pressure regulated volume control Tidal volume 450 ml Set Rate 12 bpm  
 PEEP/CPAP (POC) 8 cmH2O Allens test (POC) NOT APPLICABLE Inspiratory Time 0.9 sec Site DRAWN FROM ARTERIAL LINE Patient temp. 98.6 Specimen type (POC) ARTERIAL Performed by Rikki   
 CO2, POC 20 MMOL/L Respiratory comment: NurseNotified Exhaled minute volume 11.30 L/min COLLECT TIME 335 GLUCOSE, POC Collection Time: 02/27/19  5:23 AM  
Result Value Ref Range Glucose (POC) 162 (H) 65 - 100 mg/dL CBC W/O DIFF Collection Time: 02/27/19  8:12 AM  
Result Value Ref Range WBC 13.4 (H) 4.3 - 11.1 K/uL  
 RBC 2.90 (L) 4.05 - 5.2 M/uL HGB 9.1 (L) 11.7 - 15.4 g/dL HCT 28.8 (L) 35.8 - 46.3 % MCV 99.3 (H) 79.6 - 97.8 FL  
 MCH 31.4 26.1 - 32.9 PG  
 MCHC 31.6 31.4 - 35.0 g/dL  
 RDW 15.9 (H) 11.9 - 14.6 % PLATELET 221 788 - 452 K/uL MPV 9.8 9.4 - 12.3 FL ABSOLUTE NRBC 0.00 0.0 - 0.2 K/uL METABOLIC PANEL, BASIC Collection Time: 02/27/19  8:12 AM  
Result Value Ref Range Sodium 155 (H) 136 - 145 mmol/L Potassium 3.9 3.5 - 5.1 mmol/L Chloride 127 (H) 98 - 107 mmol/L  
 CO2 20 (L) 21 - 32 mmol/L Anion gap 8 7 - 16 mmol/L Glucose 170 (H) 65 - 100 mg/dL BUN 45 (H) 6 - 23 MG/DL Creatinine 2.13 (H) 0.6 - 1.0 MG/DL  
 GFR est AA 31 (L) >60 ml/min/1.73m2 GFR est non-AA 25 (L) >60 ml/min/1.73m2 Calcium 7.9 (L) 8.3 - 10.4 MG/DL MAGNESIUM Collection Time: 02/27/19  8:12 AM  
Result Value Ref Range Magnesium 2.2 1.8 - 2.4 mg/dL SODIUM Collection Time: 02/27/19  8:13 AM  
Result Value Ref Range Sodium 155 (H) 136 - 145 mmol/L Assessment:  
 
Principal Problem: Nontraumatic subarachnoid hemorrhage from right middle cerebral artery ([address] (2/20/2019) Active Problems: 
  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  
 
Recommendations: Continue Acute Rehab Program 
Coordination of rehab/medical care Counseling of PM & R care issues management Monitoring and management of medical conditions per plan of care/orders Discussion with Family/Caregiver/Staff Reviewed Therapies/Labs/Medications/Records Signed By:  Shan Vang MD   
 February 27, 2019

## 2019-02-27 NOTE — PROGRESS NOTES
Pt temp 99.9. Cooling blanket turned on at this time. Pt does not open eyes to any stimulus. Withdraws from all 4 extremities.

## 2019-02-27 NOTE — PROGRESS NOTES
Progress Note Patient: Jerral Snellen MRN: [id number]  SSN: [id number] YOB: 1960  Age: 60 y.o. Sex: female Admit Date: 2/20/2019 LOS: 7 days Subjective:  
Pt more awake this am on exam, she did follow commands on R, wd pain all extrems. Opened eyes. Following commands has been inconsistent today, but she is more awake today than yesterday. We stopped her abx yesterday to ensure no med component to her altered mental status. Could have been seizure related and pt post-ictal? Will cont monitor. Current Facility-Administered Medications Medication Dose Route Frequency  chlorhexidine (PERIDEX) 0.12 % mouthwash 15 mL  15 mL Oral Q12H  
 ritonavir (NORVIR) oral powder 100 mg  100 mg Per NG tube DAILY WITH BREAKFAST  niMODipine (NIMOTOP) 30 mg/mL oral solution (compound) 30 mg  30 mg Per NG tube Q4H  
 levETIRAcetam (KEPPRA) 1,000 mg in 0.9% sodium chloride 100 mL IVPB  1,000 mg IntraVENous Q12H  
 lacosamide (VIMPAT) 200 mg in 0.9% sodium chloride 100 mL IVPB  200 mg IntraVENous Q12H  
 metoprolol tartrate (LOPRESSOR) tablet 50 mg  50 mg Per NG tube BID  aspirin chewable tablet 81 mg  81 mg Per NG tube DAILY  ibuprofen (MOTRIN) tablet 600 mg  600 mg Per NG tube Q4H PRN  
 acetaminophen (TYLENOL) tablet 650 mg  650 mg Per NG tube Q4H PRN  niCARdipine (CARDENE) 25 mg in 0.9% sodium chloride 250 mL infusion  0-15 mg/hr IntraVENous TITRATE  
 0.9% sodium chloride infusion  50 mL/hr IntraVENous CONTINUOUS  
 heparin (porcine) injection 5,000 Units  5,000 Units SubCUTAneous Q12H  
 lansoprazole (PREVACID SOLUTAB) disintegrating tablet 30 mg  30 mg Per NG tube ACB  magnesium oxide (MAG-OX) tablet 400 mg  400 mg Per NG tube BID  senna-docusate (PERICOLACE) 8.6-50 mg per tablet 2 Tab  2 Tab Per NG tube QHS  venlafaxine (EFFEXOR) tablet 50 mg  50 mg Per NG tube TID WITH MEALS  abacavir (ZIAGEN) 20 mg/mL oral solution 300 mg  300 mg Per G Tube Q12H  heparin (PF) 2 units/ml in NS infusion 2,000 Units  1,000 mL Irrigation PRN  
 0.9% sodium chloride infusion 250 mL  250 mL IntraVENous PRN  
 0.9% sodium chloride infusion 250 mL  250 mL IntraVENous PRN  
 insulin lispro (HUMALOG) injection   SubCUTAneous Q6H  
 polyvinyl alcohol (LIQUIFILM TEARS) 1.4 % ophthalmic solution 1 Drop  1 Drop Both Eyes PRN  
 NUTRITIONAL SUPPORT ELECTROLYTE PRN ORDERS   Does Not Apply PRN  
 darunavir ethanolate (PREZISTA) tablet 800 mg  800 mg Oral 7am  
 dolutegravir (TIVICAY) tablet 50 mg  50 mg Oral 7am  
 ondansetron (ZOFRAN) injection 4 mg  4 mg IntraVENous Q6H PRN  
 bisacodyl (DULCOLAX) tablet 5 mg  5 mg Oral DAILY PRN  
 fentaNYL citrate (PF) injection 50 mcg  50 mcg IntraVENous Q2H PRN Objective:  
 
Vitals:  
 02/27/19 1215 02/27/19 1230 02/27/19 1231 02/27/19 1232 BP:   152/74 Pulse: 72 77 77 74 Resp: 23 21 22 21 Temp:      
SpO2: 100% 100% 100% 100% Weight:      
  
  
Intake and Output: 
Current Shift: 02/27 0701 - 02/27 1900 In: 1351.7 [I.V.:861.7] Out: 820 [Urine:820] Last 24 hr: 02/26 0701 - 02/27 0700 In: 4501.8 [I.V.:2651.8] Out: 1632 [Urine:2705] IO: +7830 TOTAL overall: +71083 Physical Exam:  
General:  Pt more awake today follows intermittent. Eyes:  R pupil +4/B, Left pupil +5/R. Mild nystagmus Neck: Supple, symmetrical, trachea midline, no adenopathy, thyroid: no enlargment/tenderness/nodules, no carotid bruit and no JVD. Lungs:   Clear to auscultation bilaterally. Remains on vent. Heart:  Regular rate and rhythm, S1, S2 normal, no murmur, click, rub or gallop. Abdomen:   Soft, non- Bowel sounds normal. No masses,  No organomegaly. Extremities: Moves R side to command. Wd pain on left. General edema bue. Pulses: 2+ and symmetric all extremities. Skin: Pt with bruising to BUE, no obvious skin breakdown. Neurologic: Pt was more awake this am, did follow intermittent on R. Nystagmus +. Weak on left. Lab/Data Review: 
 
Recent Results (from the past 12 hour(s)) POC G3 Collection Time: 02/27/19  3:40 AM  
Result Value Ref Range Device: VENT    
 FIO2 (POC) 40 % pH (POC) 7.411 7.35 - 7.45    
 pCO2 (POC) 30.1 (L) 35 - 45 MMHG  
 pO2 (POC) 146 (H) 75 - 100 MMHG  
 HCO3 (POC) 19.2 (L) 22 - 26 MMOL/L  
 sO2 (POC) 99 (H) 95 - 98 % Base deficit (POC) 5 mmol/L Mode Pressure regulated volume control Tidal volume 450 ml Set Rate 12 bpm  
 PEEP/CPAP (POC) 8 cmH2O Allens test (POC) NOT APPLICABLE Inspiratory Time 0.9 sec Site DRAWN FROM ARTERIAL LINE Patient temp. 98.6 Specimen type (POC) ARTERIAL Performed by Rikki   
 CO2, POC 20 MMOL/L Respiratory comment: NurseNotified Exhaled minute volume 11.30 L/min COLLECT TIME 335 GLUCOSE, POC Collection Time: 02/27/19  5:23 AM  
Result Value Ref Range Glucose (POC) 162 (H) 65 - 100 mg/dL CBC W/O DIFF Collection Time: 02/27/19  8:12 AM  
Result Value Ref Range WBC 13.4 (H) 4.3 - 11.1 K/uL  
 RBC 2.90 (L) 4.05 - 5.2 M/uL HGB 9.1 (L) 11.7 - 15.4 g/dL HCT 28.8 (L) 35.8 - 46.3 % MCV 99.3 (H) 79.6 - 97.8 FL  
 MCH 31.4 26.1 - 32.9 PG  
 MCHC 31.6 31.4 - 35.0 g/dL  
 RDW 15.9 (H) 11.9 - 14.6 % PLATELET 314 871 - 725 K/uL MPV 9.8 9.4 - 12.3 FL ABSOLUTE NRBC 0.00 0.0 - 0.2 K/uL METABOLIC PANEL, BASIC Collection Time: 02/27/19  8:12 AM  
Result Value Ref Range Sodium 155 (H) 136 - 145 mmol/L Potassium 3.9 3.5 - 5.1 mmol/L Chloride 127 (H) 98 - 107 mmol/L  
 CO2 20 (L) 21 - 32 mmol/L Anion gap 8 7 - 16 mmol/L Glucose 170 (H) 65 - 100 mg/dL BUN 45 (H) 6 - 23 MG/DL Creatinine 2.13 (H) 0.6 - 1.0 MG/DL  
 GFR est AA 31 (L) >60 ml/min/1.73m2 GFR est non-AA 25 (L) >60 ml/min/1.73m2 Calcium 7.9 (L) 8.3 - 10.4 MG/DL MAGNESIUM Collection Time: 02/27/19  8:12 AM  
Result Value Ref Range Magnesium 2.2 1.8 - 2.4 mg/dL SODIUM  
 Collection Time: 02/27/19  8:13 AM  
Result Value Ref Range Sodium 155 (H) 136 - 145 mmol/L  
GLUCOSE, POC Collection Time: 02/27/19 12:09 PM  
Result Value Ref Range Glucose (POC) 188 (H) 65 - 100 mg/dL Assessment/ Plan:  
 
Principal Problem: 
  Nontraumatic subarachnoid hemorrhage from right middle cerebral artery ([address] (2/20/2019) Active Problems: 
  Acute cystitis without hematuria (2/21/2019) Pneumonia of left lung due to infectious organism (2/21/2019) Nontraumatic subcortical hemorrhage of right cerebral hemisphere ([address] (2/21/2019) Communicating hydrocephalus (2/21/2019) Elevated troponin (2/21/2019) Cerebral edema ([address] (2/21/2019) Midline shift of brain (2/23/2019) Camilla coma scale score 3-8, at arrival to emergency department [facility]) (2/25/2019) Seizure after head injury ([address] (2/26/2019) NEURO:Pt has SAH with IVH/R ICH from large right MCA aneurysm s/p coiling 2/22/2019. Admitted to ICU. Q1 hour neuro checks. On SAH protocol - nimotop, Mg, lipitor. Hoang Mcdaniel 4, Macias Grade 4. CT head shows stable right frontal ICH with 7mm midline shift and IVH. CTA shows no filling of aneurysm and no obvious vasospasm.  CTP was normal. Pt drowsy still and not following commands. MRI brain showed only small HITS mainly in the right ICA territory and nothing that would explain her being drowsy or weak.  ASA response: 350, started on 81mg po daily.  EEG obtained with final read abnormal, enhanced cortical irritability. Increased Keppra to 1000mg BID and added Vimpat 200mg BID. CT this am showed improved midline shift. Na 155. Patient more awake this am and actually follows some commands - suspect that she was having seizures and that is why she was not responsive. RESP: Acute respiratory failure with hypoxia. Pt intubated with 7.5 OETT, 22 at teeth, intact with breath sound bilat. No distress.  Pt on PRVC @ 40%, P8, R15: AB.4//110. Patient not awake enough to wean the vent. CV: SBP goal <180.  cardene gtt prn. On metoprolol as well. 2D Echo: EF 40-45%, stress induced cardiomyopathy consistent with Takosubo. Trop peaked at 6.4, has trended back down, last one 1.4.+ murmur noted on exam. Will repeat echo next week.  HEME: HH 9.3/28.  .  SCDs/heparin 5000 units to bid.  
NEPH: bun/cr: 45/2.13 (hx of CKD). GI: TF today as tolerated. Pepcid. CK, triglycerides and LFTs are okay.   
ID: TM: 100.4. No abx at this time. Cont to monitor. LINES: Left IJ quad,  Mi.  
 
Pending CD4 and HIV/Hep C labs. Family made pt DNR yesterday, according to family wishes. Signed By: Fortino Miranda NP 2019

## 2019-02-27 NOTE — PROGRESS NOTES
Problem: Self Care Deficits Care Plan (Adult) Goal: *Acute Goals and Plan of Care (Insert Text) 1. Patient will feed self 50% meal with R UE, additional time, and adaptive utensils as needed. 2. Patient will complete upper body dressing and bathing with mod A and adaptive equipment as needed. 3. Patient will participate in BUE therapeutic exercises to increase strength in B UEs to at least 3+/5 for participation in ADLs and functional transfers. 4. Patient will participate in [address] [place] [address] therapeutic activities to increase coordination in B UEs to [facility] for bimanual fine motor ADLs. 5. Patient will attend to L side for 100% of treatment session with minimal verbal cues from therapist.  
6. Patient will complete functional transfers with min A, additional time, and adaptive equipment as needed. 7. Patient will complete simple grooming tasks after setup with mod A in supported sitting. Timeframe: 7 visits OCCUPATIONAL THERAPY: Initial Assessment and AM  
 2/27/2019INPATIENT:   
[place] / [place] / Product Type: Medicare /  
  
NAME/AGE/GENDER: Mesha Metz is a 60 y.o. female PRIMARY DIAGNOSIS:  SAH (subarachnoid hemorrhage) ([facility]) [I60.9] Nontraumatic subarachnoid hemorrhage from right middle cerebral artery ([address] Nontraumatic subarachnoid hemorrhage from right middle cerebral artery ([address] ICD-10: Treatment Diagnosis:  
 · Hemiplegia and hemiparesis following cerebral infarction affecting left non-dominant side (C76.432) Precautions/Allergies: 
  falls, Codeine and Pcn [penicillins] ASSESSMENT:  
Ms. Yousuf Peguero is 61 YO R dominant WF admitted with above, was having new onset seizures yesterday after procedure. Cleared today to see pt in ICU. Pt total A with all bed mobility. Bed placed into chair position to attempt to increase pt's alertness. No response from sternal rub. Not sedated. Minimal alertness, waxing and wanning.   OT held open pt's eyelids and pt seemed to have \"looked at\" OT 2x with a startle reaction. Not following any commands. No family present in room. B UEs are swollen. Art Line in L UE. B UEs are grossly nonfunctional at this point due to pt's decreased level of alertness. PROM is WFLs though edema is present in B hands/wrist and some in forearms/elbows. Not pulling away from painful stimuli. Head postured in down tay gaze to R side. Pt resistant to repositioning of head even with pillows. Worked on passive stretching of neck, edema management and retrograde massage to B hands, increasing alertness. Pt was able to hold onto the bed rails when hands were placed there. Per RN, pt lives with sister and was independent. Sister does drugs per RN. Pt is most likely functioning below her baseline and would benefit from skilled OT to maximize her independence, safety and activity tolerance for ADLs and mobility. Will most likely need post acute rehab in order to return home with sister. This section established at most recent assessment PROBLEM LIST (Impairments causing functional limitations): 1. Decreased Strength 2. Decreased ADL/Functional Activities 3. Decreased Transfer Abilities 4. Decreased Ambulation Ability/Technique 5. Decreased Balance 6. Decreased Activity Tolerance 7. Increased Shortness of Breath 8. Decreased Flexibility/Joint Mobility 9. Edema/Girth 10. Decreased Knowledge of Precautions 11. Decreased Skin Integrity/Hygeine 12. Decreased Cognition INTERVENTIONS PLANNED: (Benefits and precautions of occupational therapy have been discussed with the patient.) 1. Activities of daily living training 2. Adaptive equipment training 3. Balance training 4. Clothing management 5. Cognitive training 6. Community reintergration 7. Donning&doffing training 8. Group therapy 9. Neuromuscular re-eduation 10. Re-evaluation 11. Therapeutic activity 12. Therapeutic exercise TREATMENT PLAN: Frequency/Duration: Follow patient 3x per week to address above goals. Rehabilitation Potential For Stated Goals: Good RECOMMENDED REHABILITATION/EQUIPMENT: (at time of discharge pending progress): Due to the probability of continued deficits (see above) this patient will likely need continued skilled occupational therapy after discharge. Equipment: ? TBD based on progress OCCUPATIONAL PROFILE AND HISTORY:  
History of Present Injury/Illness (Reason for Referral): 
See H&P Past Medical History/Comorbidities: Ms. Khari Mariee  has a past medical history of Acute renal failure ([address] (7/30/2014), Anxiety state, unspecified, AR (allergic rhinitis), Arthritis, Autoimmune disease ([address] Candidiasis, Cervical dystonia, Chronic kidney disease (last dialysis 12/28/14), Chronic pain, CKD (chronic kidney disease), stage V ([address] Depression, E. coli septicemia ([address] (8/1/2014), Edema, Elevated LFTs (8/1/2014), Family history of polycystic kidney, GERD (gastroesophageal reflux disease), Herpes zoster, History of hepatitis C, HIV (human immunodeficiency virus infection) ([address] (7/30/2014), HIV disease ([address] (1991), Hypertension, Hyponatremia (7/30/2014), Hypotension, Infectious disease, Liver disease, Lumbago, Other acquired torsion dystonia, Pain in joint, ankle and foot, Polycystic kidney disease, Sepsis ([address] (7/30/2014), Shoulder pain, and UTI (lower urinary tract infection) (7/30/2014). Ms. Khari Mariee  has a past surgical history that includes hx vascular access; hx bunionectomy (Bilateral); vascular surgery procedure unlist (Right); vascular surgery procedure unlist (Right, 10/21/14); vascular surgery procedure unlist (Right, 1/2/15); and ir emboli intracran rt (2/22/2019). Social History/Living Environment:  
Home Environment: Private residence One/Two Story Residence: One story Living Alone: No(lives with sister) Support Systems: Family member(s) Patient Expects to be Discharged to[de-identified] Unknown Current DME Used/Available at Home: None Prior Level of Function/Work/Activity: 
Per RN, lives with sister in a 1 level home, was independent with ADLs and mobility with no DME Dominant Side:  
      RIGHT Number of Personal Factors/Comorbidities that affect the Plan of Care: Extensive review of physical, cognitive, and psychosocial performance (3+):  HIGH COMPLEXITY ASSESSMENT OF OCCUPATIONAL PERFORMANCE[de-identified]  
Activities of Daily Living:  
Basic ADLs (From Assessment) Complex ADLs (From Assessment) Feeding: Total assistance, Adaptive equipment Oral Facial Hygiene/Grooming: Total assistance Bathing: Total assistance Upper Body Dressing: Total assistance Lower Body Dressing: Total assistance Toileting: Total assistance, Adaptive equipment Instrumental ADL Meal Preparation: Total assistance Homemaking: Total assistance Medication Management: Total assistance Financial Management: Total assistance Grooming/Bathing/Dressing Activities of Daily Living Cognitive Retraining Safety/Judgement: Fall prevention Bed/Mat Mobility Rolling: Total assistance Supine to Sit: Total assistance Sit to Supine: Total assistance Scooting: Total assistance Most Recent Physical Functioning:  
Gross Assessment: 
AROM: Grossly decreased, non-functional(held on to bedrails when placed there) PROM: Generally decreased, functional 
Strength: Grossly decreased, non-functional 
Coordination: Grossly decreased, non-functional 
Tone: Normal 
Sensation: Impaired(did not pull away from painful stimuli) Posture: 
  
Balance: 
Sitting: Impaired Sitting - Static: Poor (constant support) Sitting - Dynamic: Poor (constant support) Bed Mobility: 
Rolling: Total assistance Supine to Sit: Total assistance Sit to Supine: Total assistance Scooting: Total assistance Wheelchair Mobility: 
  
Transfers: Patient Vitals for the past 6 hrs: 
 BP SpO2 Pulse 02/27/19 0645  100 % 89  
02/27/19 0700 150/73 100 % 82  
02/27/19 0715  100 % 82  
02/27/19 0730  100 % 95  
02/27/19 0731 166/76 100 % 88  
02/27/19 0745  100 % 89  
02/27/19 0800 164/72 100 % 95  
02/27/19 0803  100 % 91  
02/27/19 0815  100 % 93  
02/27/19 0830  100 % 92  
02/27/19 0831 156/75 100 % 94  
02/27/19 0845  100 % 93  
02/27/19 0900 156/74 100 % 91  
02/27/19 0915  100 % 93  
02/27/19 0930  100 % 67  
02/27/19 0931 131/81 100 % 67  
02/27/19 0945  100 % (!) 58  
02/27/19 1000 145/73 100 % (!) 58  
02/27/19 1015  100 % 61  
02/27/19 1030  100 % 61  
02/27/19 1032 157/77 100 % 61  
02/27/19 1045  100 % 64  
02/27/19 1100 165/78 100 % 65  
02/27/19 1115  100 % 76  
02/27/19 1130  100 % 68  
02/27/19 1131 184/83 100 % 69  
02/27/19 1145  100 % 71  
02/27/19 1200 148/72 100 % 67  
02/27/19 1215  100 % 72  
02/27/19 1230  100 % 77  
02/27/19 1231 152/74 100 % 77  
02/27/19 1232  100 % 74 Mental Status Neurologic State: Drowsy, Eyes do not open to any stimulus, Lethargic(maybe blinked 2x and \"saw\" OT when eyelids help open) Orientation Level: Unable to verbalize(intubated) Cognition: Decreased attention/concentration, Decreased command following, Impaired decision making Safety/Judgement: Fall prevention Physical Skills Involved: 1. Range of Motion 2. Balance 3. Strength 4. Activity Tolerance 5. Sensation 6. Fine Motor Control 7. Gross Motor Control 8. Pain (acute) 9. Edema 10. Skin Integrity Cognitive Skills Affected (resulting in the inability to perform in a timely and safe manner): 1. Perception 2. Executive Function 3. Immediate Memory 4. Short Term Recall 5. Sustained Attention 6. Divided Attention 7. Comprehension 8. Expression Psychosocial Skills Affected: 1. Habits/Routines 2. Environmental Adaptation 3. Social Interaction 4. Emotional Regulation 5. Self-Awareness 6. Awareness of Others 7. Social Roles Number of elements that affect the Plan of Care: 5+:  HIGH COMPLEXITY CLINICAL DECISION MAKIN[address] [place] [address] AM-PAC 6 Clicks Daily Activity Inpatient Short Form How much help from another person does the patient currently need. .. Total A Lot A Little None 1. Putting on and taking off regular lower body clothing? [x] 1   [] 2   [] 3   [] 4  
2. Bathing (including washing, rinsing, drying)? [x] 1   [] 2   [] 3   [] 4  
3. Toileting, which includes using toilet, bedpan or urinal?   [x] 1   [] 2   [] 3   [] 4  
4. Putting on and taking off regular upper body clothing? [x] 1   [] 2   [] 3   [] 4  
5. Taking care of personal grooming such as brushing teeth? [x] 1   [] 2   [] 3   [] 4  
6. Eating meals? [x] 1   [] 2   [] 3   [] 4  
© , Trustees of [address] [place] [address], under license to [employer]. All rights reserved Score:  Initial: 6, completed 2019 Most Recent: X (Date: -- ) Interpretation of Tool:  Represents activities that are increasingly more difficult (i.e. Bed mobility, Transfers, Gait). Medical Necessity:    
· Patient demonstrates good rehab potential due to higher previous functional level. Reason for Services/Other Comments: 
· Patient continues to require skilled intervention due to s/p above and decreased ADLs and mobility. Use of outcome tool(s) and clinical judgement create a POC that gives a: HIGH COMPLEXITY  
 
 
 
TREATMENT:  
(In addition to Assessment/Re-Assessment sessions the following treatments were rendered) Pre-treatment Symptoms/Complaints:   
Pain: Initial:  
  no pain noted Post Session:  unchanged Assessment/Reassessment only, no treatment provided today Braces/Orthotics/Lines/Etc:  
· IV 
· blanco catheter · drain Rectal 
· nasogastric tube · arterial line · ICU monitoring · O2 Device: Endotracheal tube, Ventilator Treatment/Session Assessment: · Response to Treatment:  Poorly responsive, not following commands · Interdisciplinary Collaboration:  
o Physical Therapist 
o Occupational Therapist 
o Registered Nurse · After treatment position/precautions:  
o Bed alarm/tab alert on 
o Bed/Chair-wheels locked 
o Bed in low position 
o Call light within reach 
o RN notified 
o Nurse at bedside 
o bed in chair position · Compliance with Program/Exercises: Will assess as treatment progresses. · Recommendations/Intent for next treatment session: \"Next visit will focus on advancements to more challenging activities and reduction in assistance provided\". Total Treatment Duration:  10 mins OT Patient Time In/Time Out Time In: 8404 Time Out: 1136 Jessica Wilkinson, MANDY Wilkinson, MS, OTR/L

## 2019-02-27 NOTE — PROGRESS NOTES
Speech therapy note Attempted to see pt this am, however, pt remains on vent.   
 
 Fredrick Huntley MA/CORWIN/SLP

## 2019-02-27 NOTE — PROGRESS NOTES
Bedside shift change report given to Sophy Rich, RN and Mague, RN (oncoming nurse) by Chelita Mao RN (offgoing nurse). Report included the following information SBAR, Kardex, ED Summary, OR Summary, Procedure Summary, Intake/Output, MAR, Recent Results, Med Rec Status, Cardiac Rhythm NSR and Alarm Parameters . Dual neuro assessment completed, patient waxes and wanes with neuro status, currently open slightly to command, PERRLA, 5 mm, equal and reactive, withdraws from all extremities, followed commands with right arm only, equal with all other extremities. Skin assessed, patient turned.

## 2019-02-27 NOTE — PROGRESS NOTES
Problem: Mobility Impaired (Adult and Pediatric) Goal: *Acute Goals and Plan of Care (Insert Text) STG: 
(1.)Ms. Amy Osborn will move from supine to sit and sit to supine , scoot up and down and roll side to side with MAXIMAL ASSIST within 4 treatment day(s). (2.)Ms. Amy Osborn will transfer from bed to chair and chair to bed with MAXIMAL ASSIST using the least restrictive device within 4 treatment day(s). LTG: 
(1.)Ms. Amy Osborn will move from supine to sit and sit to supine , scoot up and down and roll side to side in bed with MINIMAL ASSIST within 8 treatment day(s). (2.)Ms. Amy Osborn will transfer from bed to chair and chair to bed with MINIMAL ASSIST using the least restrictive device within 7 treatment day(s). (3.)Ms. Amy Osborn will ambulate with MODERATE ASSIST for 25+ feet with the least restrictive device within 8 treatment day(s). ________________________________________________________________________________________________ PHYSICAL THERAPY: Daily Note and AM 2/27/2019INPATIENT:   
[place] / [place] PART A [place] B / Product Type: Medicare /   
  
NAME/AGE/GENDER: Tessa Sagastume is a 60 y.o. female PRIMARY DIAGNOSIS: SAH (subarachnoid hemorrhage) ([facility]) [I60.9] Nontraumatic subarachnoid hemorrhage from right middle cerebral artery ([facility] Nontraumatic subarachnoid hemorrhage from right middle cerebral artery ([facility] ICD-10: Treatment Diagnosis:  
 · Generalized Muscle Weakness (M62.81) · Difficulty in walking, Not elsewhere classified (R26.2) Precaution/Allergies: 
Codeine and Pcn [penicillins] ASSESSMENT:  
Ms. Amy Osborn presents supine at arrival with multiple critical lines and intubated on vent, but not sedated. Patient not following commands or opening eyes. Bed placed in chair position. Neck rotated right and when placed in midline, patient would not maintain. Hands placed on siderails and patient did maintain them for several minutes.   When commanded to open eyes, observed eyebrow elevation briefly. Did not squeeze hands to command. No progress noted today. Pt would benefit from further PT while in house to address these impairments to help improve to prior level of independence. Discharge recommendations to be determined with progress-probably rehab. This section established at most recent assessment PROBLEM LIST (Impairments causing functional limitations): 1. Decreased Strength 2. Decreased ADL/Functional Activities 3. Decreased Transfer Abilities 4. Decreased Ambulation Ability/Technique 5. Decreased Balance 6. Decreased Activity Tolerance 7. Decreased Pacing Skills 8. Decreased Work Simplification/Energy Conservation Techniques 9. Increased Fatigue 10. Increased Shortness of Breath 11. Decreased Flexibility/Joint Mobility INTERVENTIONS PLANNED: (Benefits and precautions of physical therapy have been discussed with the patient.) 1. Balance Exercise 2. Bed Mobility 3. Gait Training 4. Home Exercise Program (HEP) 5. Neuromuscular Re-education/Strengthening 6. Range of Motion (ROM) 7. Therapeutic Activites 8. Therapeutic Exercise/Strengthening 9. Transfer Training TREATMENT PLAN: Frequency/Duration: 3 times a week for duration of hospital stay Rehabilitation Potential For Stated Goals: Poor RECOMMENDED REHABILITATION/EQUIPMENT: (at time of discharge pending progress): Due to the probability of continued deficits (see above) this patient will likely need continued skilled physical therapy after discharge. Equipment:  
? None at this time HISTORY:  
History of Present Injury/Illness (Reason for Referral): 
60 y.o. female who was found unresponsive at her home by family. Per report last known normal is unknown, he sister is at bedside but sedated and poor historian, states the pt has not been feeling well for several days.  Pt did not answer her door today and her neighbor and sister called EMS to respond, forced entry into home and pt found in bathroom floor unresponsive. Pt taken to [facility] ED for initial evaluation. Per notes pt was awake upon arrival with intermittent command following. Pt had a CT of her head and noted R SAH with ICH/IVH. Dr. Christopher Oconnor consulted to evaluate pt for underlying vascular abnormality. Pt was intubated prior to arrival by Dr. Josh Patrick as pt had decreased LOC. Pt arrived to [address] [place] [address] Dr. Natalie Wade  ED intubated and on propofol gtt via ground EMS. Pt wd to pain all extrems and followed commands intermittent. Pt taken to CT for STAT CTA of head and neck which showed pt with R ICH/IVH and R SAH, final read is pending, but noted L MCA aneurysm and what appears to be a large R MCA aneurysm. Per sister pt's mother  from ruptured cerebral aneurysm years ago. Pt has extensive medical history: HIV, Hep C, and CKD. Sister is unsure of her medication compliance, but states she knows pt takes her HIV meds daily, dx . Pt is smoker but sister denies any ETOH or elicit drugs. Pt does take multiple chronic pain meds per sister. There is a neighbor who is with pt's sister as sister is very upset and has taken sedating medications. Pt admitted to ICU under [facility] protocol. Per sister the pt knew that she had aneurysms in her brain, but no treatment in past. 
 
 
Past Medical History/Comorbidities: Ms. Cem Downey  has a past medical history of Acute renal failure ([address] (2014), Anxiety state, unspecified, AR (allergic rhinitis), Arthritis, Autoimmune disease ([address] Candidiasis, Cervical dystonia, Chronic kidney disease (last dialysis 14), Chronic pain, CKD (chronic kidney disease), stage V ([address] Depression, E. coli septicemia ([address] (2014), Edema, Elevated LFTs (2014), Family history of polycystic kidney, GERD (gastroesophageal reflux disease), Herpes zoster, History of hepatitis C, HIV (human immunodeficiency virus infection) ([address] (7/30/2014), HIV disease ([facility] (1991), Hypertension, Hyponatremia (7/30/2014), Hypotension, Infectious disease, Liver disease, Lumbago, Other acquired torsion dystonia, Pain in joint, ankle and foot, Polycystic kidney disease, Sepsis ([facility] (7/30/2014), Shoulder pain, and UTI (lower urinary tract infection) (7/30/2014). Ms. Blayne Peraza  has a past surgical history that includes hx vascular access; hx bunionectomy (Bilateral); vascular surgery procedure unlist (Right); vascular surgery procedure unlist (Right, 10/21/14); vascular surgery procedure unlist (Right, 1/2/15); and ir emboli intracran rt (2/22/2019). Social History/Living Environment:  
Home Environment: Private residence One/Two Story Residence: One story Living Alone: No 
Support Systems: Family member(s) Patient Expects to be Discharged to[de-identified] Rehabilitation facility Current DME Used/Available at Home: None Prior Level of Function/Work/Activity: Only info is that pt did drive. Number of Personal Factors/Comorbidities that affect the Plan of Care: 3+: HIGH COMPLEXITY EXAMINATION:  
Most Recent Physical Functioning:  
Gross Assessment: 
  
         
  
Posture: 
  
Balance: 
Sitting: Impaired Sitting - Static: Poor (constant support) Sitting - Dynamic: Poor (constant support) Bed Mobility: 
Rolling: Total assistance Supine to Sit: Total assistance Wheelchair Mobility: 
  
Transfers: 
  
Gait: 
  
   
  
Body Structures Involved: 1. Nerves 2. Eyes and Ears 3. Voice/Speech 4. Heart 5. Lungs 6. Metabolic 7. Endocrine 8. Bones 9. Joints 10. Muscles 11. Ligaments Body Functions Affected: 1. Mental 
2. Sensory/Pain 3. Voice and Speech 4. Cardio 5. Hematological 
6. Respiratory 7. Neuromusculoskeletal 
8. Movement Related 9. Metobolic/Endocrine Activities and Participation Affected: 1. General Tasks and Demands 2. Mobility 3. Self Care 4. Domestic Life 5. Community, Social and [place] Number of elements that affect the Plan of Care: 4+: HIGH COMPLEXITY CLINICAL PRESENTATION:  
Presentation: Evolving clinical presentation with unstable and unpredictable characteristics: HIGH COMPLEXITY CLINICAL DECISION MAKING:  
[facility] AM-PAC 6 Clicks Basic Mobility Inpatient Short Form How much difficulty does the patient currently have. .. Unable A Lot A Little None 1. Turning over in bed (including adjusting bedclothes, sheets and blankets)? [x] 1   [] 2   [] 3   [] 4  
2. Sitting down on and standing up from a chair with arms ( e.g., wheelchair, bedside commode, etc.)   [x] 1   [] 2   [] 3   [] 4  
3. Moving from lying on back to sitting on the side of the bed? [x] 1   [] 2   [] 3   [] 4 How much help from another person does the patient currently need. .. Total A Lot A Little None 4. Moving to and from a bed to a chair (including a wheelchair)? [x] 1   [] 2   [] 3   [] 4  
5. Need to walk in hospital room? [x] 1   [] 2   [] 3   [] 4  
6. Climbing 3-5 steps with a railing? [x] 1   [] 2   [] 3   [] 4  
© 2007, Trustees of [facility], under license to [employer]. All rights reserved Score:  Initial: 6 Most Recent: X (Date: -- ) Interpretation of Tool:  Represents activities that are increasingly more difficult (i.e. Bed mobility, Transfers, Gait). Medical Necessity:    
· Skilled intervention continues to be required due to decreased function. Reason for Services/Other Comments: 
· Patient continues to require skilled intervention due to medical complications and patient unable to attend/participate in therapy as expected. Use of outcome tool(s) and clinical judgement create a POC that gives a: Difficult prediction of patient's progress: HIGH COMPLEXITY  
  
 
 
 
TREATMENT:  
(In addition to Assessment/Re-Assessment sessions the following treatments were rendered) Pre-treatment Symptoms/Complaints:  none Pain: Initial: Pain Intensity 1: 0  Post Session:  0 Therapeutic Activity: (    10 minutes): Therapeutic activities including bed mobility, sitting balance activity, work on neck posture and control, and cueing to increase alertness and participation  to improve mobility, strength and balance. Required maximal   to promote static balance in sitting and promote motor control of bilateral, upper extremity(s), lower extremity(s). Braces/Orthotics/Lines/Etc:  
· IV 
· blanco catheter · nasogastric tube · art line · O2 Device: Endotracheal tube, Ventilator Treatment/Session Assessment:   
· Response to Treatment:  See above · Interdisciplinary Collaboration:  
o Physical Therapist 
o Occupational Therapist 
o Registered Nurse · After treatment position/precautions:  
o Call light within reach 
o RN notified 
o bed in modified chair position · Compliance with Program/Exercises: Will assess as treatment progresses · Recommendations/Intent for next treatment session: \"Next visit will focus on advancements to more challenging activities and reduction in assistance provided\". Total Treatment Duration: PT Patient Time In/Time Out Time In: 7648 Time Out: 1146 Chloe Fernandes PT, DPT

## 2019-02-27 NOTE — PROGRESS NOTES
Shift assessment complete, chart reviewed. Patient is orally intubated, vent settings per RT. Awakens to voice at times, intermittent with command following hour to hour. PERRLA 5 mm, equal and reactive. NSR/SB on monitor, BP stable on Cardene gtt, goal SBP less than 180. FiO2 40%, SpO2 100%. Breath sounds clear upper, diminished in bases. NGT in place, tolerating tube feeds at goal, minimal gastric residuals noted. Abdomen is semi-soft, edematous, bowel sounds active. Mi cath in place, draining clear yellow urine. Moves all extremities to painful stimuli equally, +2 pitting edema to RUE and BLE, +3 pitting edema to LUE. VSS. NAD.

## 2019-02-27 NOTE — PROGRESS NOTES
Ventilator check complete; patient has a #7.5 ET tube secured at the 24 at the lip. Patient is not sedated. Patient is able to follow commands. Breath sounds are coarse. Trachea is midline, Negative for subcutaneous air, and chest excursion is symmetrical. Patient is also Negative for cyanosis. All alarms are set and audible. Resuscitation bag is at the head of the bed. Ventilator Settings Mode FIO2 Rate Tidal Volume Pressure PEEP I:E Ratio PRVC  40 %  12  450 ml     8 cm H20  1:4.5 Peak airway pressure: 19 cm H2O Minute ventilation: 8.7 l/min ABG: No results for input(s): PH, PCO2, PO2, HCO3 in the last 72 hours. [facility]

## 2019-02-27 NOTE — PROGRESS NOTES
A follow up visit was made to the patient. Emotional support, spiritual presence and  
prayer were provided. Her sister, Ronald Rosario, and her best friend, Pratik Mcginnis were present. Rigo Burton

## 2019-02-27 NOTE — PROGRESS NOTES
Bedside, Verbal and Written shift change report given to Sanjay Hobbs RN (oncoming nurse) by Florentin Sharma RN (offgoing nurse). Report included the following information SBAR, Kardex, ED Summary, OR Summary, Procedure Summary, Intake/Output, MAR, Accordion, Recent Results, Cardiac Rhythm NSR, Alarm Parameters  and Quality Measures.

## 2019-02-27 NOTE — INTERDISCIPLINARY ROUNDS
Interdisciplinary team rounds were held 2/27/2019 with the following team members:Care Management, Nursing, Nurse Practitioner, Nutrition, Occupational Therapy, Palliative Care, Pastoral Care, Pharmacy, Physical Therapy, Physician, Respiratory Therapy and Clinical Coordinator. Plan of care discussed. See clinical pathway and/or care plan for interventions and desired outcomes.

## 2019-02-27 NOTE — PROGRESS NOTES
Assessment complete. Pt intubated. Eyes open to voice. Decreased command following. Pt withdraws from pain in all 4 extremities. Pt rectal temp 99.9. Tylenol given. Pupils reactive to light. L pupil size 5, R pupil size 4. Modified NIH stroke scale complete (see charting). VSS. Cardene infusing at 5mg/hr. Will continue to monitor closely.

## 2019-02-28 NOTE — PROGRESS NOTES
Ventilator check complete; patient has a #7.5 ET tube secured at the 24 at the lip. Patient is not sedated. Patient is able to follow commands. Breath sounds are coarse. Trachea is midline, Negative for subcutaneous air, and chest excursion is symmetric. Patient is also Negative for cyanosis. All alarms are set and audible. Resuscitation bag is at the head of the bed. Ventilator Settings Mode FIO2 Rate Tidal Volume Pressure PEEP I:E Ratio PRVC  50 %    450 ml     8 cm H20  1:4.5 Peak airway pressure: 19 cm H2O Minute ventilation: 14.5 l/min ABG: No results for input(s): PH, PCO2, PO2, HCO3 in the last 72 hours.  
 
 
Diana Epps, RT

## 2019-02-28 NOTE — PROGRESS NOTES
Interdisciplinary team rounds were held 2/28/2019 with the following team members:Care Management, Nursing, Nurse Practitioner, Nutrition, Palliative Care, Pastoral Care, Pharmacy, Physical Therapy, Physician, Respiratory Therapy and Clinical Coordinator and the patient. Plan of care discussed. See clinical pathway and/or care plan for interventions and desired outcomes.

## 2019-02-28 NOTE — PROGRESS NOTES
Ventilator check complete; patient has a #7.5 ET tube secured at the 24 at the lip. Patient is not sedated. Patient is not able to follow commands. Breath sounds are coarse. Trachea is midline, Negative for subcutaneous air, and chest excursion is symmetric. Patient is also Negative for cyanosis and is Positive for pitting edema. All alarms are set and audible. Resuscitation bag is at the head of the bed. Ventilator Settings Mode FIO2 Rate Tidal Volume Pressure PEEP I:E Ratio (P) PRVC  (P) 40 %   12 (P) 450 ml     (P) 8 cm H20  (P) 1:4.5 Peak airway pressure: (P) 29 cm H2O Minute ventilation: (P) 10.3 l/min ABG: No results for input(s): PH, PCO2, PO2, HCO3 in the last 72 hours.  
 
 
Maribell Flores, RT

## 2019-02-28 NOTE — PROGRESS NOTES
Problem: Nutrition Deficit Goal: *Optimize nutritional status Nutrition F/U: 
TF Management for Dr. Liu Murillo, NP. Assessment: 
Edema - anasarca, 3+ pitting all extremities. The patient remains intubated, ventilated and TF-dependent. TF formula was changed to Glucerna 1.2 on 2/25 with improvement in glucose control noted. Labs are remarkable for sodium 155 and AM glucose 158; POC glucose (2/27) [phone] and (2/28) [phone]. The patient has been receiving Pericolace since 2/22 and a Dulcolax suppository was placed on 2/25. Good GI tolerance is noted with low gastric residuals reported. Two bowel movements were recorded yesterday. Enteral Access: 
           NGT. Macronutrient Needs: 
Estimated calorie needs - 2502-3511 melanie/day (25-28 melanie/kg/day) Estimated protein needs - 44-66 gm pro/day (0.8-1.2 gm pro/kgIBW/day) (GFR 24 ml/min) Max CHO/day - 193 gm CHO/day (50% melanie/day)  
Fluid/day - 1.4-1.6 liters/day (1 ml/melanie/day) Intake/Comparative Standards: 
Current intake of TF (Glucerna 1.2 @ 50 ml/hr with a 20 ml/hr water flush) provides 1440 calories/day (100% calorie goal), 72 grams protein/day (100% protein goal), 138 grams CHO/day (does not exceed max CHO limit) and 1452 ml water/day (100% fluid goal). Intervention:  
Meals and Snacks: NPO. Enteral Nutrition: Continue current TF and hourly water flush. Mineral Supplement Therapy: Nutrition Support Orders/Electrolyte Management Replacement Protocols are active on the MAR. Vivien Jones Coordination of Nutrition Care by a Nutrition Professional: AM ICU rounds, collaboration with Angelito Begum [facility]. Nutrition Discharge Plan: Too soon to determine. Ligia Campa. Danielle [facility] 
[phone]

## 2019-02-28 NOTE — PROGRESS NOTES
At 9:15, neuro assessment performed and patient briefly opened eyes to voice but then closed eyes and will not reopen eyes to command. Patient is now not following commands but withdrawing to pain. L arm noted to be stiff and patient will not relax and open mouth for suction. The rigidity has resolved but patient is not opening eyes to command or following commands. MD and NP came to bedside to see patient and made aware of neuro changes.

## 2019-02-28 NOTE — PROGRESS NOTES
Pt held today d/t fever despite cooling blanket, < respiratory status w/ > vent settings, and decline in neuro status with possible seizures. Will follow up as schedule/ pt's status allows.  
 
Milady Chua OT

## 2019-02-28 NOTE — PROGRESS NOTES
Bedside shift change report given to Joceline Norris RN (oncoming nurse) by Anita Cantu RN (offgoing nurse). Report included the following information SBAR, Kardex, ED Summary, OR Summary, Intake/Output, MAR, Recent Results, Med Rec Status, Cardiac Rhythm NSR/SB and Alarm Parameters . Skin assessed. Patient turned. Dual neuro assessment completed, open eyes to frequent stimulus, PERRLA, 5 mm, equal and reactive, follows commands on right arm, some effort against gravity. Left arm withdraws only. Able to wiggle toes to BLE on command. Left weaker than right.

## 2019-02-28 NOTE — PROGRESS NOTES
Pt temp 100.1. Cooling blanket unsuccessful in reducing temp. Administered PRN motrin. Follow up 0630:  Pt temp down to 99.5

## 2019-02-28 NOTE — PROGRESS NOTES
Right arm appears to have focal seizure activity lasting approx 15 minutes. Afterwards, Pt arouses to voice and opens eyes, follows simple commands and tracking with eyes, Appears to smile/laugh appropriately, keenan spontaneously and on command. When oriented pt to place, appears to wrinkle forehead and frown, offered encouraging words. vss on monitor, cardene continues at 2.5mg/hr.

## 2019-02-28 NOTE — PROGRESS NOTES
Progress Note Patient: Joey Diaz MRN: [id number]  SSN: [id number] YOB: 1960  Age: 60 y.o. Sex: female Admit Date: 2/20/2019 LOS: 8 days Subjective:  
Nothing acute overnight. Current Facility-Administered Medications Medication Dose Route Frequency  [START ON 3/1/2019] dolutegravir (TIVICAY) tablet 50 mg  50 mg Per G Tube DAILY  [START ON 3/1/2019] darunavir ethanolate (PREZISTA) tablet 800 mg  800 mg Per G Tube 7am  
 chlorhexidine (PERIDEX) 0.12 % mouthwash 15 mL  15 mL Oral Q12H  
 ritonavir (NORVIR) oral powder 100 mg  100 mg Per NG tube DAILY WITH BREAKFAST  niMODipine (NIMOTOP) 30 mg/mL oral solution (compound) 30 mg  30 mg Per NG tube Q4H  
 levETIRAcetam (KEPPRA) 1,000 mg in 0.9% sodium chloride 100 mL IVPB  1,000 mg IntraVENous Q12H  
 lacosamide (VIMPAT) 200 mg in 0.9% sodium chloride 100 mL IVPB  200 mg IntraVENous Q12H  
 metoprolol tartrate (LOPRESSOR) tablet 50 mg  50 mg Per NG tube BID  aspirin chewable tablet 81 mg  81 mg Per NG tube DAILY  ibuprofen (MOTRIN) tablet 600 mg  600 mg Per NG tube Q4H PRN  
 acetaminophen (TYLENOL) tablet 650 mg  650 mg Per NG tube Q4H PRN  niCARdipine (CARDENE) 25 mg in 0.9% sodium chloride 250 mL infusion  0-15 mg/hr IntraVENous TITRATE  
 0.9% sodium chloride infusion  50 mL/hr IntraVENous CONTINUOUS  
 heparin (porcine) injection 5,000 Units  5,000 Units SubCUTAneous Q12H  
 lansoprazole (PREVACID SOLUTAB) disintegrating tablet 30 mg  30 mg Per NG tube ACB  magnesium oxide (MAG-OX) tablet 400 mg  400 mg Per NG tube BID  senna-docusate (PERICOLACE) 8.6-50 mg per tablet 2 Tab  2 Tab Per NG tube QHS  venlafaxine (EFFEXOR) tablet 50 mg  50 mg Per NG tube TID WITH MEALS  abacavir (ZIAGEN) 20 mg/mL oral solution 300 mg  300 mg Per G Tube Q12H  
 heparin (PF) 2 units/ml in NS infusion 2,000 Units  1,000 mL Irrigation PRN  
 0.9% sodium chloride infusion 250 mL  250 mL IntraVENous PRN  
  0.9% sodium chloride infusion 250 mL  250 mL IntraVENous PRN  
 insulin lispro (HUMALOG) injection   SubCUTAneous Q6H  
 polyvinyl alcohol (LIQUIFILM TEARS) 1.4 % ophthalmic solution 1 Drop  1 Drop Both Eyes PRN  
 NUTRITIONAL SUPPORT ELECTROLYTE PRN ORDERS   Does Not Apply PRN  
 ondansetron (ZOFRAN) injection 4 mg  4 mg IntraVENous Q6H PRN  
 bisacodyl (DULCOLAX) tablet 5 mg  5 mg Oral DAILY PRN  
 fentaNYL citrate (PF) injection 50 mcg  50 mcg IntraVENous Q2H PRN Objective:  
 
Vitals:  
 02/28/19 1101 02/28/19 1123 02/28/19 1137 02/28/19 1212 BP: 173/82  181/84 195/83 Pulse: (!) 58 62 65 65 Resp: 19 19 20 Temp:      
SpO2: 100% 100% 100% Weight:      
  
  
Intake and Output: 
Current Shift: 02/28 0701 - 02/28 1900 In: 36 Out: 385 [Urine:385] Last 24 hr: 02/27 0701 - 02/28 0700 In: 3790.3 [I.V.:2009.3] Out: 2610 [Urine:2610] Physical Exam:  
General:   no distress, appears stated age. Eyes:  Conjunctivae/corneas clear. PERRL, EOMs intact. Neck: Supple, symmetrical, trachea midline, no adenopathy, thyroid: no enlargment/tenderness/nodules, no carotid bruit and no JVD. Lungs:   Coarse bilaterally. Heart:  Has systolic murmer Abdomen:   Soft, non-tender. Bowel sounds normal. No masses,  No organomegaly. Extremities: Generalized edema Pulses: 2+ and symmetric all extremities. Skin: Skin color, texture, turgor normal. Skin ecchymosis noted on arms bilaterally unchanged Neurologic:  Intubated. Would not open eyes today. Withdraws right>left. Would not follow commands. Lab/Data Review: 
 
Recent Results (from the past 12 hour(s)) GLUCOSE, POC Collection Time: 02/28/19 12:19 AM  
Result Value Ref Range Glucose (POC) 165 (H) 65 - 100 mg/dL MAGNESIUM Collection Time: 02/28/19  3:29 AM  
Result Value Ref Range Magnesium 2.3 1.8 - 2.4 mg/dL CBC W/O DIFF Collection Time: 02/28/19  3:29 AM  
Result Value Ref Range WBC 16.5 (H) 4.3 - 11.1 K/uL  
 RBC 2.79 (L) 4.05 - 5.2 M/uL HGB 8.8 (L) 11.7 - 15.4 g/dL HCT 27.6 (L) 35.8 - 46.3 % MCV 98.9 (H) 79.6 - 97.8 FL  
 MCH 31.5 26.1 - 32.9 PG  
 MCHC 31.9 31.4 - 35.0 g/dL  
 RDW 15.7 (H) 11.9 - 14.6 % PLATELET 729 046 - 376 K/uL MPV 10.0 9.4 - 12.3 FL ABSOLUTE NRBC 0.00 0.0 - 0.2 K/uL METABOLIC PANEL, BASIC Collection Time: 02/28/19  3:29 AM  
Result Value Ref Range Sodium 155 (H) 136 - 145 mmol/L Potassium 3.9 3.5 - 5.1 mmol/L Chloride 127 (H) 98 - 107 mmol/L  
 CO2 21 21 - 32 mmol/L Anion gap 7 7 - 16 mmol/L Glucose 158 (H) 65 - 100 mg/dL BUN 50 (H) 6 - 23 MG/DL Creatinine 2.12 (H) 0.6 - 1.0 MG/DL  
 GFR est AA 31 (L) >60 ml/min/1.73m2 GFR est non-AA 25 (L) >60 ml/min/1.73m2 Calcium 8.1 (L) 8.3 - 10.4 MG/DL  
POC G3 Collection Time: 02/28/19  3:32 AM  
Result Value Ref Range Device: VENT    
 FIO2 (POC) 40 % pH (POC) 7.436 7.35 - 7.45    
 pCO2 (POC) 26.9 (L) 35 - 45 MMHG  
 pO2 (POC) 85 75 - 100 MMHG  
 HCO3 (POC) 18.1 (L) 22 - 26 MMOL/L  
 sO2 (POC) 97 95 - 98 % Base deficit (POC) 6 mmol/L Mode Pressure regulated volume control Tidal volume 450 ml Set Rate 12 bpm  
 PEEP/CPAP (POC) 8 cmH2O Allens test (POC) NOT APPLICABLE Inspiratory Time 0.9 sec Site DRAWN FROM ARTERIAL LINE Patient temp. 98.6 Specimen type (POC) ARTERIAL Performed by Yuki   
 CO2, POC 19 MMOL/L Respiratory comment: NurseNotified Exhaled minute volume 13.10 L/min COLLECT TIME 327 GLUCOSE, POC Collection Time: 02/28/19  5:50 AM  
Result Value Ref Range Glucose (POC) 159 (H) 65 - 100 mg/dL Assessment/ Plan:  
 
Principal Problem: 
  Nontraumatic subarachnoid hemorrhage from right middle cerebral artery ([address] (2/20/2019) Active Problems: 
  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 Admitted to ICU. Q1 hour neuro checks. On SAH protocol - nimotop, Mg, lipitor. Hoang Mcdaniel 4, Macias Grade 4. CT head shows stable right frontal ICH with 7mm midline shift and IVH. CTA shows no filling of aneurysm and no obvious vasospasm.  CTP was normal.  MRI brain showed only small HITS mainly in the right ICA territory and nothing that would explain her being drowsy or weak.  ASA response: 350, started on 81mg po daily.  EEG obtained with final read abnormal, enhanced cortical irritability. Increased Keppra to 1000mg BID and added Vimpat 200mg BID. Last CT showed improved midline shift. Na 155. Patient waxing and waning. Exam not great today. Will repeat CT head in am. I spoke with the family about either trach/PEG or taking her off the ventilator and making her comfort care. Patient is DNR. RESP: Acute respiratory failure with hypoxia. Pt intubated with 7.5 OETT, 22 at teeth, intact with breath sound bilat. No distress. Pt on PRVC @ 40%, P8, R15: AB.43//85. FIO2s increased to 60%. Patient not awake enough to wean the vent. CV: SBP goal <180.  cardene gtt prn. On metoprolol as well. 2D Echo: EF 40-45%, stress induced cardiomyopathy consistent with Takosubo. Trop peaked at 6.4, has trended back down, last one 1.4.+ murmur noted on exam. Will repeat echo next week.  HEME: HH 8.8/27. 6.  .  SCDs/heparin 5000 units to bid.  
NEPH: bun/cr: 50/2.1 (hx of CKD). GI: TF today as tolerated. Pepcid. CK, triglycerides and LFTs are okay.    
 ID: TM: 100.4. No abx at this time. Cont to monitor. LINES: Left IJ quad,  Mi.  
HIV: On HIV meds. CD4 count was low. 
  
 
 
 
 
Signed By: Gail Talley MD   
 February 28, 2019

## 2019-02-28 NOTE — PROGRESS NOTES
Bedside shift change report given to Hansel Werner (oncoming nurse) by Candice White (offgoing nurse). Report included the following information SBAR, Kardex, Procedure Summary, Intake/Output, MAR, Recent Results and Cardiac Rhythm SR/SB. Dual Neuro assessment performed, Dual skin assessment performed. Pt turned. Pt able to follow some commands. Moving toes bilaterally, fingers on right hand only. No resistance against gravity on LUE or BLE.

## 2019-02-28 NOTE — PROGRESS NOTES
Bedside report from [facility] with dual neuro assessment. Pt raises eyebrows when name called and mouths \"what\" but does not open eyes Pupils equal 5, sluggish, conjugate No command following Minimal response to stimuli to LUE, RUE seems to draw inward with stimulation. BLE responds to sensation, no babinski noted. Incontinence care given with linen change and repositioning. sbp greater than 180, restarted cardene at 5 per mar. Will continue care and assessment.

## 2019-03-01 NOTE — PROGRESS NOTES
Ventilator check complete; patient has a #7.5 ET tube secured at the 24 at the lip. Patient is able to follow commands. Breath sounds are coarse. Trachea is midline, Negative for subcutaneous air, and chest excursion is symmetric. Patient is also Negative for cyanosis. All alarms are set and audible. Resuscitation bag is at the head of the bed. Ventilator Settings Mode FIO2 Rate Tidal Volume Pressure PEEP I:E Ratio PRVC  40 %  12  450 ml     8 cm H20  1:4.5 Peak airway pressure: 21 cm H2O Minute ventilation: 11.6 l/min Ruben Soler, RRT

## 2019-03-01 NOTE — PROGRESS NOTES
Bedside report given to Balwinder Obrien, RN with dual neuro 
cardene continues at 2.5mg/hr Art bp 164/72, hr 63

## 2019-03-01 NOTE — PROGRESS NOTES
Pt noted to have decreased LOC during midnight neuro assessment. Eyes noted to have nystagmus and rhytmic jerking movement of R arm noted. Katherin  NP notified. New orders received for 2 mg ativan and for CT of the head.

## 2019-03-01 NOTE — PROGRESS NOTES
SPEECH PATHOLOGY NOTE: 
 
Patient remains on ventilator support. Will continue to follow with plans to initiate dysphagia assessment as medical status improves and she is able to participate.   
 
Mitchel Disla, [facility] MARGARETTE REN, CORWIN-SLP

## 2019-03-01 NOTE — PROGRESS NOTES
Physical Therapy Note: 
 
Patient not appropriate for physical therapy treatment this AM. Will hold and attempt another date as patient is able and schedule allows. Thank you, DEVENDRA SchaefferT

## 2019-03-01 NOTE — PROGRESS NOTES
Ventilator check complete; patient has a #7.5 ET tube secured at the 24 at the lip. Patient is not sedated. Patient is able to follow commands. Breath sounds are coarse. Trachea is midline, Negative for subcutaneous air, and chest excursion is symmetric. Patient is also Negative for cyanosis. All alarms are set and audible. Resuscitation bag is at the head of the bed. Ventilator Settings Mode FIO2 Rate Tidal Volume Pressure PEEP I:E Ratio (P) PRVC  (P) 50 %   12 (P) 450 ml     (P) 8 cm H20  (P) 1:4.5 Peak airway pressure: (P) 21 cm H2O Minute ventilation: (P) 9.4 l/min ABG: No results for input(s): PH, PCO2, PO2, HCO3 in the last 72 hours.  
 
 
Wreath Whitfield Cogan, RT

## 2019-03-01 NOTE — PROGRESS NOTES
Chart reviewed and pt discussed with [address] [place] [address] BRANDON Ramos and Dr. Lord Santos. Family has decided DNR and comfort care once family here from [place] to see pt. CM will continue to follow and available for any needs.

## 2019-03-01 NOTE — PROGRESS NOTES
PM&R Consult Progress Note Patient: Beto Huntley Admit Date: 2/20/2019 Admit Diagnosis: SAH (subarachnoid hemorrhage) ([facility] [address] [I60.9] Recommendations: Continue Acute Rehab Program, Coordination of rehab/medical care, Counseling of PM & R care issues management, Hospital Inpatient Rehab 
[facility] with IVH/R ICH from large right MCA aneurysm s/p coiling 2/22/2019.  
-remains intubated, intermittent focal szs, fever. Follows commands on occasion. -CT head this a.m; 3.5 cm right frontal lobe intraparenchymal 
hemorrhage is unchanged, as is bilateral subarachnoid and intraventricular 
hemorrhage. 8 mm of leftward midline shift is also unchanged. -? Trach/peg vs comfort care. If survives, will require 24hr physical care at [facility]. Family needs to consider this. Her quality of life needs to be considered as well. History/Subjective/Complaint:  
 
Patient seen and examined. Records reviewed. somnolent Pain 1 Pain Scale 1: Adult Nonverbal Pain Scale (03/01/19 0400) Pain Intensity 1: 0 (03/01/19 0400) Patient Stated Pain Goal: Unable to verbalize/indicate pain (03/01/19 0400) Pain Reassessment 1: Patient sleeping (03/01/19 0400) Pain Intervention(s) 1: Medication (see MAR) (02/22/19 4189) Objective:  
 
Vitals: 
Patient Vitals for the past 8 hrs: 
 BP Temp Pulse Resp SpO2  
03/01/19 0630 150/72  81 20 100 % 03/01/19 0615   84 22 100 % 03/01/19 0600 148/74  91 21 100 % 03/01/19 0545   90 21 100 % 03/01/19 0530   89 19 100 % 03/01/19 0515   84 22 100 % 03/01/19 0500 155/73  83 22 100 % 03/01/19 0445   84 22 100 % 03/01/19 0430   82 23 100 % 03/01/19 0415   83 23 100 % 03/01/19 0400 131/63 100.3 °F (37.9 °C) 79 25 100 % 03/01/19 0345   88 25 100 % 03/01/19 0344     100 % 03/01/19 0330   89 23 100 % 03/01/19 0315   80 23 100 % 03/01/19 0300 151/74  80 20 100 % 03/01/19 0245   79 23 100 % 03/01/19 0230   80 22 100 % 03/01/19 0215   76 23 100 % 03/01/19 0200 139/67  76 23 100 % 03/01/19 0145   78 23 100 % 03/01/19 0133 142/69  79 23 100 % 03/01/19 0130   80 22 100 % 03/01/19 0115   87 22 100 % 03/01/19 0114 156/73  89 23 100 % 03/01/19 0100   91 29 100 % 03/01/19 0045   91 26 100 % 03/01/19 0015  100.3 °F (37.9 °C) 69 25 100 % Intake and Output: 
02/27 1901 - 03/01 0700 In: 4712.9 [I.V.:2406.9] Out: 5285 [[facility]:[id number]] Allergies Allergen Reactions  Codeine Itching Ear itch, can't breath  Pcn [Penicillins] Rash and Itching Current Facility-Administered Medications Medication Dose Route Frequency  dolutegravir (TIVICAY) tablet 50 mg  50 mg Per G Tube DAILY  darunavir ethanolate (PREZISTA) tablet 800 mg  800 mg Per G Tube 7am  
 chlorhexidine (PERIDEX) 0.12 % mouthwash 15 mL  15 mL Oral Q12H  
 ritonavir (NORVIR) oral powder 100 mg  100 mg Per NG tube DAILY WITH BREAKFAST  niMODipine (NIMOTOP) 30 mg/mL oral solution (compound) 30 mg  30 mg Per NG tube Q4H  
 levETIRAcetam (KEPPRA) 1,000 mg in 0.9% sodium chloride 100 mL IVPB  1,000 mg IntraVENous Q12H  
 lacosamide (VIMPAT) 200 mg in 0.9% sodium chloride 100 mL IVPB  200 mg IntraVENous Q12H  
 metoprolol tartrate (LOPRESSOR) tablet 50 mg  50 mg Per NG tube BID  aspirin chewable tablet 81 mg  81 mg Per NG tube DAILY  ibuprofen (MOTRIN) tablet 600 mg  600 mg Per NG tube Q4H PRN  
 acetaminophen (TYLENOL) tablet 650 mg  650 mg Per NG tube Q4H PRN  niCARdipine (CARDENE) 25 mg in 0.9% sodium chloride 250 mL infusion  0-15 mg/hr IntraVENous TITRATE  
 0.9% sodium chloride infusion  50 mL/hr IntraVENous CONTINUOUS  
 heparin (porcine) injection 5,000 Units  5,000 Units SubCUTAneous Q12H  
 lansoprazole (PREVACID SOLUTAB) disintegrating tablet 30 mg  30 mg Per NG tube ACB  magnesium oxide (MAG-OX) tablet 400 mg  400 mg Per NG tube BID  
  senna-docusate (PERICOLACE) 8.6-50 mg per tablet 2 Tab  2 Tab Per NG tube QHS  venlafaxine (EFFEXOR) tablet 50 mg  50 mg Per NG tube TID WITH MEALS  abacavir (ZIAGEN) 20 mg/mL oral solution 300 mg  300 mg Per G Tube Q12H  
 heparin (PF) 2 units/ml in NS infusion 2,000 Units  1,000 mL Irrigation PRN  
 0.9% sodium chloride infusion 250 mL  250 mL IntraVENous PRN  
 0.9% sodium chloride infusion 250 mL  250 mL IntraVENous PRN  
 insulin lispro (HUMALOG) injection   SubCUTAneous Q6H  
 polyvinyl alcohol (LIQUIFILM TEARS) 1.4 % ophthalmic solution 1 Drop  1 Drop Both Eyes PRN  
 NUTRITIONAL SUPPORT ELECTROLYTE PRN ORDERS   Does Not Apply PRN  
 ondansetron (ZOFRAN) injection 4 mg  4 mg IntraVENous Q6H PRN  
 bisacodyl (DULCOLAX) tablet 5 mg  5 mg Oral DAILY PRN  
 fentaNYL citrate (PF) injection 50 mcg  50 mcg IntraVENous Q2H PRN Physical Exam: No spontaneous eye opening this a.m. ; tremors RUE briefly 
+ nystagmus when eyes opened manually, no resistance Minimal response to pain but withdraws right more so than left Not fcs CV rrr trace murmer Lungs coarse bs bilaterally 
abd soft ntnd bs+ Ext echym adela UEs, no rashes. Incision(s)/Wound(s): Wound Gluteal fold / cleft (Active) Dressing Status  Clean, dry, and intact 2/27/2019  8:01 PM  
Dressing Type  Foam;Silicone 9/88/2574  5:16 PM  
Tissue Type Pink 2/27/2019  4:00 PM  
Drainage Amount  None 2/27/2019  4:00 PM  
Wound Odor None 2/27/2019  4:00 PM  
Periwound Skin Condition Intact 2/27/2019  4:00 PM  
Number of days: 1842 Wound Arm Right (Active) Number of days: 5874 [REMOVED] Wound Arm Right;Upper (Removed) Number of days: 2478 Functional Assessment: 
Gross Assessment AROM: Grossly decreased, non-functional(held on to bedrails when placed there) (02/27/19 1136) PROM: Generally decreased, functional (02/27/19 1136) Strength: Grossly decreased, non-functional (02/27/19 1136) Coordination: Grossly decreased, non-functional (02/27/19 1136) Tone: Normal (02/27/19 1136) Sensation: Impaired(did not pull away from painful stimuli) (02/27/19 1136) Bed Mobility Rolling: Total assistance (02/27/19 1222) Supine to Sit: Total assistance (02/27/19 1222) Sit to Supine: Total assistance (02/27/19 1136) Scooting: Total assistance (02/27/19 1136) Balance Sitting: Impaired (02/27/19 1222) Sitting - Static: Poor (constant support) (02/27/19 1222) Sitting - Dynamic: Poor (constant support) (02/27/19 1222) Bed/Mat Mobility Rolling: Total assistance (02/27/19 1222) Supine to Sit: Total assistance (02/27/19 1222) Sit to Supine: Total assistance (02/27/19 1136) Scooting: Total assistance (02/27/19 1136) Labs/Studies: 
Recent Results (from the past 72 hour(s)) SODIUM Collection Time: 02/26/19 10:19 AM  
Result Value Ref Range Sodium 153 (H) 136 - 145 mmol/L  
POTASSIUM Collection Time: 02/26/19 10:19 AM  
Result Value Ref Range Potassium 3.8 3.5 - 5.1 mmol/L  
GLUCOSE, POC Collection Time: 02/26/19 11:40 AM  
Result Value Ref Range Glucose (POC) 182 (H) 65 - 100 mg/dL LYMPHOCYTES, T-CELL SUBSETS PLUS CD3 Collection Time: 02/26/19  1:02 PM  
Result Value Ref Range Abs CD3 892 622 - 2,402 /uL Abs CD4 [place] 350 (L) 359 - 1,519 /uL Abs CD 8 Suppressor 546 109 - 897 /uL  
 % CD 3 Pos Lymph 81.1 57.5 - 86.2 %  
 % CD 4 Pos Lymph 31.8 30.8 - 58.5 %  
 % CD 8 Pos Lymph 49.6 (H) 12.0 - 35.5 % CD4/CD8 Ratio 0.64 (L) 0.92 - 3.72 WBC 9.4 3.4 - 10.8 x10E3/uL Lymphocytes 11 Not Estab. % Abs Lymphocytes 1.1 0.7 - 3.1 x10E3/uL HCV AB W/RFLX TO DAVID Collection Time: 02/26/19  1:03 PM  
Result Value Ref Range HCV Ab 6.0 (H) 0.0 - 0.9 s/co ratio LYMPHOCYTES, CD4 PERCENT AND ABSOLUTE Collection Time: 02/26/19  1:03 PM  
Result Value Ref Range Abs CD4 [place] 407 359 - 1,519 /uL % CD 4 Pos Lymph 33.9 30.8 - 58.5 % WBC 11.3 (H) 3.4 - 10.8 x10E3/uL  
 RBC 2.61 (L) 3.77 - 5.28 x10E6/uL HGB 8.3 (L) 11.1 - 15.9 g/dL HCT 24.6 (L) 34.0 - 46.6 % MCV 94 79 - 97 fL  
 MCH 31.8 26.6 - 33.0 pg  
 MCHC 33.7 31.5 - 35.7 g/dL  
 RDW 15.8 (H) 12.3 - 15.4 % PLATELET 764 (L) 878 - 379 x10E3/uL NEUTROPHILS 83 Not Estab. % Lymphocytes 11 Not Estab. % MONOCYTES 5 Not Estab. % EOSINOPHILS 0 Not Estab. % BASOPHILS 0 Not Estab. %  
 ABS. NEUTROPHILS 9.4 (H) 1.4 - 7.0 x10E3/uL Abs Lymphocytes 1.2 0.7 - 3.1 x10E3/uL  
 ABS. MONOCYTES 0.6 0.1 - 0.9 x10E3/uL  
 ABS. EOSINOPHILS 0.0 0.0 - 0.4 x10E3/uL  
 ABS. BASOPHILS 0.0 0.0 - 0.2 x10E3/uL IMMATURE GRANULOCYTES 1 Not Estab. %  
 ABS. IMM. GRANS. 0.1 0.0 - 0.1 X10E3/uL HCV RNA DAVID QUALITATIVE Collection Time: 02/26/19  1:03 PM  
Result Value Ref Range HCV RNA DAVID, QL NEGATIVE  NEGATIVE INTERPRETATION Collection Time: 02/26/19  1:03 PM  
Result Value Ref Range Interpretation Comment GLUCOSE, POC Collection Time: 02/26/19  5:31 PM  
Result Value Ref Range Glucose (POC) 181 (H) 65 - 100 mg/dL SODIUM Collection Time: 02/26/19  6:03 PM  
Result Value Ref Range Sodium 155 (H) 136 - 145 mmol/L  
SODIUM Collection Time: 02/26/19 11:37 PM  
Result Value Ref Range Sodium 155 (H) 136 - 145 mmol/L  
GLUCOSE, POC Collection Time: 02/26/19 11:37 PM  
Result Value Ref Range Glucose (POC) 167 (H) 65 - 100 mg/dL POC G3 Collection Time: 02/27/19  3:40 AM  
Result Value Ref Range Device: VENT    
 FIO2 (POC) 40 % pH (POC) 7.411 7.35 - 7.45    
 pCO2 (POC) 30.1 (L) 35 - 45 MMHG  
 pO2 (POC) 146 (H) 75 - 100 MMHG  
 HCO3 (POC) 19.2 (L) 22 - 26 MMOL/L  
 sO2 (POC) 99 (H) 95 - 98 % Base deficit (POC) 5 mmol/L Mode Pressure regulated volume control Tidal volume 450 ml Set Rate 12 bpm  
 PEEP/CPAP (POC) 8 cmH2O Allens test (POC) NOT APPLICABLE Inspiratory Time 0.9 sec Site DRAWN FROM ARTERIAL LINE Patient temp. 98.6 Specimen type (POC) ARTERIAL Performed by Rikki   
 CO2, POC 20 MMOL/L Respiratory comment: NurseNotified Exhaled minute volume 11.30 L/min COLLECT TIME 335 GLUCOSE, POC Collection Time: 02/27/19  5:23 AM  
Result Value Ref Range Glucose (POC) 162 (H) 65 - 100 mg/dL CBC W/O DIFF Collection Time: 02/27/19  8:12 AM  
Result Value Ref Range WBC 13.4 (H) 4.3 - 11.1 K/uL  
 RBC 2.90 (L) 4.05 - 5.2 M/uL HGB 9.1 (L) 11.7 - 15.4 g/dL HCT 28.8 (L) 35.8 - 46.3 % MCV 99.3 (H) 79.6 - 97.8 FL  
 MCH 31.4 26.1 - 32.9 PG  
 MCHC 31.6 31.4 - 35.0 g/dL  
 RDW 15.9 (H) 11.9 - 14.6 % PLATELET 491 418 - 927 K/uL MPV 9.8 9.4 - 12.3 FL ABSOLUTE NRBC 0.00 0.0 - 0.2 K/uL METABOLIC PANEL, BASIC Collection Time: 02/27/19  8:12 AM  
Result Value Ref Range Sodium 155 (H) 136 - 145 mmol/L Potassium 3.9 3.5 - 5.1 mmol/L Chloride 127 (H) 98 - 107 mmol/L  
 CO2 20 (L) 21 - 32 mmol/L Anion gap 8 7 - 16 mmol/L Glucose 170 (H) 65 - 100 mg/dL BUN 45 (H) 6 - 23 MG/DL Creatinine 2.13 (H) 0.6 - 1.0 MG/DL  
 GFR est AA 31 (L) >60 ml/min/1.73m2 GFR est non-AA 25 (L) >60 ml/min/1.73m2 Calcium 7.9 (L) 8.3 - 10.4 MG/DL MAGNESIUM Collection Time: 02/27/19  8:12 AM  
Result Value Ref Range Magnesium 2.2 1.8 - 2.4 mg/dL SODIUM Collection Time: 02/27/19  8:13 AM  
Result Value Ref Range Sodium 155 (H) 136 - 145 mmol/L  
GLUCOSE, POC Collection Time: 02/27/19 12:09 PM  
Result Value Ref Range Glucose (POC) 188 (H) 65 - 100 mg/dL SODIUM Collection Time: 02/27/19  3:35 PM  
Result Value Ref Range Sodium 155 (H) 136 - 145 mmol/L  
GLUCOSE, POC Collection Time: 02/27/19  6:15 PM  
Result Value Ref Range Glucose (POC) 196 (H) 65 - 100 mg/dL GLUCOSE, POC Collection Time: 02/28/19 12:19 AM  
Result Value Ref Range Glucose (POC) 165 (H) 65 - 100 mg/dL MAGNESIUM  
 Collection Time: 02/28/19  3:29 AM  
Result Value Ref Range Magnesium 2.3 1.8 - 2.4 mg/dL CBC W/O DIFF Collection Time: 02/28/19  3:29 AM  
Result Value Ref Range WBC 16.5 (H) 4.3 - 11.1 K/uL  
 RBC 2.79 (L) 4.05 - 5.2 M/uL HGB 8.8 (L) 11.7 - 15.4 g/dL HCT 27.6 (L) 35.8 - 46.3 % MCV 98.9 (H) 79.6 - 97.8 FL  
 MCH 31.5 26.1 - 32.9 PG  
 MCHC 31.9 31.4 - 35.0 g/dL  
 RDW 15.7 (H) 11.9 - 14.6 % PLATELET 347 098 - 491 K/uL MPV 10.0 9.4 - 12.3 FL ABSOLUTE NRBC 0.00 0.0 - 0.2 K/uL METABOLIC PANEL, BASIC Collection Time: 02/28/19  3:29 AM  
Result Value Ref Range Sodium 155 (H) 136 - 145 mmol/L Potassium 3.9 3.5 - 5.1 mmol/L Chloride 127 (H) 98 - 107 mmol/L  
 CO2 21 21 - 32 mmol/L Anion gap 7 7 - 16 mmol/L Glucose 158 (H) 65 - 100 mg/dL BUN 50 (H) 6 - 23 MG/DL Creatinine 2.12 (H) 0.6 - 1.0 MG/DL  
 GFR est AA 31 (L) >60 ml/min/1.73m2 GFR est non-AA 25 (L) >60 ml/min/1.73m2 Calcium 8.1 (L) 8.3 - 10.4 MG/DL  
POC G3 Collection Time: 02/28/19  3:32 AM  
Result Value Ref Range Device: VENT    
 FIO2 (POC) 40 % pH (POC) 7.436 7.35 - 7.45    
 pCO2 (POC) 26.9 (L) 35 - 45 MMHG  
 pO2 (POC) 85 75 - 100 MMHG  
 HCO3 (POC) 18.1 (L) 22 - 26 MMOL/L  
 sO2 (POC) 97 95 - 98 % Base deficit (POC) 6 mmol/L Mode Pressure regulated volume control Tidal volume 450 ml Set Rate 12 bpm  
 PEEP/CPAP (POC) 8 cmH2O Allens test (POC) NOT APPLICABLE Inspiratory Time 0.9 sec Site DRAWN FROM ARTERIAL LINE Patient temp. 98.6 Specimen type (POC) ARTERIAL Performed by Yuki EAGLE, POC 19 MMOL/L Respiratory comment: NurseNotified Exhaled minute volume 13.10 L/min COLLECT TIME 327 GLUCOSE, POC Collection Time: 02/28/19  5:50 AM  
Result Value Ref Range Glucose (POC) 159 (H) 65 - 100 mg/dL GLUCOSE, POC Collection Time: 02/28/19 12:39 PM  
Result Value Ref Range Glucose (POC) 158 (H) 65 - 100 mg/dL GLUCOSE, POC Collection Time: 02/28/19  5:48 PM  
Result Value Ref Range Glucose (POC) 178 (H) 65 - 100 mg/dL GLUCOSE, POC Collection Time: 02/28/19 11:31 PM  
Result Value Ref Range Glucose (POC) 182 (H) 65 - 100 mg/dL MAGNESIUM Collection Time: 03/01/19  3:24 AM  
Result Value Ref Range Magnesium 2.4 1.8 - 2.4 mg/dL CBC W/O DIFF Collection Time: 03/01/19  3:24 AM  
Result Value Ref Range WBC 17.3 (H) 4.3 - 11.1 K/uL  
 RBC 2.78 (L) 4.05 - 5.2 M/uL HGB 8.7 (L) 11.7 - 15.4 g/dL HCT 27.1 (L) 35.8 - 46.3 % MCV 97.5 79.6 - 97.8 FL  
 MCH 31.3 26.1 - 32.9 PG  
 MCHC 32.1 31.4 - 35.0 g/dL  
 RDW 15.4 (H) 11.9 - 14.6 % PLATELET 510 016 - 310 K/uL MPV 9.9 9.4 - 12.3 FL ABSOLUTE NRBC 0.00 0.0 - 0.2 K/uL METABOLIC PANEL, BASIC Collection Time: 03/01/19  3:24 AM  
Result Value Ref Range Sodium 155 (H) 136 - 145 mmol/L Potassium 3.5 3.5 - 5.1 mmol/L Chloride 125 (H) 98 - 107 mmol/L  
 CO2 22 21 - 32 mmol/L Anion gap 8 7 - 16 mmol/L Glucose 168 (H) 65 - 100 mg/dL BUN 55 (H) 6 - 23 MG/DL Creatinine 2.29 (H) 0.6 - 1.0 MG/DL  
 GFR est AA 28 (L) >60 ml/min/1.73m2 GFR est non-AA 23 (L) >60 ml/min/1.73m2 Calcium 8.1 (L) 8.3 - 10.4 MG/DL  
POC G3 Collection Time: 03/01/19  3:30 AM  
Result Value Ref Range Device: VENT    
 FIO2 (POC) 50 % pH (POC) 7.469 (H) 7.35 - 7.45    
 pCO2 (POC) 28.6 (L) 35 - 45 MMHG  
 pO2 (POC) 201 (H) 75 - 100 MMHG  
 HCO3 (POC) 20.8 (L) 22 - 26 MMOL/L  
 sO2 (POC) 100 (H) 95 - 98 % Base deficit (POC) 3 mmol/L Mode Pressure regulated volume control Tidal volume 450 ml Set Rate 12 bpm  
 PEEP/CPAP (POC) 8 cmH2O Allens test (POC) NOT APPLICABLE Inspiratory Time 0.9 sec Site DRAWN FROM ARTERIAL LINE Patient temp. 98.6 Specimen type (POC) ARTERIAL Performed by Yuki   
 CO2, POC 22 MMOL/L Respiratory comment: NurseNotified Exhaled minute volume 10.50 L/min COLLECT TIME 327 GLUCOSE, POC Collection Time: 03/01/19  5:23 AM  
Result Value Ref Range Glucose (POC) 156 (H) 65 - 100 mg/dL Assessment:  
 
Principal Problem: 
  Nontraumatic subarachnoid hemorrhage from right middle cerebral artery ([address] (2/20/2019) Active Problems: 
  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  
 
Recommendations: Continue Acute Rehab Program 
Coordination of rehab/medical care Counseling of PM & R care issues management Monitoring and management of medical conditions per plan of care/orders Discussion with Family/Caregiver/Staff Reviewed Therapies/Labs/Medications/Records Signed By:  Alva Hurtado MD   
 March 1, 2019

## 2019-03-01 NOTE — PROGRESS NOTES
Pt temperature up to 100.3. Cooling blanket activated and PRN tylenol given. Follow up 0546: pt temp now 99.1

## 2019-03-01 NOTE — PROGRESS NOTES
Progress Note Patient: Ana Fernandez MRN: [id number]  SSN: [id number] YOB: 1960  Age: 60 y.o. Sex: female Admit Date: 2/20/2019 LOS: 9 days Subjective:  
Pt remains intubated, had focal seizure activity last pm, given ativan 2mg iv x1 and no further activity noted. Pt does not open eyes or follow commands this am. Wd pain all extrems. PERRL+4/B Current Facility-Administered Medications Medication Dose Route Frequency  dolutegravir (TIVICAY) tablet 50 mg  50 mg Per G Tube DAILY  darunavir ethanolate (PREZISTA) tablet 800 mg  800 mg Per G Tube 7am  
 chlorhexidine (PERIDEX) 0.12 % mouthwash 15 mL  15 mL Oral Q12H  
 ritonavir (NORVIR) oral powder 100 mg  100 mg Per NG tube DAILY WITH BREAKFAST  niMODipine (NIMOTOP) 30 mg/mL oral solution (compound) 30 mg  30 mg Per NG tube Q4H  
 levETIRAcetam (KEPPRA) 1,000 mg in 0.9% sodium chloride 100 mL IVPB  1,000 mg IntraVENous Q12H  
 lacosamide (VIMPAT) 200 mg in 0.9% sodium chloride 100 mL IVPB  200 mg IntraVENous Q12H  
 metoprolol tartrate (LOPRESSOR) tablet 50 mg  50 mg Per NG tube BID  aspirin chewable tablet 81 mg  81 mg Per NG tube DAILY  ibuprofen (MOTRIN) tablet 600 mg  600 mg Per NG tube Q4H PRN  
 acetaminophen (TYLENOL) tablet 650 mg  650 mg Per NG tube Q4H PRN  niCARdipine (CARDENE) 25 mg in 0.9% sodium chloride 250 mL infusion  0-15 mg/hr IntraVENous TITRATE  
 0.9% sodium chloride infusion  50 mL/hr IntraVENous CONTINUOUS  
 heparin (porcine) injection 5,000 Units  5,000 Units SubCUTAneous Q12H  
 lansoprazole (PREVACID SOLUTAB) disintegrating tablet 30 mg  30 mg Per NG tube ACB  magnesium oxide (MAG-OX) tablet 400 mg  400 mg Per NG tube BID  senna-docusate (PERICOLACE) 8.6-50 mg per tablet 2 Tab  2 Tab Per NG tube QHS  venlafaxine (EFFEXOR) tablet 50 mg  50 mg Per NG tube TID WITH MEALS  abacavir (ZIAGEN) 20 mg/mL oral solution 300 mg  300 mg Per G Tube Q12H  heparin (PF) 2 units/ml in NS infusion 2,000 Units  1,000 mL Irrigation PRN  
 0.9% sodium chloride infusion 250 mL  250 mL IntraVENous PRN  
 0.9% sodium chloride infusion 250 mL  250 mL IntraVENous PRN  
 insulin lispro (HUMALOG) injection   SubCUTAneous Q6H  
 polyvinyl alcohol (LIQUIFILM TEARS) 1.4 % ophthalmic solution 1 Drop  1 Drop Both Eyes PRN  
 NUTRITIONAL SUPPORT ELECTROLYTE PRN ORDERS   Does Not Apply PRN  
 ondansetron (ZOFRAN) injection 4 mg  4 mg IntraVENous Q6H PRN  
 bisacodyl (DULCOLAX) tablet 5 mg  5 mg Oral DAILY PRN  
 fentaNYL citrate (PF) injection 50 mcg  50 mcg IntraVENous Q2H PRN Objective:  
 
Vitals:  
 03/01/19 0920 03/01/19 0941 03/01/19 1001 03/01/19 1020 BP: 147/72 155/76 167/81 183/84 Pulse: 77 63 61 62 Resp: 20 25 24 23 Temp:      
SpO2: 100% 100% 100% 100% Weight:      
  
  
Intake and Output: 
Current Shift: No intake/output data recorded. Last 24 hr: 02/28 0701 - 03/01 0700 In: 3134.9 [I.V.:1677.9] Out: 3960 [VYIMD:2352] Physical Exam:  
General:  Intubated, no distress. Does not follow this am.  
Eyes:  PERRL+4/B Neck: Supple, symmetrical, trachea midline, no adenopathy, thyroid: no enlargment/tenderness/nodules, no carotid bruit and no JVD. Lungs:   Clear to auscultation bilaterally. Heart:  Regular rate and rhythm, S1, S2 normal, no murmur, click, rub or gallop. Abdomen:   Soft, non-tender. Bowel sounds normal. No masses,  No organomegaly. Extremities: Extremities normal, atraumatic, no cyanosis, generalized edema. Pulses: 2+ and symmetric all extremities. Skin: Skin color, texture, turgor normal, various bruises bue. Neurologic: Pt intubated, no sedation, did get ativan 2mg IV around 0130 this am, wd pain all extrems R >L. Lab/Data Review: 
 
Recent Results (from the past 12 hour(s)) GLUCOSE, POC Collection Time: 02/28/19 11:31 PM  
Result Value Ref Range Glucose (POC) 182 (H) 65 - 100 mg/dL MAGNESIUM  
 Collection Time: 03/01/19  3:24 AM  
Result Value Ref Range Magnesium 2.4 1.8 - 2.4 mg/dL CBC W/O DIFF Collection Time: 03/01/19  3:24 AM  
Result Value Ref Range WBC 17.3 (H) 4.3 - 11.1 K/uL  
 RBC 2.78 (L) 4.05 - 5.2 M/uL HGB 8.7 (L) 11.7 - 15.4 g/dL HCT 27.1 (L) 35.8 - 46.3 % MCV 97.5 79.6 - 97.8 FL  
 MCH 31.3 26.1 - 32.9 PG  
 MCHC 32.1 31.4 - 35.0 g/dL  
 RDW 15.4 (H) 11.9 - 14.6 % PLATELET 414 441 - 873 K/uL MPV 9.9 9.4 - 12.3 FL ABSOLUTE NRBC 0.00 0.0 - 0.2 K/uL METABOLIC PANEL, BASIC Collection Time: 03/01/19  3:24 AM  
Result Value Ref Range Sodium 155 (H) 136 - 145 mmol/L Potassium 3.5 3.5 - 5.1 mmol/L Chloride 125 (H) 98 - 107 mmol/L  
 CO2 22 21 - 32 mmol/L Anion gap 8 7 - 16 mmol/L Glucose 168 (H) 65 - 100 mg/dL BUN 55 (H) 6 - 23 MG/DL Creatinine 2.29 (H) 0.6 - 1.0 MG/DL  
 GFR est AA 28 (L) >60 ml/min/1.73m2 GFR est non-AA 23 (L) >60 ml/min/1.73m2 Calcium 8.1 (L) 8.3 - 10.4 MG/DL  
POC G3 Collection Time: 03/01/19  3:30 AM  
Result Value Ref Range Device: VENT    
 FIO2 (POC) 50 % pH (POC) 7.469 (H) 7.35 - 7.45    
 pCO2 (POC) 28.6 (L) 35 - 45 MMHG  
 pO2 (POC) 201 (H) 75 - 100 MMHG  
 HCO3 (POC) 20.8 (L) 22 - 26 MMOL/L  
 sO2 (POC) 100 (H) 95 - 98 % Base deficit (POC) 3 mmol/L Mode Pressure regulated volume control Tidal volume 450 ml Set Rate 12 bpm  
 PEEP/CPAP (POC) 8 cmH2O Allens test (POC) NOT APPLICABLE Inspiratory Time 0.9 sec Site DRAWN FROM ARTERIAL LINE Patient temp. 98.6 Specimen type (POC) ARTERIAL Performed by Yuki EAGLE, POC 22 MMOL/L Respiratory comment: NurseNotified Exhaled minute volume 10.50 L/min COLLECT TIME 327 GLUCOSE, POC Collection Time: 03/01/19  5:23 AM  
Result Value Ref Range Glucose (POC) 156 (H) 65 - 100 mg/dL Assessment/ Plan:  
 
Principal Problem: Nontraumatic subarachnoid hemorrhage from right middle cerebral artery ([address] (2019) Active Problems: 
  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 Admitted to ICU. Q1 hour neuro checks. On SAH protocol - nimotop, Mg, lipitor. Hoang Mcdaniel 4, [place] Grade 4. CT head shows stable right frontal ICH with 7mm midline shift and IVH. CTA shows no filling of aneurysm and no obvious vasospasm.  CTP was normal.  MRI brain showed only small HITS mainly in the right ICA territory and nothing that would explain her being drowsy or weak.  ASA response: 350, started on 81mg po daily.  EEG obtained with final read abnormal, enhanced cortical irritability. Increased Keppra to 1000mg BID and added Vimpat 200mg BID. Last CT showed improved midline shift. Na 155. Patient waxing and waning. Exam not great today. I spoke with the family about either trach/PEG or taking her off the ventilator and making her comfort care. Patient is DNR. Family has decided to withdraw care. Will make comfort care in am per family. Repeat HCT this am with no acute changes. RESP: Acute respiratory failure with hypoxia. Pt intubated with 7.5 OETT, 22 at teeth, intact with breath sound bilat. No distress. Pt on PRVC @ 50%, P8, R15: AB.4201. FIO2s decreased to 40%. No secretions. CV: SBP goal <180.  cardene gtt prn.  On metoprolol as well. 2D Echo: EF 40-45%, stress induced cardiomyopathy consistent with Takosubo. Trop peaked at 6.4, has trended back down, last one 1.4.+ murmur noted on exam. Will repeat echo next week.  HEME: HH 8.7/27 .  SCDs/heparin 5000 units to bid.  
NEPH: bun/cr:55/2.2 (hx of CKD). GI: TF today as tolerated. Pepcid. CK, triglycerides and LFTs are okay.   
ID: TM: 100. 3. No abx at this time. Cont to monitor. Cooling blanket. LINES: Left IJ quad,  Mi.  
HIV: On HIV meds. CD4 count was low, 350. Family has made pt a DNR and have decided to make pt comfort care. Family will be driving in from [place] to see her first and then will change to comfort care measures. 
  
 
 
 
Signed By: Nicky Marcus NP March 1, 2019

## 2019-03-01 NOTE — PROGRESS NOTES
Bedside report received from [facility]. Dual neuro assessment performed.

## 2019-03-01 NOTE — PROGRESS NOTES
OT Note:  Consulted with RN, and RN placed patient on hold for therapy this date due to decreased Neurological Status. Will attempt again as time permits.  
Rachid Barajas, OT

## 2019-03-01 NOTE — PROGRESS NOTES
Spoke to Elliot Shelton NP regarding pt being made comfort tomorrow and frequency of neuro assessments. Was ordered to d/c neuro checks.

## 2019-03-02 NOTE — PROGRESS NOTES
03/02/19 1415 Dual Skin Pressure Injury Assessment Dual Skin Pressure Injury Assessment X Second Care Provider (Based on [address] [place] [address]) Kendrick Villarreal RN Sacrum  Mid  
Skin Integumentary Skin Integumentary (WDL) X Pressure  Injury Documentation Pressure Injury Noted-See Wound LDA to Document Skin Condition/Temp Cool Skin Color Ecchymosis (comment); Other (comment) Skin Integrity Blister;Abrasion Turgor Shiney/hard Wound Prevention and Protection Methods Orientation of Wound Prevention Posterior Location of Wound Prevention Sacrum/Coccyx; Heel Wound Offloading (Prevention Methods) Bed, pressure reduction mattress Generalized edema, abrasion at right abdomen

## 2019-03-02 NOTE — PROGRESS NOTES
Dr. Alejandro Rivera made aware by phone that family is at the bedside and ready to proceed with comfort care measures. Orders received.

## 2019-03-02 NOTE — PROGRESS NOTES
Spoke to pts sister Kylah Llanes) from [facility]. Gave permission to compassionately extubate and begin comfort care measures when other family arrives. Called [place] - ordered to hold 0900 meds. Dr. Priya Camargo arriving soon.

## 2019-03-02 NOTE — PROGRESS NOTES
Ventilator check complete; patient has a #7.5 ET tube secured at the 24 at the lip. Patient is not sedated. Patient is able to follow commands. Breath sounds are coarse. Trachea is midline, Negative for subcutaneous air, and chest excursion is symmetric. Patient is also Negative for cyanosis. All alarms are set and audible. Resuscitation bag is at the head of the bed. Ventilator Settings Mode FIO2 Rate Tidal Volume Pressure PEEP I:E Ratio PRVC  40 %   12 450 ml     8 cm H20  1:4.5 Peak airway pressure: 27 cm H2O Minute ventilation: 10.5 l/min ABG: No results for input(s): PH, PCO2, PO2, HCO3 in the last 72 hours.  
 
 
Maribell Marie, RT

## 2019-03-02 NOTE — PROGRESS NOTES
Per Ede Soriano, ICU RN, in case of death, call Dr Caryle Bellow, for any other questions call Danielle Villafana, BRANDON 24/7.

## 2019-03-02 NOTE — PROGRESS NOTES
Ventilator check complete; patient has a #7.5 ET tube secured at the 24 at the lip. Patient is not able to follow commands. Breath sounds are coarse. Trachea is midline, Negative for subcutaneous air, and chest excursion is symmetric. Patient is also Negative for cyanosis and is Negative for pitting edema. All alarms are set and audible. Resuscitation bag is at the head of the bed. Ventilator Settings Mode FIO2 Rate Tidal Volume Pressure PEEP I:E Ratio PRVC  40 %   12 450 ml     8 cm H20  1:4.5 Peak airway pressure: 14 cm H2O Minute ventilation: 10.9 l/min ABG: No results for input(s): PH, PCO2, PO2, HCO3 in the last 72 hours.  
 
 
Medardo Bolanos, RT

## 2019-03-02 NOTE — PROGRESS NOTES
Progress Note Patient: Camron Morrison MRN: [id number]  SSN: [id number] YOB: 1960  Age: 60 y.o. Sex: female Admit Date: 2/20/2019 LOS: 10 days Subjective:  
Patient made comfort care this am and family at bedside. Current Facility-Administered Medications Medication Dose Route Frequency  LORazepam (ATIVAN) injection 1 mg  1 mg IntraVENous Q15MIN PRN  
 ondansetron (ZOFRAN) injection 4 mg  4 mg IntraVENous Q4H PRN  
 scopolamine (TRANSDERM-SCOP) 1 mg over 3 days 1 Patch  1 Patch TransDERmal Q72H  morphine injection 2 mg  2 mg IntraVENous Q15MIN PRN  
 ibuprofen (MOTRIN) tablet 600 mg  600 mg Per NG tube Q4H PRN  
 acetaminophen (TYLENOL) tablet 650 mg  650 mg Per NG tube Q4H PRN  polyvinyl alcohol (LIQUIFILM TEARS) 1.4 % ophthalmic solution 1 Drop  1 Drop Both Eyes PRN  
 bisacodyl (DULCOLAX) tablet 5 mg  5 mg Oral DAILY PRN Objective:  
 
Vitals:  
 03/02/19 1145 03/02/19 1201 03/02/19 1216 03/02/19 1230 BP: 157/71 155/74 157/77 163/80 Pulse: 71 71 72 72 Resp: 22 24 23 20 Temp:      
SpO2: 100% 100% 100% 100% Weight:      
  
  
Intake and Output: 
Current Shift: 03/02 0701 - 03/02 1900 In: 27 Out: 250 [Urine:250] Last 24 hr: 03/01 0701 - 03/02 0700 In: 8920 [I.V.:1777] Out: 2625 [Urine:2625] Physical Exam:  
General:  obtunded Eyes:  Pupils sluggish Neck: Supple, symmetrical, trachea midline, no adenopathy, thyroid: no enlargment/tenderness/nodules, no carotid bruit and no JVD. Lungs:   Rhonchi bilaterally Heart:  + murmer Abdomen:   Soft, non-tender. Bowel sounds normal. No masses,  No organomegaly. Extremities: Extremities normal, atraumatic, no cyanosis or edema. Pulses: 2+ and symmetric all extremities. Skin: Skin color, texture, turgor normal. No rashes or lesions Neurologic:  Patient obtunded and not responsive. Lab/Data Review: 
 
Recent Results (from the past 12 hour(s)) POC G3  
 Collection Time: 03/02/19  3:29 AM  
Result Value Ref Range Device: VENT    
 FIO2 (POC) 40 % pH (POC) 7.475 (H) 7.35 - 7.45    
 pCO2 (POC) 26.8 (L) 35 - 45 MMHG  
 pO2 (POC) 113 (H) 75 - 100 MMHG  
 HCO3 (POC) 19.7 (L) 22 - 26 MMOL/L  
 sO2 (POC) 99 (H) 95 - 98 % Base deficit (POC) 3 mmol/L Mode Pressure regulated volume control Tidal volume 450 ml Set Rate 12 bpm  
 PEEP/CPAP (POC) 8 cmH2O Allens test (POC) NOT APPLICABLE Inspiratory Time 0.9 sec Site DRAWN FROM ARTERIAL LINE Patient temp. 98.6 Specimen type (POC) ARTERIAL Performed by Yuki   
 CO2, POC 21 MMOL/L Respiratory comment: NurseNotified Exhaled minute volume 10.80 L/min COLLECT TIME 323 MAGNESIUM Collection Time: 03/02/19  4:11 AM  
Result Value Ref Range Magnesium 2.2 1.8 - 2.4 mg/dL CBC W/O DIFF Collection Time: 03/02/19  4:11 AM  
Result Value Ref Range WBC 16.5 (H) 4.3 - 11.1 K/uL  
 RBC 2.35 (L) 4.05 - 5.2 M/uL HGB 7.6 (L) 11.7 - 15.4 g/dL HCT 23.4 (L) 35.8 - 46.3 % MCV 99.6 (H) 79.6 - 97.8 FL  
 MCH 32.3 26.1 - 32.9 PG  
 MCHC 32.5 31.4 - 35.0 g/dL  
 RDW 15.3 (H) 11.9 - 14.6 % PLATELET 144 772 - 183 K/uL MPV 10.2 9.4 - 12.3 FL ABSOLUTE NRBC 0.00 0.0 - 0.2 K/uL METABOLIC PANEL, BASIC Collection Time: 03/02/19  4:11 AM  
Result Value Ref Range Sodium 155 (H) 136 - 145 mmol/L Potassium 3.0 (L) 3.5 - 5.1 mmol/L Chloride 127 (H) 98 - 107 mmol/L  
 CO2 19 (L) 21 - 32 mmol/L Anion gap 9 7 - 16 mmol/L Glucose 170 (H) 65 - 100 mg/dL BUN 47 (H) 6 - 23 MG/DL Creatinine 1.94 (H) 0.6 - 1.0 MG/DL  
 GFR est AA 34 (L) >60 ml/min/1.73m2 GFR est non-AA 28 (L) >60 ml/min/1.73m2 Calcium 6.9 (L) 8.3 - 10.4 MG/DL  
GLUCOSE, POC Collection Time: 03/02/19  5:50 AM  
Result Value Ref Range Glucose (POC) 186 (H) 65 - 100 mg/dL Assessment/ Plan:  
 
Principal Problem: Nontraumatic subarachnoid hemorrhage from right middle cerebral artery ([address] (2/20/2019) Active Problems: 
  Acute cystitis without hematuria (2/21/2019) Pneumonia of left lung due to infectious organism (2/21/2019) Nontraumatic subcortical hemorrhage of right cerebral hemisphere ([address] (2/21/2019) Communicating hydrocephalus (2/21/2019) Elevated troponin (2/21/2019) Cerebral edema ([address] (2/21/2019) Midline shift of brain (2/23/2019) [place] coma scale score 3-8, at arrival to emergency department [facility]) (2/25/2019) Seizure after head injury ([address] (2/26/2019) Patient made comfort care and will be transferred to the floor. Family at bedside and updated. Signed By: Millicent Gregory MD   
 March 2, 2019

## 2019-03-02 NOTE — PROGRESS NOTES
Head to toe assessment completed on pt this AM. Pt eyes open to voice with labile command following - able to move BUEs and squeeze hands/ wiggle toes. PERRLA - 5 mm bilaterally and sluggish to react. Strong cough/gag with suction. Nods appropriately. RASS -2. ETT with PRVC 40%. Lung sounds diminished upon auscultation. S1 ans S2 auscultated - NSR. NGT patent and infusing - verified with air bolus. Active bowel sounds. Mi draining and patent. LUE radial pulse 2+, RUE radial pulse 1+. ART line clean, dry and intact. BLEs cool - pedal pulses 1+.

## 2019-03-02 NOTE — PROGRESS NOTES
Occupational Therapy Note: Family has decided comfort care only. Will DC OT services at this time. Goals not met. Thank you.   Kee Hahn MS, OTR/L

## 2019-03-02 NOTE — PROGRESS NOTES
Bedside report given to [place], RN. Dual neuro assessment performed. Uneventful day with pt. Pt continues with no command following - withdraws from pain on all 4 extremities with grimace. Localizes to pain RUE. Eyes do not open to any stimulus. Strong cough with suction. Plan for comfort care tomorrow when family arrives in AM. No further neuro assessments per Jessenia Marie

## 2019-03-02 NOTE — PROGRESS NOTES
Problem: Falls - Risk of 
Goal: *Absence of Falls Document Maycol Macleod Fall Risk and appropriate interventions in the flowsheet. Outcome: Progressing Towards Goal 
Fall Risk Interventions: 
Mobility Interventions: Bed/chair exit alarm Mentation Interventions: Bed/chair exit alarm Medication Interventions: Bed/chair exit alarm Elimination Interventions: Call light in reach History of Falls Interventions: Bed/chair exit alarm, Door open when patient unattended, Investigate reason for fall Problem: Pressure Injury - Risk of 
Goal: *Prevention of pressure injury Document Sky Scale and appropriate interventions in the flowsheet. Outcome: Progressing Towards Goal 
Pressure Injury Interventions: 
Sensory Interventions: Assess changes in LOC Moisture Interventions: Absorbent underpads, Offer toileting Q_hr Activity Interventions: Pressure redistribution bed/mattress(bed type) Mobility Interventions: Pressure redistribution bed/mattress (bed type), Turn and reposition approx. every two hours(pillow and wedges) Nutrition Interventions: Document food/fluid/supplement intake Friction and Shear Interventions: Foam dressings/transparent film/skin sealants, Feet elevated on foot rest, Apply protective barrier, creams and emollients

## 2019-03-02 NOTE — PROGRESS NOTES
TRANSFER - IN REPORT: 
 
Verbal report received from [facility]  on Beto Huntley  being received from ICU (unit) for routine progression of care Report consisted of patients Situation, Background, Assessment and  
Recommendations(SBAR). Information from the following report(s) SBAR, OR Summary and Procedure Summary was reviewed with the receiving nurse. Opportunity for questions and clarification was provided. Assessment completed upon patients arrival to unit and care assumed.

## 2019-03-02 NOTE — PROGRESS NOTES
TRANSFER - OUT REPORT: 
 
Verbal report given to Sunshine Myles RN (name) on Wm Robledo  being transferred to [id number] for routine progression of care Report consisted of patients Situation, Background, Assessment and  
Recommendations(SBAR). Information from the following report(s) SBAR, Kardex, ED Summary, Procedure Summary, Intake/Output, MAR, Recent Results and Quality Measures was reviewed with the receiving nurse. Opportunity for questions and clarification was provided. Patient transported with: 
 [employer]

## 2019-03-03 PROBLEM — Z51.5 END OF LIFE CARE: Status: ACTIVE | Noted: 2019-01-01

## 2019-03-03 NOTE — PROGRESS NOTES
Patient continues cheyne-werner breathing. Per family, patient has \"stopped breathing a couple times in the past 30 or so minutes\". Will continue to monitor patient.

## 2019-03-03 NOTE — PROGRESS NOTES
Spent an extended period with patient at bedside She was not responsive to my voice Played several gospel tunes for relaxation Prayer offered There was no family present Sy Fuentes, staff [email or web address], [id number] [facility]  /   [email or web address]

## 2019-03-03 NOTE — PROGRESS NOTES
Patient progressively worsened throughout night. Patient continues to cheyne-werner breathing. Per family request, no vital signs, lab draws or suction. Patient on comfort measures. Will continue to monitor patient.

## 2019-03-03 NOTE — PROGRESS NOTES
Problem: Falls - Risk of 
Goal: *Absence of Falls Document [facility] Fall Risk and appropriate interventions in the flowsheet. Fall Risk Interventions: 
Mobility Interventions: Bed/chair exit alarm Mentation Interventions: Adequate sleep, hydration, pain control Medication Interventions: Evaluate medications/consider consulting pharmacy Elimination Interventions: Call light in reach History of Falls Interventions: Bed/chair exit alarm Problem: Pressure Injury - Risk of 
Goal: *Prevention of pressure injury Document Sky Scale and appropriate interventions in the flowsheet. Outcome: Progressing Towards Goal 
Pressure Injury Interventions: 
Sensory Interventions: Assess changes in LOC Moisture Interventions: Absorbent underpads Activity Interventions: Increase time out of bed, Pressure redistribution bed/mattress(bed type), PT/OT evaluation Mobility Interventions: HOB 30 degrees or less, Pressure redistribution bed/mattress (bed type), PT/OT evaluation Nutrition Interventions: Document food/fluid/supplement intake Friction and Shear Interventions: HOB 30 degrees or less

## 2019-03-03 NOTE — PROGRESS NOTES
Progress Note Patient: Sonia Poon MRN: [id number]  SSN: [id number] YOB: 1960  Age: 60 y.o. Sex: female Admit Date: 2/20/2019 LOS: 11 days Subjective:  
 
 
Current Facility-Administered Medications Medication Dose Route Frequency  morphine injection 4 mg  4 mg IntraVENous Q15MIN PRN  
 atropine 1 % ophthalmic solution 3 Drop  3 Drop SubLINGual TID PRN  
 LORazepam (ATIVAN) injection 1 mg  1 mg IntraVENous Q15MIN PRN  
 ondansetron (ZOFRAN) injection 4 mg  4 mg IntraVENous Q4H PRN  
 scopolamine (TRANSDERM-SCOP) 1 mg over 3 days 1 Patch  1 Patch TransDERmal Q72H  polyvinyl alcohol (LIQUIFILM TEARS) 1.4 % ophthalmic solution 1 Drop  1 Drop Both Eyes PRN Objective:  
 
Vitals:  
 03/02/19 1201 03/02/19 1216 03/02/19 1230 03/02/19 1429 BP: 155/74 157/77 163/80 178/66 Pulse: 71 72 72 75 Resp: 24 23 20 (!) 36 Temp:    99.2 °F (37.3 °C) SpO2: 100% 100% 100% 95% Weight:      
  
  
Intake and Output: 
Current Shift: No intake/output data recorded. Last 24 hr: 03/02 0701 - 03/03 0700 In: 27 Out: 1560 [[id number]] Physical Exam:  
General:  Alert, cooperative, no distress, appears stated age. Eyes:  Conjunctivae/corneas clear. PERRL, EOMs intact. Fundi benign Neck: Supple, symmetrical, trachea midline, no adenopathy, thyroid: no enlargment/tenderness/nodules, no carotid bruit and no JVD. Lungs:   Clear to auscultation bilaterally. Heart:  Regular rate and rhythm, S1, S2 normal, no murmur, click, rub or gallop. Abdomen:   Soft, non-tender. Bowel sounds normal. No masses,  No organomegaly. Extremities: Extremities normal, atraumatic, no cyanosis or edema. Pulses: 2+ and symmetric all extremities. Skin: Skin color, texture, turgor normal. No rashes or lesions Neurologic: CNII-XII intact. Normal strength, sensation and reflexes throughout. Lab/Data Review: No results found for this or any previous visit (from the past 12 hour(s)). Assessment/ Plan:  
 
Principal Problem: 
  Nontraumatic subarachnoid hemorrhage from right middle cerebral artery ([address] (2/20/2019) Active Problems: 
  Acute cystitis without hematuria (2/21/2019) Pneumonia of left lung due to infectious organism (2/21/2019) Nontraumatic subcortical hemorrhage of right cerebral hemisphere ([address] (2/21/2019) Communicating hydrocephalus (2/21/2019) Elevated troponin (2/21/2019) Cerebral edema ([address] (2/21/2019) Midline shift of brain (2/23/2019) [place] coma scale score 3-8, at arrival to emergency department [facility]) (2/25/2019) Seizure after head injury ([address] (2/26/2019) End of life care (3/3/2019) 1. Comfort care measures 2. Increased morphine dose this am for comfort, labored RR 
3. Atropine SL gtts for secretions added. 4. Call any concerns. Signed By: Jess Garcia NP March 3, 2019

## 2019-03-03 NOTE — PROGRESS NOTES
Problem: Falls - Risk of 
Goal: *Absence of Falls Document Cy Solomon Fall Risk and appropriate interventions in the flowsheet. Outcome: Progressing Towards Goal 
Fall Risk Interventions: 
Mobility Interventions: Bed/chair exit alarm Mentation Interventions: Bed/chair exit alarm Medication Interventions: Bed/chair exit alarm Elimination Interventions: Toileting schedule/hourly rounds, Bed/chair exit alarm History of Falls Interventions: Investigate reason for fall, Door open when patient unattended, Bed/chair exit alarm Problem: Pressure Injury - Risk of 
Goal: *Prevention of pressure injury Document Sky Scale and appropriate interventions in the flowsheet. Outcome: Not Progressing Towards Goal 
Pressure Injury Interventions: 
Sensory Interventions: Assess changes in LOC Moisture Interventions: Absorbent underpads Activity Interventions: Increase time out of bed, Pressure redistribution bed/mattress(bed type), PT/OT evaluation Mobility Interventions: HOB 30 degrees or less, Pressure redistribution bed/mattress (bed type), PT/OT evaluation Nutrition Interventions: Document food/fluid/supplement intake Friction and Shear Interventions: HOB 30 degrees or less

## 2019-03-04 NOTE — PROGRESS NOTES
Callback to care for family following death of pt. Ministry of presence & prayer to demonstrate caring & concern, convey emotional & spiritual support.  
 
 
gila Melendez, MDiv,ThM,PhD

## 2019-03-04 NOTE — PROGRESS NOTES
Patient noted cheyne-werner breathing, labored. PRN Morphine 4mg IV administered. Will continue to monitor patient.

## 2019-03-04 NOTE — PROGRESS NOTES
2224: Patient noted pulseless, no respirations. 2226: Elly Schwartz, BRANDON and house supervisor notified. 2227: After security code obtained, patient family notified of change of status.

## 2019-03-04 NOTE — PROGRESS NOTES
Called to evaluate Ms. Denise Badillo states no RR, no pulses as of 2224. Examined pt and there are no audible heart sounds, no audible heart sounds and no palpable central pulses are noted on exam. Pupils are fixed and non-reactive. On response to stimuli. The pt is a DNR and comfort care only status. No CPR or life saving measures were warranted or desired by family. TOD: 2227

## 2019-03-07 NOTE — DISCHARGE SUMMARY
[address] [place] [address] [address]     Name:  Cathy Ortiz  MR#:  [id number]  :  1960  ACCOUNT #:  [de-identified]  ADMIT DATE:  2019  DISCHARGE DATE:  2019    DEATH SUMMARY    HISTORY OF PRESENT ILLNESS:  The patient is a 54-year-old female, who presented with subarachnoid hemorrhage as well as intraventricular hemorrhage and right intracranial hemorrhage from a large right middle cerebral artery aneurysm. She underwent coiling on 2019. She slowly deteriorated and did not want to continue to be responsive and follow commands in spite of all of her therapy. MRI scan was performed that showed she had very minimal changes on her MRI scan that would explain this. CT angiogram showed no filling of her aneurysm and no significant vasospasm. CT perfusion looked good. It was then suspected that she might have seizures. An EEG was obtained and there was cortical irritability, but no obvious seizures. However, she was placed on Keppra 1000 b.i.d. and Vimpat 200 b.i.d. She did slowly start to respond again and follow some commands. However, with her history of HIV and the fact that her prognosis was going to be poor regardless of whether she survived or not, her family decided to make her comfort care because she had made her wishes very clear. She was made comfort care and then she was transferred to the floor. She was then pronounced dead on 2019 at 2224.         MD MARIXA Goode/[id number]  D:  2019 10:08  T:  2019 7:08  JOB #:  [id number]
